# Patient Record
Sex: MALE | Race: WHITE | NOT HISPANIC OR LATINO | Employment: FULL TIME | URBAN - METROPOLITAN AREA
[De-identification: names, ages, dates, MRNs, and addresses within clinical notes are randomized per-mention and may not be internally consistent; named-entity substitution may affect disease eponyms.]

---

## 2018-07-04 ENCOUNTER — OFFICE VISIT (OUTPATIENT)
Dept: URGENT CARE | Facility: CLINIC | Age: 33
End: 2018-07-04
Payer: COMMERCIAL

## 2018-07-04 VITALS
SYSTOLIC BLOOD PRESSURE: 112 MMHG | WEIGHT: 165 LBS | BODY MASS INDEX: 23.62 KG/M2 | TEMPERATURE: 98 F | RESPIRATION RATE: 16 BRPM | DIASTOLIC BLOOD PRESSURE: 73 MMHG | HEIGHT: 70 IN | OXYGEN SATURATION: 98 % | HEART RATE: 87 BPM

## 2018-07-04 DIAGNOSIS — J06.9 URI WITH COUGH AND CONGESTION: Primary | ICD-10-CM

## 2018-07-04 PROCEDURE — 3008F BODY MASS INDEX DOCD: CPT | Mod: CPTII,S$GLB,, | Performed by: NURSE PRACTITIONER

## 2018-07-04 PROCEDURE — 99203 OFFICE O/P NEW LOW 30 MIN: CPT | Mod: 25,S$GLB,, | Performed by: NURSE PRACTITIONER

## 2018-07-04 PROCEDURE — 96372 THER/PROPH/DIAG INJ SC/IM: CPT | Mod: S$GLB,,, | Performed by: NURSE PRACTITIONER

## 2018-07-04 RX ORDER — AMOXICILLIN AND CLAVULANATE POTASSIUM 875; 125 MG/1; MG/1
1 TABLET, FILM COATED ORAL EVERY 12 HOURS
Qty: 20 TABLET | Refills: 0 | Status: SHIPPED | OUTPATIENT
Start: 2018-07-04 | End: 2018-07-14

## 2018-07-04 RX ORDER — BETAMETHASONE SODIUM PHOSPHATE AND BETAMETHASONE ACETATE 3; 3 MG/ML; MG/ML
9 INJECTION, SUSPENSION INTRA-ARTICULAR; INTRALESIONAL; INTRAMUSCULAR; SOFT TISSUE
Status: COMPLETED | OUTPATIENT
Start: 2018-07-04 | End: 2018-07-04

## 2018-07-04 RX ADMIN — BETAMETHASONE SODIUM PHOSPHATE AND BETAMETHASONE ACETATE 9 MG: 3; 3 INJECTION, SUSPENSION INTRA-ARTICULAR; INTRALESIONAL; INTRAMUSCULAR; SOFT TISSUE at 09:07

## 2018-07-04 NOTE — PATIENT INSTRUCTIONS
USE FLONASE NASAL SPRAY AND MUCINEX OTC     WAIT 2-3 DAYS BEFORE FILLING ANTIBIOTIC    You can try breathe right strips at night to help you breathe.  A cool mist humidifier in bedroom may help with cough and relieve stuffy nose.     Sore throat:  Lozenge, hard candy or honey.      Sinus rinses DO NOT USE TAP WATER, if you must, water must be a rolling boil for 1 minute, let it cool, then use.  May use distilled water, or over the counter nasal saline rinses.  Vics vapor rub in shower to help open nasal passages.  May use nasal gel to keep passages moisturized.  May use Nasal saline sprays during the day for added relief of congestion.   For those who go to the gym, please do not use the sauna or steam room now to clear sinuses.    During pollen season, change shirt if you are outside for a while when you go in.  Also wash your face.  Do not touch your face with your hands.  Wash your hands often in general while ill, avoid face contact with hands.     Over the counter you can use Tylenol (acetominophen) or Ibuprofen for your minor aches and pains as long as you have no contraindications.    Good nutrition. Lots of rest. Plenty of fluids      Viral Upper Respiratory Illness (Adult)  You have a viral upper respiratory illness (URI), which is another term for the common cold. This illness is contagious during the first few days. It is spread through the air by coughing and sneezing. It may also be spread by direct contact (touching the sick person and then touching your own eyes, nose, or mouth). Frequent handwashing will decrease risk of spread. Most viral illnesses go away within 7 to 10 days with rest and simple home remedies. Sometimes the illness may last for several weeks. Antibiotics will not kill a virus, and they are generally not prescribed for this condition.    Home care  · If symptoms are severe, rest at home for the first 2 to 3 days. When you resume activity, don't let yourself get too  tired.  · Avoid being exposed to cigarette smoke (yours or others).  · You may use acetaminophen or ibuprofen to control pain and fever, unless another medicine was prescribed. (Note: If you have chronic liver or kidney disease, have ever had a stomach ulcer or gastrointestinal bleeding, or are taking blood-thinning medicines, talk with your healthcare provider before using these medicines.) Aspirin should never be given to anyone under 18 years of age who is ill with a viral infection or fever. It may cause severe liver or brain damage.  · Your appetite may be poor, so a light diet is fine. Avoid dehydration by drinking 6 to 8 glasses of fluids per day (water, soft drinks, juices, tea, or soup). Extra fluids will help loosen secretions in the nose and lungs.  · Over-the-counter cold medicines will not shorten the length of time youre sick, but they may be helpful for the following symptoms: cough, sore throat, and nasal and sinus congestion. (Note: Do not use decongestants if you have high blood pressure.)  Follow-up care  Follow up with your healthcare provider, or as advised.  When to seek medical advice  Call your healthcare provider right away if any of these occur:  · Cough with lots of colored sputum (mucus)  · Severe headache; face, neck, or ear pain  · Difficulty swallowing due to throat pain  · Fever of 100.4°F (38°C)  Call 911, or get immediate medical care  Call emergency services right away if any of these occur:  · Chest pain, shortness of breath, wheezing, or difficulty breathing  · Coughing up blood  · Inability to swallow due to throat pain  Date Last Reviewed: 9/13/2015 © 2000-2017 [employer]. [address] [place] [address], [place] [address]. All rights reserved. This information is not intended as a substitute for professional medical care. Always follow your healthcare professional's instructions.

## 2018-07-04 NOTE — PROGRESS NOTES
"Subjective:       Patient ID: Garrick White is a 33 y.o. male.    Vitals:  height is 5' 10" (1.778 m) and weight is 74.8 kg (165 lb). His temperature is 97.9 °F (36.6 °C). His blood pressure is 112/73 and his pulse is 87. His respiration is 16 and oxygen saturation is 98%.     Chief Complaint: URI (pt has been sick for 2 days)    Pt is here for cough, congestion, and headache for the last 2 days that has not improved with tylenol and decongestant. Pt said that he felt like he had fever this morning.       URI    This is a new problem. The current episode started in the past 7 days. The problem has been unchanged. There has been no fever. Associated symptoms include congestion and coughing. Pertinent negatives include no abdominal pain, chest pain, ear pain, headaches, nausea, sore throat or wheezing. He has tried acetaminophen and decongestant for the symptoms. The treatment provided mild relief.     Review of Systems   Constitution: Negative for chills, fever and malaise/fatigue.   HENT: Positive for congestion. Negative for ear pain, hoarse voice and sore throat.    Eyes: Negative for discharge and redness.   Cardiovascular: Negative for chest pain, dyspnea on exertion and leg swelling.   Respiratory: Positive for cough and sputum production. Negative for shortness of breath and wheezing.    Musculoskeletal: Negative for myalgias.   Gastrointestinal: Negative for abdominal pain and nausea.   Neurological: Negative for headaches.       Objective:      Physical Exam   Constitutional: He is oriented to person, place, and time. Vital signs are normal. He appears well-developed and well-nourished. He is cooperative.  Non-toxic appearance. He does not appear ill. No distress.   HENT:   Head: Normocephalic and atraumatic.   Right Ear: Hearing, tympanic membrane, external ear and ear canal normal.   Left Ear: Hearing, tympanic membrane, external ear and ear canal normal.   Nose: Mucosal edema and rhinorrhea present. No " nasal deformity. No epistaxis. Right sinus exhibits no maxillary sinus tenderness and no frontal sinus tenderness. Left sinus exhibits no maxillary sinus tenderness and no frontal sinus tenderness.   Mouth/Throat: Uvula is midline, oropharynx is clear and moist and mucous membranes are normal. No trismus in the jaw. Normal dentition. No uvula swelling. No oropharyngeal exudate, posterior oropharyngeal edema or posterior oropharyngeal erythema.   Eyes: Conjunctivae and lids are normal. No scleral icterus.   Sclera clear bilat   Neck: Trachea normal, full passive range of motion without pain and phonation normal. Neck supple.   Cardiovascular: Normal rate, regular rhythm, normal heart sounds, intact distal pulses and normal pulses.    Pulmonary/Chest: Effort normal and breath sounds normal. No respiratory distress. He has no decreased breath sounds. He has no wheezes.   Abdominal: Soft. Normal appearance and bowel sounds are normal. He exhibits no distension. There is no tenderness.   Musculoskeletal: Normal range of motion. He exhibits no edema or deformity.   Lymphadenopathy:     He has no cervical adenopathy.   Neurological: He is alert and oriented to person, place, and time. He exhibits normal muscle tone. Coordination normal.   Skin: Skin is warm, dry and intact. He is not diaphoretic. No pallor.   Psychiatric: He has a normal mood and affect. His speech is normal and behavior is normal. Judgment and thought content normal. Cognition and memory are normal.   Nursing note and vitals reviewed.      Assessment:       1. URI with cough and congestion        Plan:         URI with cough and congestion  -     betamethasone acetate-betamethasone sodium phosphate injection 9 mg; Inject 1.5 mLs (9 mg total) into the muscle one time.  -     amoxicillin-clavulanate 875-125mg (AUGMENTIN) 875-125 mg per tablet; Take 1 tablet by mouth every 12 (twelve) hours. for 10 days  Dispense: 20 tablet; Refill: 0    explained to pt that  he did not need abx but he asked for one after viral vs bacterial education. Pt told to wait and fill abx  Patient Instructions       USE FLONASE NASAL SPRAY AND MUCINEX OTC     WAIT 2-3 DAYS BEFORE FILLING ANTIBIOTIC    You can try breathe right strips at night to help you breathe.  A cool mist humidifier in bedroom may help with cough and relieve stuffy nose.     Sore throat:  Lozenge, hard candy or honey.      Sinus rinses DO NOT USE TAP WATER, if you must, water must be a rolling boil for 1 minute, let it cool, then use.  May use distilled water, or over the counter nasal saline rinses.  Vics vapor rub in shower to help open nasal passages.  May use nasal gel to keep passages moisturized.  May use Nasal saline sprays during the day for added relief of congestion.   For those who go to the gym, please do not use the sauna or steam room now to clear sinuses.    During pollen season, change shirt if you are outside for a while when you go in.  Also wash your face.  Do not touch your face with your hands.  Wash your hands often in general while ill, avoid face contact with hands.     Over the counter you can use Tylenol (acetominophen) or Ibuprofen for your minor aches and pains as long as you have no contraindications.    Good nutrition. Lots of rest. Plenty of fluids      Viral Upper Respiratory Illness (Adult)  You have a viral upper respiratory illness (URI), which is another term for the common cold. This illness is contagious during the first few days. It is spread through the air by coughing and sneezing. It may also be spread by direct contact (touching the sick person and then touching your own eyes, nose, or mouth). Frequent handwashing will decrease risk of spread. Most viral illnesses go away within 7 to 10 days with rest and simple home remedies. Sometimes the illness may last for several weeks. Antibiotics will not kill a virus, and they are generally not prescribed for this condition.    Home  care  · If symptoms are severe, rest at home for the first 2 to 3 days. When you resume activity, don't let yourself get too tired.  · Avoid being exposed to cigarette smoke (yours or others).  · You may use acetaminophen or ibuprofen to control pain and fever, unless another medicine was prescribed. (Note: If you have chronic liver or kidney disease, have ever had a stomach ulcer or gastrointestinal bleeding, or are taking blood-thinning medicines, talk with your healthcare provider before using these medicines.) Aspirin should never be given to anyone under 18 years of age who is ill with a viral infection or fever. It may cause severe liver or brain damage.  · Your appetite may be poor, so a light diet is fine. Avoid dehydration by drinking 6 to 8 glasses of fluids per day (water, soft drinks, juices, tea, or soup). Extra fluids will help loosen secretions in the nose and lungs.  · Over-the-counter cold medicines will not shorten the length of time youre sick, but they may be helpful for the following symptoms: cough, sore throat, and nasal and sinus congestion. (Note: Do not use decongestants if you have high blood pressure.)  Follow-up care  Follow up with your healthcare provider, or as advised.  When to seek medical advice  Call your healthcare provider right away if any of these occur:  · Cough with lots of colored sputum (mucus)  · Severe headache; face, neck, or ear pain  · Difficulty swallowing due to throat pain  · Fever of 100.4°F (38°C)  Call 911, or get immediate medical care  Call emergency services right away if any of these occur:  · Chest pain, shortness of breath, wheezing, or difficulty breathing  · Coughing up blood  · Inability to swallow due to throat pain  Date Last Reviewed: 9/13/2015  © 7271-5311 [employer]. [address] [place] [address], [place] [address]. All rights reserved. This information is not intended as a substitute for professional medical care. Always follow your  healthcare professional's instructions.

## 2020-08-11 ENCOUNTER — OFFICE VISIT (OUTPATIENT)
Dept: FAMILY MEDICINE | Facility: CLINIC | Age: 35
End: 2020-08-11
Payer: MEDICAID

## 2020-08-11 VITALS
HEIGHT: 69 IN | HEART RATE: 72 BPM | BODY MASS INDEX: 22.66 KG/M2 | WEIGHT: 153 LBS | TEMPERATURE: 99 F | DIASTOLIC BLOOD PRESSURE: 70 MMHG | SYSTOLIC BLOOD PRESSURE: 118 MMHG

## 2020-08-11 DIAGNOSIS — F11.11 HISTORY OF HEROIN ABUSE: ICD-10-CM

## 2020-08-11 DIAGNOSIS — Z00.00 PHYSICAL EXAM: Primary | ICD-10-CM

## 2020-08-11 DIAGNOSIS — F41.9 ANXIETY: ICD-10-CM

## 2020-08-11 DIAGNOSIS — R79.89 LOW TESTOSTERONE IN MALE: ICD-10-CM

## 2020-08-11 DIAGNOSIS — Z79.899 HIGH RISK MEDICATION USE: ICD-10-CM

## 2020-08-11 PROCEDURE — 99204 OFFICE O/P NEW MOD 45 MIN: CPT | Mod: S$GLB,,, | Performed by: NURSE PRACTITIONER

## 2020-08-11 PROCEDURE — 99204 PR OFFICE/OUTPT VISIT, NEW, LEVL IV, 45-59 MIN: ICD-10-PCS | Mod: S$GLB,,, | Performed by: NURSE PRACTITIONER

## 2020-08-11 RX ORDER — FLUOXETINE HYDROCHLORIDE 20 MG/1
20 CAPSULE ORAL DAILY
Qty: 30 CAPSULE | Refills: 11 | Status: SHIPPED | OUTPATIENT
Start: 2020-08-11 | End: 2020-09-08

## 2020-08-11 RX ORDER — TRAZODONE HYDROCHLORIDE 50 MG/1
50 TABLET ORAL NIGHTLY
Qty: 30 TABLET | Refills: 11 | Status: SHIPPED | OUTPATIENT
Start: 2020-08-11 | End: 2020-11-17 | Stop reason: SDUPTHER

## 2020-08-11 NOTE — PROGRESS NOTES
SUBJECTIVE:    Patient ID: Garrick White is a 35 y.o. male.    Chief Complaint: Establish Care (takes no meds// SW)    35 year old male presents for check up. History of heroin abuse. Has been in rehab >1 year. Recently moved back to [facility]. Doing better. Living with parents. Working. Currently going through a divorce. Feels stressed and overwhelmed. Treated for anxiety and depression in the past. Not sleeping well. History of testosterone deficiency. Not taking any replacement. No homicidal/no suicidal ideations. Good support system. History of melanoma      No visits with results within 6 Month(s) from this visit.   Latest known visit with results is:   Historical on 05/14/2007   Component Date Value Ref Range Status    Bacitracin 05/14/2007 Negative for Group A Strep   Final       Past Medical History:   Diagnosis Date    Anxiety     Depression     Melanoma      Past Surgical History:   Procedure Laterality Date    EXCISION OF MELANOMA      back of neck     Family History   Problem Relation Age of Onset    Hyperlipidemia Mother     ADD / ADHD Brother        Marital Status:   Alcohol History:  reports no history of alcohol use.  Tobacco History:  reports that he has never smoked. He has never used smokeless tobacco.  Drug History:  has no history on file for drug.    Review of patient's allergies indicates:  No Known Allergies    Current Outpatient Medications:     FLUoxetine 20 MG capsule, Take 1 capsule (20 mg total) by mouth once daily., Disp: 30 capsule, Rfl: 11    traZODone (DESYREL) 50 MG tablet, Take 1 tablet (50 mg total) by mouth every evening., Disp: 30 tablet, Rfl: 11    Review of Systems   Constitutional: Negative for fatigue, fever and unexpected weight change.   HENT: Negative for ear pain, sinus pressure and sore throat.    Eyes: Negative for pain.   Respiratory: Negative for cough and shortness of breath.    Cardiovascular: Negative for chest pain and leg swelling.  "  Gastrointestinal: Negative for abdominal pain, constipation, nausea and vomiting.   Genitourinary: Negative for dysuria, frequency and urgency.   Musculoskeletal: Negative for arthralgias.   Skin: Negative for rash.   Neurological: Negative for dizziness, weakness and headaches.   Psychiatric/Behavioral: Positive for agitation. Negative for sleep disturbance. The patient is nervous/anxious.           Objective:      Vitals:    08/11/20 1202   BP: 118/70   Pulse: 72   Temp: 99 °F (37.2 °C)   Weight: 69.4 kg (153 lb)   Height: 5' 8.5" (1.74 m)     Body mass index is 22.93 kg/m².  Physical Exam  Constitutional:       Appearance: He is well-developed.   HENT:      Right Ear: External ear normal.      Left Ear: External ear normal.   Neck:      Musculoskeletal: Neck supple.      Trachea: No tracheal deviation.   Cardiovascular:      Rate and Rhythm: Normal rate and regular rhythm.      Heart sounds: No murmur. No friction rub. No gallop.    Pulmonary:      Breath sounds: Normal breath sounds. No stridor. No wheezing or rales.   Abdominal:      Palpations: Abdomen is soft. There is no mass.      Tenderness: There is no abdominal tenderness.   Musculoskeletal:         General: No tenderness or deformity.   Lymphadenopathy:      Cervical: No cervical adenopathy.   Skin:     General: Skin is warm and dry.   Neurological:      Mental Status: He is alert and oriented to person, place, and time.      Coordination: Coordination normal.   Psychiatric:         Thought Content: Thought content normal.           Assessment:       1. Physical exam    2. Anxiety    3. High risk medication use    4. Low testosterone in male    5. History of heroin abuse         Plan:       Physical exam  -     CBC auto differential; Future; Expected date: 08/11/2020  -     Comprehensive metabolic panel; Future; Expected date: 08/11/2020  -     TSH w/reflex to FT4; Future; Expected date: 08/11/2020  -     Urinalysis, Reflex to Urine Culture Urine, " Clean Catch; Future; Expected date: 08/11/2020  -     Lipid Panel; Future; Expected date: 08/11/2020    Anxiety  Comments:  name of therapist given to patient. will call if trouble scheduling  Orders:  -     CBC auto differential; Future; Expected date: 08/11/2020  -     Comprehensive metabolic panel; Future; Expected date: 08/11/2020  -     TSH w/reflex to FT4; Future; Expected date: 08/11/2020  -     Urinalysis, Reflex to Urine Culture Urine, Clean Catch; Future; Expected date: 08/11/2020  -     Lipid Panel; Future; Expected date: 08/11/2020  -     FLUoxetine 20 MG capsule; Take 1 capsule (20 mg total) by mouth once daily.  Dispense: 30 capsule; Refill: 11  -     traZODone (DESYREL) 50 MG tablet; Take 1 tablet (50 mg total) by mouth every evening.  Dispense: 30 tablet; Refill: 11    High risk medication use    Low testosterone in male  -     Testosterone, Free; Future; Expected date: 08/11/2020  -     Testosterone, Total, Males; Future; Expected date: 08/11/2020    History of heroin abuse  -     CBC auto differential; Future; Expected date: 08/11/2020  -     Comprehensive metabolic panel; Future; Expected date: 08/11/2020  -     TSH w/reflex to FT4; Future; Expected date: 08/11/2020  -     Urinalysis, Reflex to Urine Culture Urine, Clean Catch; Future; Expected date: 08/11/2020  -     Lipid Panel; Future; Expected date: 08/11/2020  -     Testosterone, Free; Future; Expected date: 08/11/2020      Follow up in about 4 weeks (around 9/8/2020) for medication management.

## 2020-08-27 ENCOUNTER — PATIENT MESSAGE (OUTPATIENT)
Dept: FAMILY MEDICINE | Facility: CLINIC | Age: 35
End: 2020-08-27

## 2020-09-08 ENCOUNTER — TELEPHONE (OUTPATIENT)
Dept: FAMILY MEDICINE | Facility: CLINIC | Age: 35
End: 2020-09-08

## 2020-09-08 RX ORDER — FLUOXETINE HYDROCHLORIDE 40 MG/1
40 CAPSULE ORAL DAILY
Qty: 30 CAPSULE | Refills: 0 | Status: SHIPPED | OUTPATIENT
Start: 2020-09-08 | End: 2020-11-17 | Stop reason: SDUPTHER

## 2020-09-15 ENCOUNTER — PATIENT MESSAGE (OUTPATIENT)
Dept: FAMILY MEDICINE | Facility: CLINIC | Age: 35
End: 2020-09-15

## 2020-09-16 ENCOUNTER — OFFICE VISIT (OUTPATIENT)
Dept: FAMILY MEDICINE | Facility: CLINIC | Age: 35
End: 2020-09-16
Payer: MEDICAID

## 2020-09-16 ENCOUNTER — TELEPHONE (OUTPATIENT)
Dept: FAMILY MEDICINE | Facility: CLINIC | Age: 35
End: 2020-09-16

## 2020-09-16 VITALS
SYSTOLIC BLOOD PRESSURE: 127 MMHG | HEIGHT: 69 IN | BODY MASS INDEX: 22.07 KG/M2 | TEMPERATURE: 98 F | DIASTOLIC BLOOD PRESSURE: 78 MMHG | HEART RATE: 80 BPM | WEIGHT: 149 LBS

## 2020-09-16 DIAGNOSIS — F41.9 ANXIETY: ICD-10-CM

## 2020-09-16 DIAGNOSIS — Z79.899 HIGH RISK MEDICATION USE: ICD-10-CM

## 2020-09-16 DIAGNOSIS — F11.11 HISTORY OF HEROIN ABUSE: ICD-10-CM

## 2020-09-16 DIAGNOSIS — Z00.00 PHYSICAL EXAM: Primary | ICD-10-CM

## 2020-09-16 PROCEDURE — 99395 PREV VISIT EST AGE 18-39: CPT | Mod: S$GLB,,, | Performed by: NURSE PRACTITIONER

## 2020-09-16 PROCEDURE — 99395 PR PREVENTIVE VISIT,EST,18-39: ICD-10-PCS | Mod: S$GLB,,, | Performed by: NURSE PRACTITIONER

## 2020-09-16 NOTE — PROGRESS NOTES
SUBJECTIVE:    Patient ID: Garrick White is a 35 y.o. male.    Chief Complaint: Follow-up    35 year old male presents for follow up. Patient has a history of drug addiction. He returned from year long rehab program about 9 months ago. Recently had a relapse. Reports that is struggling with anxiety. Going through divorce. Plans to return to rehab. Needs labs prior to admission. Mother is present during exam.       Office Visit on 09/16/2020   Component Date Value Ref Range Status    QuantiFERON-TB Gold Plus 09/16/2020 NEGATIVE  NEGATIVE Final    NIL 09/16/2020 0.17  IU/mL Final    Mitogen - Nil 09/16/2020 9.04  IU/mL Final    TB1 - Nil 09/16/2020 0.11  IU/mL Final    TB2 - Nil 09/16/2020 0.12  IU/mL Final    HIV Ag/Ab 4th Gen 09/16/2020 NON-REACTIVE  NON-REACTIVE Final    Hep A IgM 09/16/2020 NON-REACTIVE  NON-REACTIVE Final    Comment 09/16/2020 For additional information, please refer to http://education.[employer]/faq/KPN175 (This link is being provided for informational/ educational purposes only.)   Final    Hepatitis B Surface Ag 09/16/2020 NON-REACTIVE  NON-REACTIVE Final    Hep B C IgM 09/16/2020 NON-REACTIVE  NON-REACTIVE Final    Hepatitis C Ab 09/16/2020 NON-REACTIVE  NON-REACTIVE Final    Signal/Cutoff 09/16/2020 0.01  <1.00 Final   Office Visit on 08/11/2020   Component Date Value Ref Range Status    WBC 09/16/2020 7.0  3.8 - 10.8 Thousand/uL Final    RBC 09/16/2020 5.33  4.20 - 5.80 Million/uL Final    Hemoglobin 09/16/2020 16.4  13.2 - 17.1 g/dL Final    Hematocrit 09/16/2020 47.1  38.5 - 50.0 % Final    Mean Corpuscular Volume 09/16/2020 88.4  80.0 - 100.0 fL Final    Mean Corpuscular Hemoglobin 09/16/2020 30.8  27.0 - 33.0 pg Final    Mean Corpuscular Hemoglobin Conc 09/16/2020 34.8  32.0 - 36.0 g/dL Final    RDW 09/16/2020 13.4  11.0 - 15.0 % Final    Platelets 09/16/2020 306  140 - 400 Thousand/uL Final    MPV 09/16/2020 10.3  7.5 - 12.5 fL Final    Neutrophils  Absolute 09/16/2020 5,404  1,500 - 7,800 cells/uL Final    Lymph # 09/16/2020 1,183  850 - 3,900 cells/uL Final    Mono # 09/16/2020 378  200 - 950 cells/uL Final    Eos # 09/16/2020 7* 15 - 500 cells/uL Final    Baso # 09/16/2020 28  0 - 200 cells/uL Final    Neutrophils Relative 09/16/2020 77.2  % Final    Lymph% 09/16/2020 16.9  % Final    Mono% 09/16/2020 5.4  % Final    Eosinophil% 09/16/2020 0.1  % Final    Basophil% 09/16/2020 0.4  % Final    Glucose 09/16/2020 100  65 - 139 mg/dL Final    BUN, Bld 09/16/2020 14  7 - 25 mg/dL Final    Creatinine 09/16/2020 1.03  0.60 - 1.35 mg/dL Final    eGFR if non African American 09/16/2020 94  > OR = 60 mL/min/1.73m2 Final    eGFR if African American 09/16/2020 109  > OR = 60 mL/min/1.73m2 Final    BUN/Creatinine Ratio 09/16/2020 NOT APPLICABLE  6 - 22 (calc) Final    Sodium 09/16/2020 137  135 - 146 mmol/L Final    Potassium 09/16/2020 4.6  3.5 - 5.3 mmol/L Final    Chloride 09/16/2020 98  98 - 110 mmol/L Final    CO2 09/16/2020 29  20 - 32 mmol/L Final    Calcium 09/16/2020 10.4* 8.6 - 10.3 mg/dL Final    Total Protein 09/16/2020 7.7  6.1 - 8.1 g/dL Final    Albumin 09/16/2020 4.8  3.6 - 5.1 g/dL Final    Globulin, Total 09/16/2020 2.9  1.9 - 3.7 g/dL (calc) Final    Albumin/Globulin Ratio 09/16/2020 1.7  1.0 - 2.5 (calc) Final    Total Bilirubin 09/16/2020 1.4* 0.2 - 1.2 mg/dL Final    Alkaline Phosphatase 09/16/2020 53  36 - 130 U/L Final    AST 09/16/2020 24  10 - 40 U/L Final    ALT 09/16/2020 17  9 - 46 U/L Final    TSH w/reflex to FT4 09/16/2020 0.58  0.40 - 4.50 mIU/L Final    Color, UA 09/16/2020 YELLOW  YELLOW Final    Appearance, UA 09/16/2020 CLEAR  CLEAR Final    Specific [place], UA 09/16/2020 1.020  1.001 - 1.035 Final    pH, UA 09/16/2020 7.5  5.0 - 8.0 Final    Glucose, UA 09/16/2020 NEGATIVE  NEGATIVE Final    Bilirubin, UA 09/16/2020 NEGATIVE  NEGATIVE Final    Ketones, UA 09/16/2020 NEGATIVE  NEGATIVE Final     Occult Blood UA 09/16/2020 NEGATIVE  NEGATIVE Final    Protein, UA 09/16/2020 NEGATIVE  NEGATIVE Final    Nitrite, UA 09/16/2020 NEGATIVE  NEGATIVE Final    Leukocytes, UA 09/16/2020 NEGATIVE  NEGATIVE Final    WBC Casts, UA 09/16/2020 NONE SEEN  < OR = 5 /HPF Final    RBC Casts, UA 09/16/2020 NONE SEEN  < OR = 2 /HPF Final    Squam Epithel, UA 09/16/2020 NONE SEEN  < OR = 5 /HPF Final    Bacteria, UA 09/16/2020 NONE SEEN  NONE SEEN /HPF Final    Hyaline Casts, UA 09/16/2020 NONE SEEN  NONE SEEN /LPF Final    Reflexive Urine Culture 09/16/2020 NO CULTURE INDICATED   Final    Cholesterol 09/16/2020 160  <200 mg/dL Final    HDL 09/16/2020 43  > OR = 40 mg/dL Final    Triglycerides 09/16/2020 90  <150 mg/dL Final    LDL Cholesterol 09/16/2020 99  mg/dL (calc) Final    Hdl/Cholesterol Ratio 09/16/2020 3.7  <5.0 (calc) Final    Non HDL Chol. (LDL+VLDL) 09/16/2020 117  <130 mg/dL (calc) Final    TESTOSTERONE, TOTAL, MALE 09/16/2020 293  250 - 827 ng/dL Final       Past Medical History:   Diagnosis Date    Anxiety     Depression     Melanoma      Social History     Socioeconomic History    Marital status:      Spouse name: Not on file    Number of children: Not on file    Years of education: Not on file    Highest education level: Not on file   Occupational History    Not on file   Social Needs    Financial resource strain: Not on file    Food insecurity     Worry: Not on file     Inability: Not on file    Transportation needs     Medical: Not on file     Non-medical: Not on file   Tobacco Use    Smoking status: Never Smoker    Smokeless tobacco: Never Used   Substance and Sexual Activity    Alcohol use: No    Drug use: Not on file    Sexual activity: Not on file   Lifestyle    Physical activity     Days per week: Not on file     Minutes per session: Not on file    Stress: Not on file   Relationships    Social connections     Talks on phone: Not on file     Gets together: Not on  "file     Attends Hindu service: Not on file     Active member of club or organization: Not on file     Attends meetings of clubs or organizations: Not on file     Relationship status: Not on file   Other Topics Concern    Not on file   Social History Narrative    Not on file     Past Surgical History:   Procedure Laterality Date    EXCISION OF MELANOMA      back of neck     Family History   Problem Relation Age of Onset    Hyperlipidemia Mother     ADD / ADHD Brother        Review of patient's allergies indicates:  No Known Allergies    Current Outpatient Medications:     FLUoxetine 40 MG capsule, Take 1 capsule (40 mg total) by mouth once daily., Disp: 30 capsule, Rfl: 0    traZODone (DESYREL) 50 MG tablet, Take 1 tablet (50 mg total) by mouth every evening., Disp: 30 tablet, Rfl: 11    Review of Systems   Constitutional: Negative for fatigue, fever and unexpected weight change.   HENT: Negative for ear pain, sinus pressure and sore throat.    Eyes: Negative for pain.   Respiratory: Negative for cough and shortness of breath.    Cardiovascular: Negative for chest pain and leg swelling.   Gastrointestinal: Negative for abdominal pain, constipation, nausea and vomiting.   Genitourinary: Negative for dysuria, frequency and urgency.   Musculoskeletal: Negative for arthralgias.   Skin: Negative for rash.   Neurological: Negative for dizziness, weakness and headaches.   Psychiatric/Behavioral: Negative for sleep disturbance.          Objective:      Vitals:    09/16/20 1302   BP: 127/78   Pulse: 80   Temp: 98.3 °F (36.8 °C)   Weight: 67.6 kg (149 lb)   Height: 5' 8.5" (1.74 m)     Physical Exam  Constitutional:       Appearance: He is well-developed.   HENT:      Head: Normocephalic and atraumatic.      Right Ear: External ear normal.      Left Ear: External ear normal.   Eyes:      Pupils: Pupils are equal, round, and reactive to light.   Neck:      Musculoskeletal: Neck supple.      Trachea: No tracheal " deviation.   Cardiovascular:      Rate and Rhythm: Normal rate and regular rhythm.      Heart sounds: No murmur. No friction rub. No gallop.    Pulmonary:      Breath sounds: Normal breath sounds. No stridor. No wheezing or rales.   Abdominal:      Palpations: Abdomen is soft. There is no mass.      Tenderness: There is no abdominal tenderness.   Musculoskeletal:         General: No tenderness or deformity.   Lymphadenopathy:      Cervical: No cervical adenopathy.   Skin:     General: Skin is warm and dry.   Neurological:      Mental Status: He is alert and oriented to person, place, and time.      Coordination: Coordination normal.   Psychiatric:         Mood and Affect: Mood is anxious.         Thought Content: Thought content normal.           Assessment:       1. Physical exam    2. High risk medication use    3. History of heroin abuse    4. Anxiety         Plan:       Physical exam  Comments:  will have labs performed  Orders:  -     QuantiFERON-TB Gold Plus; Future; Expected date: 09/16/2020  -     HIV 1/2 Ag/Ab (4th Gen) w/Refl; Future; Expected date: 09/16/2020  -     Hepatitis Panel, Acute; Future; Expected date: 09/16/2020    High risk medication use  -     QuantiFERON-TB Gold Plus; Future; Expected date: 09/16/2020  -     HIV 1/2 Ag/Ab (4th Gen) w/Refl; Future; Expected date: 09/16/2020  -     Hepatitis Panel, Acute; Future; Expected date: 09/16/2020    History of heroin abuse    Anxiety      Follow up in about 3 months (around 12/16/2020) for medication management.        9/20/2020 Ilana Nichole

## 2020-09-17 ENCOUNTER — PATIENT MESSAGE (OUTPATIENT)
Dept: FAMILY MEDICINE | Facility: CLINIC | Age: 35
End: 2020-09-17

## 2020-09-18 LAB
COMMENT: NORMAL
GAMMA INTERFERON BACKGROUND BLD IA-ACNC: 0.17 IU/ML
HAV IGM SERPL QL IA: NORMAL
HBV CORE IGM SERPL QL IA: NORMAL
HBV SURFACE AG SERPL QL IA: NORMAL
HCV AB S/CO SERPL IA: 0.01
HCV AB SERPL QL IA: NORMAL
HIV 1+2 AB+HIV1 P24 AG SERPL QL IA: NORMAL
M TB IFN-G BLD-IMP: NEGATIVE
M TB IFN-G CD4+ BCKGRND COR BLD-ACNC: 0.11 IU/ML
MITOGEN IGNF BCKGRD COR BLD-ACNC: 9.04 IU/ML
TB2 - NIL: 0.12 IU/ML

## 2020-09-21 LAB
ALBUMIN SERPL-MCNC: 4.8 G/DL (ref 3.6–5.1)
ALBUMIN/GLOB SERPL: 1.7 (CALC) (ref 1–2.5)
ALP SERPL-CCNC: 53 U/L (ref 36–130)
ALT SERPL-CCNC: 17 U/L (ref 9–46)
APPEARANCE UR: CLEAR
AST SERPL-CCNC: 24 U/L (ref 10–40)
BACTERIA #/AREA URNS HPF: NORMAL /HPF
BACTERIA UR CULT: NORMAL
BASOPHILS # BLD AUTO: 28 CELLS/UL (ref 0–200)
BASOPHILS NFR BLD AUTO: 0.4 %
BILIRUB SERPL-MCNC: 1.4 MG/DL (ref 0.2–1.2)
BILIRUB UR QL STRIP: NEGATIVE
BUN SERPL-MCNC: 14 MG/DL (ref 7–25)
BUN/CREAT SERPL: ABNORMAL (CALC) (ref 6–22)
CALCIUM SERPL-MCNC: 10.4 MG/DL (ref 8.6–10.3)
CHLORIDE SERPL-SCNC: 98 MMOL/L (ref 98–110)
CHOLEST SERPL-MCNC: 160 MG/DL
CHOLEST/HDLC SERPL: 3.7 (CALC)
CO2 SERPL-SCNC: 29 MMOL/L (ref 20–32)
COLOR UR: YELLOW
CREAT SERPL-MCNC: 1.03 MG/DL (ref 0.6–1.35)
EOSINOPHIL # BLD AUTO: 7 CELLS/UL (ref 15–500)
EOSINOPHIL NFR BLD AUTO: 0.1 %
ERYTHROCYTE [DISTWIDTH] IN BLOOD BY AUTOMATED COUNT: 13.4 % (ref 11–15)
GFRSERPLBLD MDRD-ARVRAT: 94 ML/MIN/1.73M2
GLOBULIN SER CALC-MCNC: 2.9 G/DL (CALC) (ref 1.9–3.7)
GLUCOSE SERPL-MCNC: 100 MG/DL (ref 65–139)
GLUCOSE UR QL STRIP: NEGATIVE
HCT VFR BLD AUTO: 47.1 % (ref 38.5–50)
HDLC SERPL-MCNC: 43 MG/DL
HGB BLD-MCNC: 16.4 G/DL (ref 13.2–17.1)
HGB UR QL STRIP: NEGATIVE
HYALINE CASTS #/AREA URNS LPF: NORMAL /LPF
KETONES UR QL STRIP: NEGATIVE
LDLC SERPL CALC-MCNC: 99 MG/DL (CALC)
LEUKOCYTE ESTERASE UR QL STRIP: NEGATIVE
LYMPHOCYTES # BLD AUTO: 1183 CELLS/UL (ref 850–3900)
LYMPHOCYTES NFR BLD AUTO: 16.9 %
MCH RBC QN AUTO: 30.8 PG (ref 27–33)
MCHC RBC AUTO-ENTMCNC: 34.8 G/DL (ref 32–36)
MCV RBC AUTO: 88.4 FL (ref 80–100)
MONOCYTES # BLD AUTO: 378 CELLS/UL (ref 200–950)
MONOCYTES NFR BLD AUTO: 5.4 %
NEUTROPHILS # BLD AUTO: 5404 CELLS/UL (ref 1500–7800)
NEUTROPHILS NFR BLD AUTO: 77.2 %
NITRITE UR QL STRIP: NEGATIVE
NONHDLC SERPL-MCNC: 117 MG/DL (CALC)
PH UR STRIP: 7.5 [PH] (ref 5–8)
PLATELET # BLD AUTO: 306 THOUSAND/UL (ref 140–400)
PMV BLD REES-ECKER: 10.3 FL (ref 7.5–12.5)
POTASSIUM SERPL-SCNC: 4.6 MMOL/L (ref 3.5–5.3)
PROT SERPL-MCNC: 7.7 G/DL (ref 6.1–8.1)
PROT UR QL STRIP: NEGATIVE
RBC # BLD AUTO: 5.33 MILLION/UL (ref 4.2–5.8)
RBC #/AREA URNS HPF: NORMAL /HPF
SODIUM SERPL-SCNC: 137 MMOL/L (ref 135–146)
SP GR UR STRIP: 1.02 (ref 1–1.03)
SQUAMOUS #/AREA URNS HPF: NORMAL /HPF
TESTOST FREE SERPL-MCNC: 46.2 PG/ML (ref 46–224)
TESTOST SERPL-MCNC: 293 NG/DL (ref 250–827)
TRIGL SERPL-MCNC: 90 MG/DL
TSH SERPL-ACNC: 0.58 MIU/L (ref 0.4–4.5)
WBC # BLD AUTO: 7 THOUSAND/UL (ref 3.8–10.8)
WBC #/AREA URNS HPF: NORMAL /HPF

## 2020-11-08 ENCOUNTER — HOSPITAL ENCOUNTER (INPATIENT)
Facility: OTHER | Age: 35
LOS: 3 days | Discharge: HOME | DRG: 557 | End: 2020-11-11
Attending: EMERGENCY MEDICINE | Admitting: EMERGENCY MEDICINE
Payer: MEDICAID

## 2020-11-08 DIAGNOSIS — R79.89 ELEVATED LFTS: ICD-10-CM

## 2020-11-08 DIAGNOSIS — I63.9 STROKE: ICD-10-CM

## 2020-11-08 DIAGNOSIS — R29.898 RIGHT ARM WEAKNESS: ICD-10-CM

## 2020-11-08 DIAGNOSIS — R00.0 SINUS TACHYCARDIA: ICD-10-CM

## 2020-11-08 DIAGNOSIS — R53.1 WEAKNESS: ICD-10-CM

## 2020-11-08 DIAGNOSIS — E87.5 HYPERKALEMIA: ICD-10-CM

## 2020-11-08 DIAGNOSIS — N17.9 AKI (ACUTE KIDNEY INJURY): ICD-10-CM

## 2020-11-08 DIAGNOSIS — M62.82 NON-TRAUMATIC RHABDOMYOLYSIS: Primary | ICD-10-CM

## 2020-11-08 DIAGNOSIS — E87.20 LACTIC ACIDOSIS: ICD-10-CM

## 2020-11-08 PROBLEM — G93.41 ENCEPHALOPATHY, METABOLIC: Status: ACTIVE | Noted: 2020-11-08

## 2020-11-08 PROBLEM — F19.20 POLYSUBSTANCE (EXCLUDING OPIOIDS) DEPENDENCE, DAILY USE: Status: ACTIVE | Noted: 2020-11-08

## 2020-11-08 PROBLEM — J69.0 ASPIRATION PNEUMONIA: Status: ACTIVE | Noted: 2020-11-08

## 2020-11-08 LAB
ALBUMIN SERPL BCP-MCNC: 4.6 G/DL (ref 3.5–5.2)
ALP SERPL-CCNC: 76 U/L (ref 55–135)
ALT SERPL W/O P-5'-P-CCNC: 94 U/L (ref 10–44)
AMPHET+METHAMPHET UR QL: NEGATIVE
ANION GAP SERPL CALC-SCNC: 13 MMOL/L (ref 8–16)
ANION GAP SERPL CALC-SCNC: 16 MMOL/L (ref 8–16)
APAP SERPL-MCNC: <3 UG/ML (ref 10–20)
AST SERPL-CCNC: 188 U/L (ref 10–40)
BACTERIA #/AREA URNS HPF: ABNORMAL /HPF
BARBITURATES UR QL SCN>200 NG/ML: NEGATIVE
BASOPHILS # BLD AUTO: 0.03 K/UL (ref 0–0.2)
BASOPHILS NFR BLD: 0.2 % (ref 0–1.9)
BENZODIAZ UR QL SCN>200 NG/ML: NEGATIVE
BILIRUB SERPL-MCNC: 0.6 MG/DL (ref 0.1–1)
BILIRUB UR QL STRIP: NEGATIVE
BUN SERPL-MCNC: 27 MG/DL (ref 6–20)
BUN SERPL-MCNC: 33 MG/DL (ref 6–20)
BZE UR QL SCN: NORMAL
CALCIUM SERPL-MCNC: 8.4 MG/DL (ref 8.7–10.5)
CALCIUM SERPL-MCNC: 8.9 MG/DL (ref 8.7–10.5)
CANNABINOIDS UR QL SCN: NEGATIVE
CHLORIDE SERPL-SCNC: 101 MMOL/L (ref 95–110)
CHLORIDE SERPL-SCNC: 98 MMOL/L (ref 95–110)
CK SERPL-CCNC: ABNORMAL U/L (ref 20–200)
CLARITY UR: CLEAR
CO2 SERPL-SCNC: 18 MMOL/L (ref 23–29)
CO2 SERPL-SCNC: 20 MMOL/L (ref 23–29)
COLOR UR: YELLOW
CREAT SERPL-MCNC: 1.7 MG/DL (ref 0.5–1.4)
CREAT SERPL-MCNC: 2.2 MG/DL (ref 0.5–1.4)
CREAT UR-MCNC: 141.1 MG/DL (ref 23–375)
CTP QC/QA: YES
DIFFERENTIAL METHOD: ABNORMAL
EOSINOPHIL # BLD AUTO: 0 K/UL (ref 0–0.5)
EOSINOPHIL NFR BLD: 0 % (ref 0–8)
ERYTHROCYTE [DISTWIDTH] IN BLOOD BY AUTOMATED COUNT: 13.5 % (ref 11.5–14.5)
EST. GFR  (AFRICAN AMERICAN): 44 ML/MIN/1.73 M^2
EST. GFR  (AFRICAN AMERICAN): 60 ML/MIN/1.73 M^2
EST. GFR  (NON AFRICAN AMERICAN): 38 ML/MIN/1.73 M^2
EST. GFR  (NON AFRICAN AMERICAN): 51 ML/MIN/1.73 M^2
ETHANOL SERPL-MCNC: <10 MG/DL
GLUCOSE SERPL-MCNC: 112 MG/DL (ref 70–110)
GLUCOSE SERPL-MCNC: 121 MG/DL (ref 70–110)
GLUCOSE UR QL STRIP: NEGATIVE
HCT VFR BLD AUTO: 50.3 % (ref 40–54)
HGB BLD-MCNC: 16.6 G/DL (ref 14–18)
HGB UR QL STRIP: ABNORMAL
HYALINE CASTS #/AREA URNS LPF: 4 /LPF
IMM GRANULOCYTES # BLD AUTO: 0.07 K/UL (ref 0–0.04)
IMM GRANULOCYTES NFR BLD AUTO: 0.4 % (ref 0–0.5)
KETONES UR QL STRIP: ABNORMAL
LACTATE SERPL-SCNC: 2.8 MMOL/L (ref 0.5–2.2)
LACTATE SERPL-SCNC: 4 MMOL/L (ref 0.5–2.2)
LEUKOCYTE ESTERASE UR QL STRIP: NEGATIVE
LYMPHOCYTES # BLD AUTO: 0.6 K/UL (ref 1–4.8)
LYMPHOCYTES NFR BLD: 3.3 % (ref 18–48)
MAGNESIUM SERPL-MCNC: 1.8 MG/DL (ref 1.6–2.6)
MCH RBC QN AUTO: 30 PG (ref 27–31)
MCHC RBC AUTO-ENTMCNC: 33 G/DL (ref 32–36)
MCV RBC AUTO: 91 FL (ref 82–98)
METHADONE UR QL SCN>300 NG/ML: NEGATIVE
MICROSCOPIC COMMENT: ABNORMAL
MONOCYTES # BLD AUTO: 1.7 K/UL (ref 0.3–1)
MONOCYTES NFR BLD: 8.9 % (ref 4–15)
NEUTROPHILS # BLD AUTO: 17.1 K/UL (ref 1.8–7.7)
NEUTROPHILS NFR BLD: 87.2 % (ref 38–73)
NITRITE UR QL STRIP: NEGATIVE
NRBC BLD-RTO: 0 /100 WBC
OPIATES UR QL SCN: NORMAL
PCP UR QL SCN>25 NG/ML: NEGATIVE
PH UR STRIP: 6 [PH] (ref 5–8)
PLATELET # BLD AUTO: 247 K/UL (ref 150–350)
PLATELET BLD QL SMEAR: ABNORMAL
PMV BLD AUTO: 9.9 FL (ref 9.2–12.9)
POTASSIUM SERPL-SCNC: 5.9 MMOL/L (ref 3.5–5.1)
POTASSIUM SERPL-SCNC: 7 MMOL/L (ref 3.5–5.1)
PROT SERPL-MCNC: 8.3 G/DL (ref 6–8.4)
PROT UR QL STRIP: ABNORMAL
RBC # BLD AUTO: 5.53 M/UL (ref 4.6–6.2)
RBC #/AREA URNS HPF: 3 /HPF (ref 0–4)
SARS-COV-2 RDRP RESP QL NAA+PROBE: NEGATIVE
SODIUM SERPL-SCNC: 132 MMOL/L (ref 136–145)
SODIUM SERPL-SCNC: 134 MMOL/L (ref 136–145)
SP GR UR STRIP: 1.02 (ref 1–1.03)
SQUAMOUS #/AREA URNS HPF: 3 /HPF
TOXICOLOGY INFORMATION: NORMAL
URN SPEC COLLECT METH UR: ABNORMAL
UROBILINOGEN UR STRIP-ACNC: NEGATIVE EU/DL
WBC # BLD AUTO: 19.53 K/UL (ref 3.9–12.7)
WBC #/AREA URNS HPF: 2 /HPF (ref 0–5)

## 2020-11-08 PROCEDURE — 87070 CULTURE OTHR SPECIMN AEROBIC: CPT

## 2020-11-08 PROCEDURE — 99291 PR CRITICAL CARE, E/M 30-74 MINUTES: ICD-10-PCS | Mod: ,,, | Performed by: INTERNAL MEDICINE

## 2020-11-08 PROCEDURE — 80053 COMPREHEN METABOLIC PANEL: CPT

## 2020-11-08 PROCEDURE — 94761 N-INVAS EAR/PLS OXIMETRY MLT: CPT

## 2020-11-08 PROCEDURE — 63600175 PHARM REV CODE 636 W HCPCS: Performed by: INTERNAL MEDICINE

## 2020-11-08 PROCEDURE — 25000003 PHARM REV CODE 250: Performed by: INTERNAL MEDICINE

## 2020-11-08 PROCEDURE — 80307 DRUG TEST PRSMV CHEM ANLYZR: CPT

## 2020-11-08 PROCEDURE — 93005 ELECTROCARDIOGRAM TRACING: CPT

## 2020-11-08 PROCEDURE — 99203 OFFICE O/P NEW LOW 30 MIN: CPT | Mod: 95,,, | Performed by: PSYCHIATRY & NEUROLOGY

## 2020-11-08 PROCEDURE — 81000 URINALYSIS NONAUTO W/SCOPE: CPT | Mod: 59

## 2020-11-08 PROCEDURE — 83605 ASSAY OF LACTIC ACID: CPT

## 2020-11-08 PROCEDURE — U0002 COVID-19 LAB TEST NON-CDC: HCPCS | Performed by: EMERGENCY MEDICINE

## 2020-11-08 PROCEDURE — 99291 CRITICAL CARE FIRST HOUR: CPT | Mod: ,,, | Performed by: INTERNAL MEDICINE

## 2020-11-08 PROCEDURE — 99291 CRITICAL CARE FIRST HOUR: CPT | Mod: 25

## 2020-11-08 PROCEDURE — 36415 COLL VENOUS BLD VENIPUNCTURE: CPT

## 2020-11-08 PROCEDURE — 96361 HYDRATE IV INFUSION ADD-ON: CPT

## 2020-11-08 PROCEDURE — 25000003 PHARM REV CODE 250: Performed by: EMERGENCY MEDICINE

## 2020-11-08 PROCEDURE — 80329 ANALGESICS NON-OPIOID 1 OR 2: CPT

## 2020-11-08 PROCEDURE — 99900035 HC TECH TIME PER 15 MIN (STAT)

## 2020-11-08 PROCEDURE — 96360 HYDRATION IV INFUSION INIT: CPT

## 2020-11-08 PROCEDURE — 83605 ASSAY OF LACTIC ACID: CPT | Mod: 91

## 2020-11-08 PROCEDURE — 27000221 HC OXYGEN, UP TO 24 HOURS

## 2020-11-08 PROCEDURE — 83735 ASSAY OF MAGNESIUM: CPT

## 2020-11-08 PROCEDURE — 94640 AIRWAY INHALATION TREATMENT: CPT

## 2020-11-08 PROCEDURE — 25000242 PHARM REV CODE 250 ALT 637 W/ HCPCS: Performed by: INTERNAL MEDICINE

## 2020-11-08 PROCEDURE — 85025 COMPLETE CBC W/AUTO DIFF WBC: CPT

## 2020-11-08 PROCEDURE — 82550 ASSAY OF CK (CPK): CPT

## 2020-11-08 PROCEDURE — 20000000 HC ICU ROOM

## 2020-11-08 PROCEDURE — 93010 EKG 12-LEAD: ICD-10-PCS | Mod: ,,, | Performed by: INTERNAL MEDICINE

## 2020-11-08 PROCEDURE — 80320 DRUG SCREEN QUANTALCOHOLS: CPT

## 2020-11-08 PROCEDURE — 99203 PR OFFICE/OUTPT VISIT, NEW, LEVL III, 30-44 MIN: ICD-10-PCS | Mod: 95,,, | Performed by: PSYCHIATRY & NEUROLOGY

## 2020-11-08 PROCEDURE — 87205 SMEAR GRAM STAIN: CPT

## 2020-11-08 PROCEDURE — 80048 BASIC METABOLIC PNL TOTAL CA: CPT

## 2020-11-08 PROCEDURE — 93010 ELECTROCARDIOGRAM REPORT: CPT | Mod: ,,, | Performed by: INTERNAL MEDICINE

## 2020-11-08 PROCEDURE — 87040 BLOOD CULTURE FOR BACTERIA: CPT | Mod: 59

## 2020-11-08 RX ORDER — ASPIRIN 81 MG/1
81 TABLET ORAL DAILY
Status: DISCONTINUED | OUTPATIENT
Start: 2020-11-08 | End: 2020-11-11 | Stop reason: HOSPADM

## 2020-11-08 RX ORDER — ACETAMINOPHEN 325 MG/1
650 TABLET ORAL EVERY 6 HOURS PRN
Status: DISCONTINUED | OUTPATIENT
Start: 2020-11-08 | End: 2020-11-11 | Stop reason: HOSPADM

## 2020-11-08 RX ORDER — METHOCARBAMOL 500 MG/1
500 TABLET, FILM COATED ORAL 4 TIMES DAILY PRN
Status: DISCONTINUED | OUTPATIENT
Start: 2020-11-08 | End: 2020-11-11 | Stop reason: HOSPADM

## 2020-11-08 RX ORDER — ONDANSETRON 2 MG/ML
4 INJECTION INTRAMUSCULAR; INTRAVENOUS EVERY 8 HOURS PRN
Status: DISCONTINUED | OUTPATIENT
Start: 2020-11-08 | End: 2020-11-11 | Stop reason: HOSPADM

## 2020-11-08 RX ORDER — MUPIROCIN 20 MG/G
OINTMENT TOPICAL 2 TIMES DAILY
Status: DISCONTINUED | OUTPATIENT
Start: 2020-11-08 | End: 2020-11-11 | Stop reason: HOSPADM

## 2020-11-08 RX ORDER — IPRATROPIUM BROMIDE AND ALBUTEROL SULFATE 2.5; .5 MG/3ML; MG/3ML
3 SOLUTION RESPIRATORY (INHALATION) EVERY 6 HOURS
Status: DISCONTINUED | OUTPATIENT
Start: 2020-11-08 | End: 2020-11-09

## 2020-11-08 RX ORDER — SODIUM CHLORIDE 0.9 % (FLUSH) 0.9 %
10 SYRINGE (ML) INJECTION
Status: DISCONTINUED | OUTPATIENT
Start: 2020-11-08 | End: 2020-11-11 | Stop reason: HOSPADM

## 2020-11-08 RX ORDER — ONDANSETRON 2 MG/ML
4 INJECTION INTRAMUSCULAR; INTRAVENOUS EVERY 6 HOURS PRN
Status: DISCONTINUED | OUTPATIENT
Start: 2020-11-08 | End: 2020-11-11 | Stop reason: HOSPADM

## 2020-11-08 RX ORDER — LOPERAMIDE HYDROCHLORIDE 2 MG/1
2 CAPSULE ORAL 4 TIMES DAILY PRN
Status: DISCONTINUED | OUTPATIENT
Start: 2020-11-08 | End: 2020-11-11 | Stop reason: HOSPADM

## 2020-11-08 RX ORDER — SODIUM BICARBONATE IN D5W 150/1000ML
PLASTIC BAG, INJECTION (ML) INTRAVENOUS CONTINUOUS
Status: DISCONTINUED | OUTPATIENT
Start: 2020-11-08 | End: 2020-11-09

## 2020-11-08 RX ADMIN — SODIUM CHLORIDE 1000 ML: 0.9 INJECTION, SOLUTION INTRAVENOUS at 02:11

## 2020-11-08 RX ADMIN — Medication: at 03:11

## 2020-11-08 RX ADMIN — THIAMINE HYDROCHLORIDE: 100 INJECTION, SOLUTION INTRAMUSCULAR; INTRAVENOUS at 07:11

## 2020-11-08 RX ADMIN — IPRATROPIUM BROMIDE AND ALBUTEROL SULFATE 3 ML: .5; 3 SOLUTION RESPIRATORY (INHALATION) at 07:11

## 2020-11-08 RX ADMIN — MUPIROCIN: 20 OINTMENT TOPICAL at 10:11

## 2020-11-08 RX ADMIN — ASPIRIN 81 MG: 81 TABLET, COATED ORAL at 07:11

## 2020-11-08 RX ADMIN — PIPERACILLIN AND TAZOBACTAM 4.5 G: 4; .5 INJECTION, POWDER, LYOPHILIZED, FOR SOLUTION INTRAVENOUS; PARENTERAL at 10:11

## 2020-11-08 RX ADMIN — SODIUM CHLORIDE 1000 ML: 0.9 INJECTION, SOLUTION INTRAVENOUS at 01:11

## 2020-11-08 NOTE — ASSESSMENT & PLAN NOTE
Likely secondary to dehydration and rhabdomyolysis  Improved with IV fluids on repeat BMP  Will continue IV fluids and monitor labs

## 2020-11-08 NOTE — SUBJECTIVE & OBJECTIVE
Past Medical History:   Diagnosis Date    Anxiety     Depression     Melanoma        Past Surgical History:   Procedure Laterality Date    EXCISION OF MELANOMA      back of neck       Review of patient's allergies indicates:  No Known Allergies    No current facility-administered medications on file prior to encounter.      Current Outpatient Medications on File Prior to Encounter   Medication Sig    FLUoxetine 40 MG capsule Take 1 capsule (40 mg total) by mouth once daily.    traZODone (DESYREL) 50 MG tablet Take 1 tablet (50 mg total) by mouth every evening.     Family History     Problem Relation (Age of Onset)    ADD / ADHD Brother    Hyperlipidemia Mother        Tobacco Use    Smoking status: Never Smoker    Smokeless tobacco: Never Used   Substance and Sexual Activity    Alcohol use: No    Drug use: Yes     Comment: heroin    Sexual activity: Not on file     Review of Systems   Unable to perform ROS: Acuity of condition     Objective:     Vital Signs (Most Recent):  Temp: 98.7 °F (37.1 °C) (11/08/20 1334)  Pulse: 93 (11/08/20 1632)  Resp: 16 (11/08/20 1602)  BP: (!) 130/97 (11/08/20 1632)  SpO2: 95 % (11/08/20 1632) Vital Signs (24h Range):  Temp:  [98.7 °F (37.1 °C)] 98.7 °F (37.1 °C)  Pulse:  [86-94] 93  Resp:  [15-18] 16  SpO2:  [89 %-98 %] 95 %  BP: (105-130)/(64-97) 130/97     Weight: 68 kg (150 lb)  Body mass index is 22.81 kg/m².    Physical Exam  Vitals signs reviewed.   Constitutional:       General: He is not in acute distress.     Appearance: He is well-developed.      Comments: Restless, awake   HENT:      Head: Normocephalic.      Comments: Bump on forehead  Eyes:      Extraocular Movements: Extraocular movements intact.      Pupils: Pupils are equal, round, and reactive to light.   Neck:      Musculoskeletal: Normal range of motion and neck supple.   Cardiovascular:      Rate and Rhythm: Normal rate and regular rhythm.   Pulmonary:      Effort: Pulmonary effort is normal. No  respiratory distress.      Comments: Crackles at bases  Abdominal:      General: Bowel sounds are normal. There is no distension.      Palpations: Abdomen is soft.      Tenderness: There is no abdominal tenderness.   Musculoskeletal:         General: No swelling.      Comments: Right arm weakness, not able to lift it up, able to close fist and has flexion, extension of elbow.  Moves both legs   Skin:     General: Skin is warm.      Findings: No rash.   Neurological:      Comments: Oriented to person,place   Psychiatric:      Comments: restless           CRANIAL NERVES     CN III, IV, VI   Pupils are equal, round, and reactive to light.       Significant Labs:   BMP:   Recent Labs   Lab 11/08/20  1333 11/08/20  1610   * 112*   * 134*   K 7.0* 5.9*   CL 98 101   CO2 18* 20*   BUN 33* 27*   CREATININE 2.2* 1.7*   CALCIUM 8.9 8.4*   MG 1.8  --      CBC:   Recent Labs   Lab 11/08/20  1333   WBC 19.53*   HGB 16.6   HCT 50.3          Significant Imaging: I have reviewed all pertinent imaging results/findings within the past 24 hours.

## 2020-11-08 NOTE — ED TRIAGE NOTES
Pt presents tot he ED w/ c/o bilateral hearing loss and right arm weakness x today.  Endorses alcohol and heroin use.  Reports falling and hitting forehead today.  Pt is slumberous but will awake to voice.  Answering questions appropriately.  Endorses right arm weakness but still able to move extremity.

## 2020-11-08 NOTE — H&P
"[facility] Medicine  History & Physical    Patient Name: Garrick White  MRN: [id number]  Admission Date: 11/8/2020  Attending Physician: Jaclyn Christensen MD   Primary Care Provider: Ilana Nichole NP         Patient information was obtained from ER records.     Subjective:     Principal Problem:Non-traumatic rhabdomyolysis    Chief Complaint:   Chief Complaint   Patient presents with    Altered Mental Status     pt reports he " can't lift right arm, can't hear" since yesterday. pin point pupils noted, pt admits to heroine use. pt not answering questions appropraitley.         HPI: 35-year-old male with history of anxiety and depression, IV heroin use, came in complaining of right arm weakness.  Patient is not able to give much history.  He states I am very uncomfortable and do not feel good.  He is specially uncomfortable  not able to move his right arm.  Patient is not able to answer all the questions.  He did say that he think he overdosed.  He does not remember falling.  He was not able to tell me whether he drinks alcohol or not but admitted to prior physician.  His workup in the ED showed rhabdomyolysis with acute kidney injury and hyperkalemia of 7.0.  He also had elevated lactate peak of 4.0.  Patient was given normal saline and was started on bicarb drip.  His repeat potassium was 5.9.  His EKG did not show acute changes.  CT head and CT cervical spine were okay.  Chest x-ray showed interstitial prominence in the left lower lung.  The patient is being admitted to ICU for further management.    Past Medical History:   Diagnosis Date    Anxiety     Depression     Melanoma        Past Surgical History:   Procedure Laterality Date    EXCISION OF MELANOMA      back of neck       Review of patient's allergies indicates:  No Known Allergies    No current facility-administered medications on file prior to encounter.      Current Outpatient Medications on File Prior to Encounter "   Medication Sig    FLUoxetine 40 MG capsule Take 1 capsule (40 mg total) by mouth once daily.    traZODone (DESYREL) 50 MG tablet Take 1 tablet (50 mg total) by mouth every evening.     Family History     Problem Relation (Age of Onset)    ADD / ADHD Brother    Hyperlipidemia Mother        Tobacco Use    Smoking status: Never Smoker    Smokeless tobacco: Never Used   Substance and Sexual Activity    Alcohol use: No    Drug use: Yes     Comment: heroin    Sexual activity: Not on file     Review of Systems   Unable to perform ROS: Acuity of condition     Objective:     Vital Signs (Most Recent):  Temp: 98.7 °F (37.1 °C) (11/08/20 1334)  Pulse: 93 (11/08/20 1632)  Resp: 16 (11/08/20 1602)  BP: (!) 130/97 (11/08/20 1632)  SpO2: 95 % (11/08/20 1632) Vital Signs (24h Range):  Temp:  [98.7 °F (37.1 °C)] 98.7 °F (37.1 °C)  Pulse:  [86-94] 93  Resp:  [15-18] 16  SpO2:  [89 %-98 %] 95 %  BP: (105-130)/(64-97) 130/97     Weight: 68 kg (150 lb)  Body mass index is 22.81 kg/m².    Physical Exam  Vitals signs reviewed.   Constitutional:       General: He is not in acute distress.     Appearance: He is well-developed.      Comments: Restless, awake   HENT:      Head: Normocephalic.      Comments: Bump on forehead  Eyes:      Extraocular Movements: Extraocular movements intact.      Pupils: Pupils are equal, round, and reactive to light.   Neck:      Musculoskeletal: Normal range of motion and neck supple.   Cardiovascular:      Rate and Rhythm: Normal rate and regular rhythm.   Pulmonary:      Effort: Pulmonary effort is normal. No respiratory distress.      Comments: Crackles at bases  Abdominal:      General: Bowel sounds are normal. There is no distension.      Palpations: Abdomen is soft.      Tenderness: There is no abdominal tenderness.   Musculoskeletal:         General: No swelling.      Comments: Right arm weakness, not able to lift it up, able to close fist and has flexion, extension of elbow.  Moves both legs    Skin:     General: Skin is warm.      Findings: No rash.   Neurological:      Comments: Oriented to person,place   Psychiatric:      Comments: restless           CRANIAL NERVES     CN III, IV, VI   Pupils are equal, round, and reactive to light.       Significant Labs:   BMP:   Recent Labs   Lab 11/08/20  1333 11/08/20  1610   * 112*   * 134*   K 7.0* 5.9*   CL 98 101   CO2 18* 20*   BUN 33* 27*   CREATININE 2.2* 1.7*   CALCIUM 8.9 8.4*   MG 1.8  --      CBC:   Recent Labs   Lab 11/08/20  1333   WBC 19.53*   HGB 16.6   HCT 50.3          Significant Imaging: I have reviewed all pertinent imaging results/findings within the past 24 hours.    Assessment/Plan:     * Non-traumatic rhabdomyolysis  Questionable history of fall   Patient with CK level of 12,921  Continue IV fluids with bicarb and monitor CK levels      Encephalopathy, metabolic  Likely due to drug use  Monitor       Elevated LFTs  Likely due to rhabdo and ? Alcohol use  Monitor labs      Right arm weakness  ? Peripheral neuropathy with likely fall  Ct head negative  Will check mri brain to r/o central cause      Polysubstance (excluding opioids) dependence, daily use  Patient with iv drug use  uds positive for cocaine and opiates  Also likely alcohol use  Recent hiv, hep panel  Negative 09/20  Will give him banana bag  Prn ativan for withdrawal        Aspiration pneumonia  ? Aspiration pna with leukocytosis, lactic acidosis, o2 requirement, subtle change on cxr  Will start on empiric abx  resp cx if able to   duonebs      Lactic acidosis  Could be due to rhabdo/infection  Will repeat levels        Hyperkalemia  Likely due to rhabdomyolysis  Improved with IV fluids  Patient also has acidosis on the labs  Will continue him on bicarb in IV fluids and monitor potassium        DANYA (acute kidney injury)  Likely secondary to dehydration and rhabdomyolysis  Improved with IV fluids on repeat BMP  Will continue IV fluids and monitor  labs        VTE Risk Mitigation (From admission, onward)         Ordered     IP VTE LOW RISK PATIENT  Once      11/08/20 1656     Place sequential compression device  Until discontinued      11/08/20 1656                 Critical care time spent on patient 35 minutes.    Jaclyn Christensen MD  Department of Hospital Medicine   [facility]

## 2020-11-08 NOTE — ASSESSMENT & PLAN NOTE
Patient with iv drug use  uds positive for cocaine and opiates  Also likely alcohol use  Recent hiv, hep panel  Negative 09/20  Will give him banana bag  Prn ativan for withdrawal

## 2020-11-08 NOTE — ED NOTES
Pt not answering questions appropriately in triage. Dr. Phillips notified and at bedside to exam pt immediatly. Per Dr. Phillips, do not call stroke code

## 2020-11-08 NOTE — NURSING
Patient family called per patient request to notify them that patient was in the ICU, spoke to pt carol, password given.

## 2020-11-08 NOTE — NURSING
Pt admitted from the ED. Disoriented to place. VSS. Denies pain. States he continues to have hearing loss from both ears. Right arm weakness noted, pt has equal  but cannot raise right arm. Awaiting to go to MRI. Will continue to monitor.    yes

## 2020-11-08 NOTE — ASSESSMENT & PLAN NOTE
Likely due to rhabdomyolysis  Improved with IV fluids  Patient also has acidosis on the labs  Will continue him on bicarb in IV fluids and monitor potassium

## 2020-11-08 NOTE — HPI
35-year-old male with history of anxiety and depression, IV heroin use, came in complaining of right arm weakness.  Patient is not able to give much history.  He states I am very uncomfortable and do not feel good.  He is specially uncomfortable  not able to move his right arm.  Patient is not able to answer all the questions.  He did say that he think he overdosed.  He does not remember falling.  He was not able to tell me whether he drinks alcohol or not but admitted to prior physician.  His workup in the ED showed rhabdomyolysis with acute kidney injury and hyperkalemia of 7.0.  He also had elevated lactate peak of 4.0.  Patient was given normal saline and was started on bicarb drip.  His repeat potassium was 5.9.  His EKG did not show acute changes.  CT head and CT cervical spine were okay.  Chest x-ray showed interstitial prominence in the left lower lung.  The patient is being admitted to ICU for further management.

## 2020-11-08 NOTE — ED PROVIDER NOTES
"Encounter Date: 11/8/2020    SCRIBE #1 NOTE: I, Gary Sierra, am scribing for, and in the presence of, Dr. Phillips.       History     Chief Complaint   Patient presents with    Altered Mental Status     pt reports he " can't lift right arm, can't hear" since yesterday. pin point pupils noted, pt admits to heroine use. pt not answering questions appropraitley.      Time seen by provider: 1:15 PM    This is a 35 y.o. male who presents with complaint of right arm weakness following drug and alcohol consumption and a potential fall. His friend reported that he consumed alcohol and used drugs including heroin, last night and that the pt fell down after waking up this morning. Pt confirms that last night, he "got loaded" and "drank anything", admitting to drug and heroin use, and reports that he "passed out". He was unsure of whether he fell. He denies use of methamphetamines or cocaine. He reports weakness, numbness, and tingling of his right arm. He also reports neck pain on the right side and says it feels "numb." He reports significant hearing loss in both ears upon waking, but states that his vision is normal. He denies a headache or any recent fever or SOB. Pt smokes tobacco, drinks alcohol, and uses illicit drugs including heroin.    The history is provided by the patient and a friend.     Review of patient's allergies indicates:  No Known Allergies  Past Medical History:   Diagnosis Date    Anxiety     Depression     Melanoma      Past Surgical History:   Procedure Laterality Date    EXCISION OF MELANOMA      back of neck     Family History   Problem Relation Age of Onset    Hyperlipidemia Mother     ADD / ADHD Brother      Social History     Tobacco Use    Smoking status: Never Smoker    Smokeless tobacco: Never Used   Substance Use Topics    Alcohol use: No    Drug use: Yes     Comment: heroin     Review of Systems   Constitutional: Negative for chills and fever.   HENT: Positive for hearing loss. " "Negative for congestion and sore throat.    Eyes: Negative for visual disturbance.   Respiratory: Negative for cough and shortness of breath.    Cardiovascular: Negative for chest pain and palpitations.   Gastrointestinal: Negative for abdominal pain, diarrhea and vomiting.   Genitourinary: Negative for decreased urine volume, dysuria and frequency.   Musculoskeletal: Positive for neck pain. Negative for joint swelling and neck stiffness.   Skin: Negative for rash and wound.   Neurological: Positive for weakness (Right arm) and numbness (Right arm). Negative for headaches.        Tingling of right arm   Psychiatric/Behavioral: Negative for behavioral problems.       Physical Exam     Initial Vitals   BP Pulse Resp Temp SpO2   11/08/20 1413 11/08/20 1312 11/08/20 1310 11/08/20 1334 11/08/20 1310   124/81 90 15 98.7 °F (37.1 °C) (!) 89 %      MAP       --                Vitals:    11/08/20 1558 11/08/20 1602 11/08/20 1612 11/08/20 1615   BP:  118/87     Pulse:  91 94    Resp:  16     Temp:       TempSrc:       SpO2:  97% 96%    Weight: 68 kg (150 lb)      Height:    5' 8" (1.727 m)    11/08/20 1632   BP: (!) 130/97   Pulse: 93   Resp:    Temp:    TempSrc:    SpO2: 95%   Weight:    Height:      Physical Exam    Nursing note and vitals reviewed.  Constitutional: He appears well-developed and well-nourished.   HENT:   Head: Normocephalic.   Mouth/Throat: Mucous membranes are dry.   Normal TMs bilaterally.   Eyes: Conjunctivae and EOM are normal. Pupils are equal, round, and reactive to light.   Neck: Normal range of motion. Neck supple.   Right paraspinal muscular tenderness.  No midline tenderness.   Cardiovascular: Normal rate, regular rhythm and normal heart sounds. Exam reveals no gallop and no friction rub.    No murmur heard.  BUE capillary refill is 4 seconds.   Pulmonary/Chest: Breath sounds normal. No respiratory distress. He has no wheezes. He has no rhonchi. He has no rales.   Abdominal: Soft. There is no " abdominal tenderness. There is no rebound and no guarding.   Musculoskeletal: No tenderness.   Neurological: He is alert and oriented to person, place, and time. No cranial nerve deficit or sensory deficit.   Right upper extremity with weakness, unable to elevate.  There is weakness in the distribution of the posterior interosseous branch of median nerve.   strength is equal bilateral upper extremities.  He reports equal sensation bilateral upper extremities.   Skin: Skin is warm and dry.   Abrasion and swelling to forehead. Erythema to lateral right forearm. Sunburned appearance.   Psychiatric: He has a normal mood and affect. His behavior is normal. Thought content normal.         ED Course   Critical Care    Date/Time: 11/8/2020 4:48 PM  Performed by: Lori Phillips MD  Authorized by: Lori Phillips MD   Direct patient critical care time: 12 minutes  Additional history critical care time: 6 minutes  Ordering / reviewing critical care time: 6 minutes  Documentation critical care time: 6 minutes  Consulting other physicians critical care time: 6 minutes  Total critical care time (exclusive of procedural time) : 36 minutes  Critical care time was exclusive of separately billable procedures and treating other patients.  Critical care was necessary to treat or prevent imminent or life-threatening deterioration of the following conditions: metabolic crisis, renal failure and toxidrome.  Critical care was time spent personally by me on the following activities: development of treatment plan with patient or surrogate, discussions with consultants, interpretation of cardiac output measurements, evaluation of patient's response to treatment, examination of patient, obtaining history from patient or surrogate, ordering and performing treatments and interventions, ordering and review of laboratory studies, ordering and review of radiographic studies, pulse oximetry, re-evaluation of patient's condition and review of  old charts.        Labs Reviewed   CBC W/ AUTO DIFFERENTIAL - Abnormal; Notable for the following components:       Result Value    WBC 19.53 (*)     Gran # (ANC) 17.1 (*)     Immature Grans (Abs) 0.07 (*)     Lymph # 0.6 (*)     Mono # 1.7 (*)     Gran % 87.2 (*)     Lymph % 3.3 (*)     All other components within normal limits   COMPREHENSIVE METABOLIC PANEL - Abnormal; Notable for the following components:    Sodium 132 (*)     Potassium 7.0 (*)     CO2 18 (*)     Glucose 121 (*)     BUN 33 (*)     Creatinine 2.2 (*)      (*)     ALT 94 (*)     eGFR if  44 (*)     eGFR if non  38 (*)     All other components within normal limits    Narrative:     K critical result(s) called and verbal readback obtained from Daksha Salazar RN by [facility] 11/08/2020 14:26   URINALYSIS, REFLEX TO URINE CULTURE - Abnormal; Notable for the following components:    Protein, UA 1+ (*)     Ketones, UA Trace (*)     Occult Blood UA 3+ (*)     All other components within normal limits    Narrative:     Specimen Source->Urine   CK - Abnormal; Notable for the following components:    CPK 41433 (*)     All other components within normal limits   ACETAMINOPHEN LEVEL - Abnormal; Notable for the following components:    Acetaminophen (Tylenol), Serum <3.0 (*)     All other components within normal limits   LACTIC ACID, PLASMA - Abnormal; Notable for the following components:    Lactate (Lactic Acid) 4.0 (*)     All other components within normal limits    Narrative:     LA critical result(s) called and verbal readback obtained from Daksha Salazar RN by [facility] 11/08/2020 14:26   URINALYSIS MICROSCOPIC - Abnormal; Notable for the following components:    Hyaline Casts, UA 4 (*)     All other components within normal limits    Narrative:     Specimen Source->Urine   BASIC METABOLIC PANEL - Abnormal; Notable for the following components:    Sodium 134 (*)     Potassium 5.9 (*)     CO2 20 (*)     Glucose 112 (*)     BUN 27  (*)     Creatinine 1.7 (*)     Calcium 8.4 (*)     eGFR if non  51 (*)     All other components within normal limits   MAGNESIUM   DRUG SCREEN PANEL, URINE EMERGENCY    Narrative:     Specimen Source->Urine   ALCOHOL,MEDICAL (ETHANOL)   SARS-COV-2 RDRP GENE     EKG Readings: (Independently Interpreted)   NSR at rate of 90, no STEMI, no ectopy. Early repolarization changes present.       Imaging Results          CT CERVICAL SPINE WITHOUT CONTRAST (Final result)  Result time 11/08/20 14:31:04    Final result by Myles Hinton MD (11/08/20 14:31:04)                 Impression:      1. No acute intracranial findings.  2. No acute cervical spine fracture.      Electronically signed by: Myles Hinton  Date:    11/08/2020  Time:    14:31             Narrative:    EXAMINATION:  CT HEAD WITHOUT CONTRAST; CT CERVICAL SPINE WITHOUT CONTRAST    CLINICAL HISTORY:  Neuro deficit, acute, stroke suspected;; Neck pain, abnormal neuro exam;    TECHNIQUE:  Low dose axial images were obtained through the head and cervical spine.  Coronal and sagittal reformations were also performed. Contrast was not administered.    COMPARISON:  None.    FINDINGS:  Head:    Blood: No acute intracranial hemorrhage.    Parenchyma: No definite loss of gray-white differentiation to suggest acute or subacute transcortical infarct.    Ventricles/Extra-axial spaces: No abnormal extra-axial fluid collection. Basal cisterns are patent.    Vessels: Unremarkable by unenhanced technique    Orbits: Unremarkable.    Scalp: Small anterior scalp soft tissue contusion.    Skull: There are no depressed skull fractures or destructive bone lesions.    Sinuses and mastoids: Mastoid minor mucosal thickening and retention cysts.    Other findings: None    Cervical spine:    Fractures: No acute fractures    Alignment: There is no significant vertebral subluxation  Atlanto-axial and atlanto-occipital joints: Atlanto-axial and atlanto-occipital intervals  are not widened.  Facet joints: There is no traumatic facet joint widening.  Vertebral bodies: Normal  Discs: Unremarkable  Spinal canal and foraminal narrowing: Although CT does not optimally evaluate the soft tissue contents of the spinal canal and foramina, no critical stenosis is suggested.  Paraspinal soft tissues: Unremarkable.    Upper Lungs:Clear                               CT Head Without Contrast (Final result)  Result time 11/08/20 14:31:04    Final result by Myles Hinton MD (11/08/20 14:31:04)                 Impression:      1. No acute intracranial findings.  2. No acute cervical spine fracture.      Electronically signed by: Myles Hinton  Date:    11/08/2020  Time:    14:31             Narrative:    EXAMINATION:  CT HEAD WITHOUT CONTRAST; CT CERVICAL SPINE WITHOUT CONTRAST    CLINICAL HISTORY:  Neuro deficit, acute, stroke suspected;; Neck pain, abnormal neuro exam;    TECHNIQUE:  Low dose axial images were obtained through the head and cervical spine.  Coronal and sagittal reformations were also performed. Contrast was not administered.    COMPARISON:  None.    FINDINGS:  Head:    Blood: No acute intracranial hemorrhage.    Parenchyma: No definite loss of gray-white differentiation to suggest acute or subacute transcortical infarct.    Ventricles/Extra-axial spaces: No abnormal extra-axial fluid collection. Basal cisterns are patent.    Vessels: Unremarkable by unenhanced technique    Orbits: Unremarkable.    Scalp: Small anterior scalp soft tissue contusion.    Skull: There are no depressed skull fractures or destructive bone lesions.    Sinuses and mastoids: Mastoid minor mucosal thickening and retention cysts.    Other findings: None    Cervical spine:    Fractures: No acute fractures    Alignment: There is no significant vertebral subluxation  Atlanto-axial and atlanto-occipital joints: Atlanto-axial and atlanto-occipital intervals are not widened.  Facet joints: There is no traumatic  "facet joint widening.  Vertebral bodies: Normal  Discs: Unremarkable  Spinal canal and foraminal narrowing: Although CT does not optimally evaluate the soft tissue contents of the spinal canal and foramina, no critical stenosis is suggested.  Paraspinal soft tissues: Unremarkable.    Upper Lungs:Clear                               X-Ray Chest AP Portable (Final result)  Result time 11/08/20 14:18:53    Final result by Kaushal Jacob MD (11/08/20 14:18:53)                 Impression:      As above.      Electronically signed by: Kaushal Jacob MD  Date:    11/08/2020  Time:    14:18             Narrative:    EXAMINATION:  XR CHEST AP PORTABLE    CLINICAL HISTORY:  cough;    TECHNIQUE:  Single frontal view of the chest was performed.    COMPARISON:  None    FINDINGS:  No consolidation, pleural effusion or pneumothorax.  Interstitial prominence noted in the left lower lung zone.  Cardiac silhouette is unremarkable.                              X-Rays:   Independently Interpreted Readings:   Chest X-Ray: Interstitial prominence w/o lobar infiltrate, no pneumothorax, no effusion, will defer to radiology.     Medical Decision Making:   History:   I obtained history from: someone other than patient.  Old Medical Records: I decided to obtain old medical records.  Clinical Tests:   Lab Tests: Ordered and Reviewed  Radiological Study: Ordered and Reviewed  ED Management:  35-year-old male presents with complaint of right arm weakness, admits to heavy drug use last night.  Vital signs are benign, afebrile.  On exam mucous membranes are dry, there is focal weakness in the right upper extremity without sensory deficit or cranial nerve deficit.  I do not suspect a CVA but rather peripheral nerve injury such as "Saturday night palsy", since symptoms were present when he awakened from passing out.  CT of the head and cervical spine show no acute process.  Labs show significant metabolic disturbance with rhabdomyolysis.  Patient " has urinalysis, CPK, and renal findings on lab workup for diagnosis of rhabdomyolysis.  I suspect this is drug induced.  Although there is severe hyperkalemia, no associated EKG change.  I discussed with Nephrology, bicarb drip is started.  Will monitor closely.  There is significant elevation in lactic acid, I suspect this is from hypoperfusion rather than sepsis.  He does complain of a cough, chest x-ray and COVID swab are negative.  Radiologist says increased interstitial prominence left lower lobe, but I do not think this represents bacterial pneumonia.  He is admitted in serious condition for further care.            Scribe Attestation:   Scribe #1: I performed the above scribed service and the documentation accurately describes the services I performed. I attest to the accuracy of the note.    Attending Attestation:           Physician Attestation for Scribe:  Physician Attestation Statement for Scribe #1: I, Dr. Phillips, reviewed documentation, as scribed by Gayr Sierra in my presence, and it is both accurate and complete.                 ED Course as of Nov 08 1643   Sun Nov 08, 2020   [id number] Paged nephrology to discuss the case.    [AK]   2319 I discussed the case with Dr. Rushing - will start bicarb drip and admit, repeat BMP.  He will follow.    [AK]   6798 I paged the hospitalist to discuss the case.    [AK]      ED Course User Index  [AK] Lori Phillips MD            Clinical Impression:     ICD-10-CM ICD-9-CM   1. Non-traumatic rhabdomyolysis  M62.82 728.88   2. Weakness  R53.1 780.79   3. DANYA (acute kidney injury)  N17.9 584.9   4. Lactic acidosis  E87.2 276.2   5. Hyperkalemia  E87.5 276.7   6. Elevated LFTs  R79.89 790.6   7. Right arm weakness  R29.898 729.89                          ED Disposition Condition    Admit                             Lori Phillips MD  11/08/20 [id number]

## 2020-11-08 NOTE — ASSESSMENT & PLAN NOTE
? Aspiration pna with leukocytosis, lactic acidosis, o2 requirement, subtle change on cxr  Will start on empiric abx  resp cx if able to   duonebs

## 2020-11-08 NOTE — ASSESSMENT & PLAN NOTE
Questionable history of fall   Patient with CK level of 12,921  Continue IV fluids with bicarb and monitor CK levels

## 2020-11-08 NOTE — ASSESSMENT & PLAN NOTE
? Peripheral neuropathy with likely fall  Ct head negative  Will check mri brain to r/o central cause

## 2020-11-09 DIAGNOSIS — R29.898 RIGHT ARM WEAKNESS: Primary | ICD-10-CM

## 2020-11-09 PROBLEM — E87.5 HYPERKALEMIA: Status: RESOLVED | Noted: 2020-11-08 | Resolved: 2020-11-09

## 2020-11-09 PROBLEM — G93.41 ENCEPHALOPATHY, METABOLIC: Status: RESOLVED | Noted: 2020-11-08 | Resolved: 2020-11-09

## 2020-11-09 PROBLEM — Z72.0 TOBACCO USE: Status: ACTIVE | Noted: 2020-11-09

## 2020-11-09 LAB
ALBUMIN SERPL BCP-MCNC: 3.3 G/DL (ref 3.5–5.2)
ALP SERPL-CCNC: 55 U/L (ref 55–135)
ALT SERPL W/O P-5'-P-CCNC: 82 U/L (ref 10–44)
ANION GAP SERPL CALC-SCNC: 13 MMOL/L (ref 8–16)
ASCENDING AORTA: 3.17 CM
AST SERPL-CCNC: 196 U/L (ref 10–40)
AV INDEX (PROSTH): 0.58
AV MEAN GRADIENT: 4 MMHG
AV PEAK GRADIENT: 0 MMHG
AV VALVE AREA: 2.75 CM2
AV VELOCITY RATIO: 93
BASOPHILS # BLD AUTO: 0.02 K/UL (ref 0–0.2)
BASOPHILS NFR BLD: 0.1 % (ref 0–1.9)
BILIRUB SERPL-MCNC: 0.9 MG/DL (ref 0.1–1)
BSA FOR ECHO PROCEDURE: 1.83 M2
BUN SERPL-MCNC: 13 MG/DL (ref 6–20)
CALCIUM SERPL-MCNC: 8.2 MG/DL (ref 8.7–10.5)
CHLORIDE SERPL-SCNC: 99 MMOL/L (ref 95–110)
CHOLEST SERPL-MCNC: 88 MG/DL (ref 120–199)
CHOLEST/HDLC SERPL: 2.7 {RATIO} (ref 2–5)
CK SERPL-CCNC: 8269 U/L (ref 20–200)
CO2 SERPL-SCNC: 27 MMOL/L (ref 23–29)
CREAT SERPL-MCNC: 0.9 MG/DL (ref 0.5–1.4)
CV ECHO LV RWT: 0.3 CM
DIFFERENTIAL METHOD: ABNORMAL
DOP CALC AO PEAK VEL: 0.01 M/S
DOP CALC AO VTI: 28.12 CM
DOP CALC LVOT AREA: 4.8 CM2
DOP CALC LVOT DIAMETER: 2.47 CM
DOP CALC LVOT PEAK VEL: 0.93 M/S
DOP CALC LVOT STROKE VOLUME: 77.44 CM3
DOP CALCLVOT PEAK VEL VTI: 16.17 CM
E WAVE DECELERATION TIME: 104.03 MSEC
E/A RATIO: 1.14
E/E' RATIO: 4.75 M/S
ECHO LV POSTERIOR WALL: 0.74 CM (ref 0.6–1.1)
EOSINOPHIL # BLD AUTO: 0 K/UL (ref 0–0.5)
EOSINOPHIL NFR BLD: 0 % (ref 0–8)
ERYTHROCYTE [DISTWIDTH] IN BLOOD BY AUTOMATED COUNT: 13.2 % (ref 11.5–14.5)
EST. GFR  (AFRICAN AMERICAN): >60 ML/MIN/1.73 M^2
EST. GFR  (NON AFRICAN AMERICAN): >60 ML/MIN/1.73 M^2
FOLATE SERPL-MCNC: >40 NG/ML (ref 4–24)
FRACTIONAL SHORTENING: 22 % (ref 28–44)
GLUCOSE SERPL-MCNC: 138 MG/DL (ref 70–110)
HCT VFR BLD AUTO: 40.1 % (ref 40–54)
HDLC SERPL-MCNC: 33 MG/DL (ref 40–75)
HDLC SERPL: 37.5 % (ref 20–50)
HGB BLD-MCNC: 13.7 G/DL (ref 14–18)
HIV1+2 IGG SERPL QL IA.RAPID: NORMAL
IMM GRANULOCYTES # BLD AUTO: 0.06 K/UL (ref 0–0.04)
IMM GRANULOCYTES NFR BLD AUTO: 0.4 % (ref 0–0.5)
INTERVENTRICULAR SEPTUM: 0.76 CM (ref 0.6–1.1)
IVRT: 68.51 MSEC
LA MAJOR: 3.41 CM
LA MINOR: 3.41 CM
LA WIDTH: 4.16 CM
LDLC SERPL CALC-MCNC: 41.2 MG/DL (ref 63–159)
LEFT ATRIUM SIZE: 3.07 CM
LEFT ATRIUM VOLUME INDEX MOD: 15.7 ML/M2
LEFT ATRIUM VOLUME INDEX: 20 ML/M2
LEFT ATRIUM VOLUME MOD: 29 CM3
LEFT ATRIUM VOLUME: 37.02 CM3
LEFT INTERNAL DIMENSION IN SYSTOLE: 3.89 CM (ref 2.1–4)
LEFT VENTRICLE DIASTOLIC VOLUME INDEX: 64.19 ML/M2
LEFT VENTRICLE DIASTOLIC VOLUME: 118.57 ML
LEFT VENTRICLE MASS INDEX: 68 G/M2
LEFT VENTRICLE SYSTOLIC VOLUME INDEX: 35.5 ML/M2
LEFT VENTRICLE SYSTOLIC VOLUME: 65.65 ML
LEFT VENTRICULAR INTERNAL DIMENSION IN DIASTOLE: 5.01 CM (ref 3.5–6)
LEFT VENTRICULAR MASS: 125.52 G
LV LATERAL E/E' RATIO: 4.07 M/S
LV SEPTAL E/E' RATIO: 5.7 M/S
LYMPHOCYTES # BLD AUTO: 0.8 K/UL (ref 1–4.8)
LYMPHOCYTES NFR BLD: 4.9 % (ref 18–48)
MAGNESIUM SERPL-MCNC: 1.7 MG/DL (ref 1.6–2.6)
MCH RBC QN AUTO: 29.9 PG (ref 27–31)
MCHC RBC AUTO-ENTMCNC: 34.2 G/DL (ref 32–36)
MCV RBC AUTO: 88 FL (ref 82–98)
MONOCYTES # BLD AUTO: 1.6 K/UL (ref 0.3–1)
MONOCYTES NFR BLD: 9.4 % (ref 4–15)
MV PEAK A VEL: 0.5 M/S
MV PEAK E VEL: 0.57 M/S
MV STENOSIS PRESSURE HALF TIME: 30.17 MS
MV VALVE AREA P 1/2 METHOD: 7.29 CM2
NEUTROPHILS # BLD AUTO: 14.6 K/UL (ref 1.8–7.7)
NEUTROPHILS NFR BLD: 85.2 % (ref 38–73)
NONHDLC SERPL-MCNC: 55 MG/DL
NRBC BLD-RTO: 0 /100 WBC
PHOSPHATE SERPL-MCNC: 1.6 MG/DL (ref 2.7–4.5)
PISA MRMAX VEL: 0.03 M/S
PISA TR MAX VEL: 3.48 M/S
PLATELET # BLD AUTO: 189 K/UL (ref 150–350)
PLATELET BLD QL SMEAR: ABNORMAL
PMV BLD AUTO: 10.2 FL (ref 9.2–12.9)
POTASSIUM SERPL-SCNC: 3.8 MMOL/L (ref 3.5–5.1)
PROT SERPL-MCNC: 5.7 G/DL (ref 6–8.4)
PULM VEIN S/D RATIO: 1.2
PV PEAK D VEL: 0.4 M/S
PV PEAK S VEL: 0.48 M/S
PV PEAK VELOCITY: 0.72 CM/S
RA MAJOR: 4.78 CM
RA PRESSURE: 3 MMHG
RA WIDTH: 3.68 CM
RBC # BLD AUTO: 4.58 M/UL (ref 4.6–6.2)
SINUS: 3.77 CM
SODIUM SERPL-SCNC: 139 MMOL/L (ref 136–145)
STJ: 2.97 CM
TDI LATERAL: 0.14 M/S
TDI SEPTAL: 0.1 M/S
TDI: 0.12 M/S
TR MAX PG: 48 MMHG
TRICUSPID ANNULAR PLANE SYSTOLIC EXCURSION: 1.52 CM
TRIGL SERPL-MCNC: 69 MG/DL (ref 30–150)
TV REST PULMONARY ARTERY PRESSURE: 51 MMHG
VIT B12 SERPL-MCNC: 870 PG/ML (ref 210–950)
WBC # BLD AUTO: 17.08 K/UL (ref 3.9–12.7)

## 2020-11-09 PROCEDURE — 92610 EVALUATE SWALLOWING FUNCTION: CPT

## 2020-11-09 PROCEDURE — 94761 N-INVAS EAR/PLS OXIMETRY MLT: CPT

## 2020-11-09 PROCEDURE — 82746 ASSAY OF FOLIC ACID SERUM: CPT

## 2020-11-09 PROCEDURE — 99233 SBSQ HOSP IP/OBS HIGH 50: CPT | Mod: ,,, | Performed by: INTERNAL MEDICINE

## 2020-11-09 PROCEDURE — 99900035 HC TECH TIME PER 15 MIN (STAT)

## 2020-11-09 PROCEDURE — 82607 VITAMIN B-12: CPT

## 2020-11-09 PROCEDURE — 94640 AIRWAY INHALATION TREATMENT: CPT

## 2020-11-09 PROCEDURE — 84100 ASSAY OF PHOSPHORUS: CPT

## 2020-11-09 PROCEDURE — 25000003 PHARM REV CODE 250: Performed by: EMERGENCY MEDICINE

## 2020-11-09 PROCEDURE — 80061 LIPID PANEL: CPT

## 2020-11-09 PROCEDURE — 63600175 PHARM REV CODE 636 W HCPCS: Performed by: INTERNAL MEDICINE

## 2020-11-09 PROCEDURE — 80074 ACUTE HEPATITIS PANEL: CPT

## 2020-11-09 PROCEDURE — 25000003 PHARM REV CODE 250: Performed by: INTERNAL MEDICINE

## 2020-11-09 PROCEDURE — S4991 NICOTINE PATCH NONLEGEND: HCPCS | Performed by: INTERNAL MEDICINE

## 2020-11-09 PROCEDURE — 25000003 PHARM REV CODE 250: Performed by: PHYSICIAN ASSISTANT

## 2020-11-09 PROCEDURE — 25000242 PHARM REV CODE 250 ALT 637 W/ HCPCS: Performed by: INTERNAL MEDICINE

## 2020-11-09 PROCEDURE — 11000001 HC ACUTE MED/SURG PRIVATE ROOM

## 2020-11-09 PROCEDURE — 83036 HEMOGLOBIN GLYCOSYLATED A1C: CPT

## 2020-11-09 PROCEDURE — 80053 COMPREHEN METABOLIC PANEL: CPT

## 2020-11-09 PROCEDURE — 86703 HIV-1/HIV-2 1 RESULT ANTBDY: CPT

## 2020-11-09 PROCEDURE — 99233 PR SUBSEQUENT HOSPITAL CARE,LEVL III: ICD-10-PCS | Mod: ,,, | Performed by: INTERNAL MEDICINE

## 2020-11-09 PROCEDURE — 83735 ASSAY OF MAGNESIUM: CPT

## 2020-11-09 PROCEDURE — 36415 COLL VENOUS BLD VENIPUNCTURE: CPT

## 2020-11-09 PROCEDURE — 82550 ASSAY OF CK (CPK): CPT

## 2020-11-09 PROCEDURE — 94799 UNLISTED PULMONARY SVC/PX: CPT

## 2020-11-09 PROCEDURE — 85025 COMPLETE CBC W/AUTO DIFF WBC: CPT

## 2020-11-09 RX ORDER — SODIUM CHLORIDE 9 MG/ML
INJECTION, SOLUTION INTRAVENOUS CONTINUOUS
Status: DISCONTINUED | OUTPATIENT
Start: 2020-11-09 | End: 2020-11-11 | Stop reason: HOSPADM

## 2020-11-09 RX ORDER — HEPARIN SODIUM 5000 [USP'U]/ML
5000 INJECTION, SOLUTION INTRAVENOUS; SUBCUTANEOUS EVERY 12 HOURS
Status: DISCONTINUED | OUTPATIENT
Start: 2020-11-09 | End: 2020-11-11 | Stop reason: HOSPADM

## 2020-11-09 RX ORDER — TALC
6 POWDER (GRAM) TOPICAL NIGHTLY PRN
Status: DISCONTINUED | OUTPATIENT
Start: 2020-11-09 | End: 2020-11-11 | Stop reason: HOSPADM

## 2020-11-09 RX ORDER — IBUPROFEN 200 MG
1 TABLET ORAL DAILY
Status: DISCONTINUED | OUTPATIENT
Start: 2020-11-09 | End: 2020-11-11 | Stop reason: HOSPADM

## 2020-11-09 RX ORDER — IPRATROPIUM BROMIDE AND ALBUTEROL SULFATE 2.5; .5 MG/3ML; MG/3ML
3 SOLUTION RESPIRATORY (INHALATION) EVERY 6 HOURS PRN
Status: DISCONTINUED | OUTPATIENT
Start: 2020-11-09 | End: 2020-11-11 | Stop reason: HOSPADM

## 2020-11-09 RX ADMIN — PIPERACILLIN AND TAZOBACTAM 4.5 G: 4; .5 INJECTION, POWDER, LYOPHILIZED, FOR SOLUTION INTRAVENOUS; PARENTERAL at 01:11

## 2020-11-09 RX ADMIN — Medication: at 10:11

## 2020-11-09 RX ADMIN — SODIUM CHLORIDE: 0.9 INJECTION, SOLUTION INTRAVENOUS at 03:11

## 2020-11-09 RX ADMIN — PIPERACILLIN AND TAZOBACTAM 4.5 G: 4; .5 INJECTION, POWDER, LYOPHILIZED, FOR SOLUTION INTRAVENOUS; PARENTERAL at 06:11

## 2020-11-09 RX ADMIN — TRAZODONE HYDROCHLORIDE 25 MG: 50 TABLET ORAL at 10:11

## 2020-11-09 RX ADMIN — NICOTINE 1 PATCH: 14 PATCH TRANSDERMAL at 03:11

## 2020-11-09 RX ADMIN — HEPARIN SODIUM 5000 UNITS: 5000 INJECTION INTRAVENOUS; SUBCUTANEOUS at 10:11

## 2020-11-09 RX ADMIN — MUPIROCIN: 20 OINTMENT TOPICAL at 08:11

## 2020-11-09 RX ADMIN — PIPERACILLIN AND TAZOBACTAM 4.5 G: 4; .5 INJECTION, POWDER, LYOPHILIZED, FOR SOLUTION INTRAVENOUS; PARENTERAL at 10:11

## 2020-11-09 RX ADMIN — ASPIRIN 81 MG: 81 TABLET, COATED ORAL at 08:11

## 2020-11-09 RX ADMIN — Medication: at 02:11

## 2020-11-09 RX ADMIN — ACETAMINOPHEN 650 MG: 325 TABLET, FILM COATED ORAL at 11:11

## 2020-11-09 RX ADMIN — MUPIROCIN: 20 OINTMENT TOPICAL at 10:11

## 2020-11-09 RX ADMIN — IPRATROPIUM BROMIDE AND ALBUTEROL SULFATE 3 ML: .5; 3 SOLUTION RESPIRATORY (INHALATION) at 01:11

## 2020-11-09 RX ADMIN — METHOCARBAMOL 500 MG: 500 TABLET ORAL at 03:11

## 2020-11-09 RX ADMIN — POTASSIUM PHOSPHATE, MONOBASIC AND POTASSIUM PHOSPHATE, DIBASIC 30 MMOL: 224; 236 INJECTION, SOLUTION, CONCENTRATE INTRAVENOUS at 09:11

## 2020-11-09 NOTE — ASSESSMENT & PLAN NOTE
? Aspiration pna with leukocytosis, lactic acidosis, o2 requirement, subtle change on cxr  Started on zosyn day 2  resp cx   duonebs prn  Improved resp status, on room air

## 2020-11-09 NOTE — ASSESSMENT & PLAN NOTE
Questionable history of fall   Patient with CK level of 12,921  Continue IV fluids with bicarb and monitor CK levels  Improving  Will change to ns ivf  Monitor labs

## 2020-11-09 NOTE — SUBJECTIVE & OBJECTIVE
"  Woke up with symptoms?: yes    Recent bleeding noted: no  Does the patient take any Blood Thinners? no  Medications: No relevant medications      Past Medical History: no relevant history    Past Surgical History: none    Family History: no relevant history    Social History: smoker (active) and illicit drugs heroine    Allergies: No Known Allergies     Review of Systems   Neurological: Positive for weakness and numbness.   All other systems reviewed and are negative.    Objective:   Vitals: Blood pressure 126/89, pulse 96, temperature 98.8 °F (37.1 °C), temperature source Oral, resp. rate 16, height 5' 10" (1.778 m), weight 68.1 kg (150 lb 2.1 oz), SpO2 96 %.     CT READ: Yes  No hemmorhage. No mass effect. No early infarct signs.     Physical Exam  Constitutional:       Appearance: Normal appearance.   HENT:      Head: Normocephalic.   Eyes:      Extraocular Movements: Extraocular movements intact.   Pulmonary:      Effort: Pulmonary effort is normal.   Neurological:      Mental Status: He is alert and oriented to person, place, and time.      Coordination: Coordination abnormal.           "

## 2020-11-09 NOTE — CONSULTS
NEUROLOGY CONSULT NOTE  [facility]    Patient Name:  Garrick White  Date of Consult: 2020  Admit Date:    MR #:  [id number]  Acct #:  [id number]  :  1985    Physicians:  Ilana Nichole NP (Family); Jaclyn Christensen MD  Consulting Physician:  Devika Steinberg MD       Chief Complaint/Reason for Consult:       Assessment and Plan:  Garrick White is a 35 y.o. w/ a h/o heroin abuse, anxiety, depression who presented to the ER ( brought by an unkown friend) for evaluation of right upper extremity weakness and encephalopathy. He was admitted to the ICU initially given lactic acidosis, hyperkalemia, DANYA On examination, the patient has evidence of right upper extremity weakness and numbness. Weakness is proximal > distal. He has sustained clonus at his ankles and 3+ knee reflexes.     Mri brain reveals tiny infracts in the right basal ganglia and left cerebellum which would not explain the right sided weakness. Recommend MRI C spine to further evaluate the same.   MRA head/ neck - wnl. Urine tox screen - presumptive positive for cocaine and opiates. Additionally DANYA and elevated liver enzymes.      Assessment:  1. Bilateral cereberal infarcts- concern for embolism  2. Right upper extremity weakness  3. IV drug abuse   4. Rhabdomyolysis   5. DANYA  6. Abnormal LFts     Recommendations:  1. Maintain permissive HTN for 24-48 hours, do not treat blood pressure unless >220/120, can use IV Labetalol for the same prn  2. 2D Echo with bubble study  3. Telemetry monitoring   4. Would recommend referral to Cardiology as outpatient for holter monitor or linq monitor placement to evaluate for paroxysmal atrial fibrillation  5. PT/OT/ Speech therapy consultation   6. Follow up with Stroke Neurology in 4 weeks   7. MRI C spine without contrast  8. Follow up blood cultures, given possible cardiac embolism  9. If no e/o vegetation on echocardiogram can start patient on ASA 81 mg daily  10. Patient counseled regarding  "cessation of drug abuse and cocaine can cause stroke  11. EMG/NCS of the right upper extremity as an outpatient        History of Present Illness:  Garrick White is a 35 y.o.  male on whom I have been asked to consult for evaluation and treatment of change in mental status. The patient is unable to state how he got to the hospital.    Per chart review-  " This is a 35 y.o. male who presents with complaint of right arm weakness following drug and alcohol consumption and a potential fall. His friend reported that he consumed alcohol and used drugs including heroin, last night and that the pt fell down after waking up this morning. Pt confirms that last night, he "got loaded" and "drank anything", admitting to drug and heroin use, and reports that he "passed out". He was unsure of whether he fell. He denies use of methamphetamines or cocaine. He reports weakness, numbness, and tingling of his right arm. He also reports neck pain on the right side and says it feels "numb." He reports significant hearing loss in both ears upon waking, but states that his vision is normal. He denies a headache or any recent fever or SOB. Pt smokes tobacco, drinks alcohol, and uses illicit drugs including heroin.""    Per patient's mother she saw him last on Friday. She states someone he knows drove him to the ER as his car is parked in the parking lot. She is not sure how he got to the hospital. He did not do drugs for a year but recently he has been depressed since he is getting . She states he has been taking Tylenol for headaches.      Duration: unclear   Location: right upper extremity   Intensity: moderate to severe   Aggravating factors: drug use   Relieving factors: none   Frequency: one time   Timing: unclear   Associated symptoms: none         Laboratory and Additional Data Reviewed:      Tests to date: All imaging reviewed personally by me in addition to cardiology reports.  MRA Neck without contrast   Final Result    "   No significant arterial abnormalities.         Electronically signed by: Jay Duncan MD   Date:    11/09/2020   Time:    07:49      MRA Brain without contrast   Final Result      No significant arterial abnormalities.         Electronically signed by: Jay Duncan MD   Date:    11/09/2020   Time:    07:49      MRI Brain Without Contrast   Final Result   Abnormal      Two small foci of acute infarction involving the posterior limb of the right internal capsule and the left cerebellar hemisphere.  Consider possible embolic disease.      This report was flagged in Epic as abnormal.         Electronically signed by: Keaton Magallanes MD   Date:    11/08/2020   Time:    19:08      CT CERVICAL SPINE WITHOUT CONTRAST   Final Result      1. No acute intracranial findings.   2. No acute cervical spine fracture.         Electronically signed by: Myles Hinton   Date:    11/08/2020   Time:    14:31      CT Head Without Contrast   Final Result      1. No acute intracranial findings.   2. No acute cervical spine fracture.         Electronically signed by: Myles Hinton   Date:    11/08/2020   Time:    14:31      X-Ray Chest AP Portable   Final Result      As above.         Electronically signed by: Kaushal Jacob MD   Date:    11/08/2020   Time:    14:18      MRI Cervical Spine Without Contrast    (Results Pending)       Labs: All labs reviewed by me.  Lab Results   Component Value Date    WBC 17.08 (H) 11/09/2020    HGB 13.7 (L) 11/09/2020    HCT 40.1 11/09/2020     11/09/2020    CHOL 88 (L) 11/09/2020    TRIG 69 11/09/2020    HDL 33 (L) 11/09/2020    LDLCALC 41.2 (L) 11/09/2020    ALT 82 (H) 11/09/2020     (H) 11/09/2020     11/09/2020    K 3.8 11/09/2020    CL 99 11/09/2020    CREATININE 0.9 11/09/2020    BUN 13 11/09/2020         History:   Past Medical History:   Diagnosis Date    Anxiety     Depression     Melanoma      Past Surgical History:   Procedure Laterality Date    EXCISION OF  MELANOMA      back of neck     Family History   Problem Relation Age of Onset    Hyperlipidemia Mother     ADD / ADHD Brother      Social History     Socioeconomic History    Marital status:      Spouse name: Not on file    Number of children: Not on file    Years of education: Not on file    Highest education level: Not on file   Occupational History    Not on file   Social Needs    Financial resource strain: Not on file    Food insecurity     Worry: Not on file     Inability: Not on file    Transportation needs     Medical: Not on file     Non-medical: Not on file   Tobacco Use    Smoking status: Never Smoker    Smokeless tobacco: Never Used   Substance and Sexual Activity    Alcohol use: No    Drug use: Yes     Comment: heroin    Sexual activity: Not on file   Lifestyle    Physical activity     Days per week: Not on file     Minutes per session: Not on file    Stress: Not on file   Relationships    Social connections     Talks on phone: Not on file     Gets together: Not on file     Attends [employer] service: Not on file     Active member of club or organization: Not on file     Attends meetings of clubs or organizations: Not on file     Relationship status: Not on file   Other Topics Concern    Not on file   Social History Narrative    Not on file       Allergy Information:        I have reviewed the patient's allergies.       Patient has no known allergies.    Home Medications:   No current facility-administered medications on file prior to encounter.      Current Outpatient Medications on File Prior to Encounter   Medication Sig Dispense Refill    FLUoxetine 40 MG capsule Take 1 capsule (40 mg total) by mouth once daily. 30 capsule 0    traZODone (DESYREL) 50 MG tablet Take 1 tablet (50 mg total) by mouth every evening. 30 tablet 11       Hospital Medications Reviewed in EPIC   aspirin  81 mg Oral Daily    heparin (porcine)  5,000 Units Subcutaneous Q12H    mupirocin   Nasal  BID    nicotine  1 patch Transdermal Daily    piperacillin-tazobactam (ZOSYN) IVPB  4.5 g Intravenous Q8H       Review of Systems:    Review of Systems   Constitutional: Negative for chills and fever.   HENT: Positive for hearing loss. Negative for tinnitus.    Eyes: Negative for blurred vision and double vision.   Respiratory: Negative for cough and hemoptysis.    Cardiovascular: Negative for chest pain and palpitations.   Gastrointestinal: Negative for heartburn and nausea.   Genitourinary: Negative for dysuria and urgency.   Musculoskeletal: Positive for falls and myalgias.   Skin: Negative for itching and rash.   Neurological: Positive for sensory change, focal weakness and headaches. Negative for dizziness.   Endo/Heme/Allergies: Negative for environmental allergies. Does not bruise/bleed easily.   Psychiatric/Behavioral: Positive for depression and substance abuse.         Physical Examination:  Vital signs in last 24 hours:  Temp:  [97.9 °F (36.6 °C)-99 °F (37.2 °C)] 99 °F (37.2 °C)  Pulse:  [] 91  Resp:  [11-21] 16  SpO2:  [92 %-98 %] 96 %  BP: (107-139)/(56-92) 120/79      GENERAL:  General appearance: Well, non-toxic appearing.  No apparent distress.  Fundi exam: normal.  Neck: supple.  Carotid auscultation: normal.  Heart auscultation: normal.  Peripheral pulses: normal.  Extremities: normal.    MENTAL STATUS:  Alertness, attention span & concentration: normal.  Language: normal.  Orientation to self, place & time:  normal.  Memory, recent & remote: normal.  Fund of knowledge: normal.      SPEECH:  Clear and fluent.  Follows complex commands.    CRANIAL NERVES:  Cranial Nerves II-XII were examined.  II - Visual fields: normal.  III, IV, VI: PERRL, EOMI, No ptosis, No nystagmus.  V - Facial sensation: normal.  VII - Face symmetry & mobility: normal.  VIII - Hearing: normal.  IX, X - Palate: mobile & midline.  XI - Shoulder shrug: normal.  XII - Tongue protrusion: normal.    GROSS MOTOR:  Gait &  station: normal.  Tone: normal.  Abnormal movements: none.  Finger-nose & Heel-knee-shin: normal.  Rapid alternating movements & drift: normal.    MUSCLE STRENGTH:     Fascics Atrophy RIGHT    LEFT Atrophy Fascics     5 Neck Ext. 5       5 Neck Flex 5       1 Deltoids 5       5 Sh.Ext.Rot. 5       5 Sh.Int.Rot. 5       5 Biceps 5       5 Triceps 5       5 Forearm.Pr. 5       5 Wrist Ext. 5       5 Wrist Flex 5       5 Finger Ext. 5       5 Finger Flex 5       3 EPL 5       4 Inteross. 5       4 FDI 5                5 Iliopsoas 5       5 Hip Abduct 5       5 Hip Adduct 5       5 Quads 5       5 Hams 5       5 Dorsiflex 5       5 Plantar Flex 5       5 Ankle Sean 5       5 Ankle Invert 5       5 Toe Ext. 5       5 Toe Flex 5                         REFLEXES:    RIGHT Reflex   LEFT   0 Biceps 1+   0 Brachiorad. 1+   2+ Triceps 2+        3+ Patellar 3+   Sustained clonus Ankle Sustained clonus         Down PLANTAR Down     SENSORY:  Light touch: diminished over   Sharp touch: Normal throughout.  Vibration:   20 seconds at the great toes  Normal throughout  Temperature: Normal throughout.  Joint Position: Intact to low amplitude changes at the great toes     40 minutes spent face-to-face, >50% spent advising about: counseling and/or coordination of care           Devika Steinberg M.D    Medicine-Neurology, Clinical Neurophysiology    This note was created with voice recognition software.  Grammatical, syntax and spelling errors may be inevitable.

## 2020-11-09 NOTE — PT/OT/SLP EVAL
Speech Language Pathology Evaluation  Bedside Swallow    Patient Name:  Garrick White   MRN:  [id number]  Admitting Diagnosis: Non-traumatic rhabdomyolysis    Recommendations:                 General Recommendations:  SLP to follow up to assess diet tolerance and monitor for cognitive communication deficits     Diet recommendations: Solids: Regular, Liquids: Thin     Aspiration Precautions: Standard aspiration precautions     General Precautions: Standard,         History:     HPI:  35-year-old male with history of anxiety and depression, IV heroin use, came in complaining of right arm weakness.  Patient is not able to give much history.  He states I am very uncomfortable and do not feel good.  He is specially uncomfortable  not able to move his right arm.  Patient is not able to answer all the questions.  He did say that he think he overdosed.  He does not remember falling.  He was not able to tell me whether he drinks alcohol or not but admitted to prior physician.  His workup in the ED showed rhabdomyolysis with acute kidney injury and hyperkalemia of 7.0.  He also had elevated lactate peak of 4.0.  Patient was given normal saline and was started on bicarb drip.  His repeat potassium was 5.9.  His EKG did not show acute changes.  CT head and CT cervical spine were okay.  Chest x-ray showed interstitial prominence in the left lower lung.  The patient is being admitted to ICU for further management.     Overview/Hospital Course:  Mri showed Two small foci of acute infarction involving the posterior limb of the right internal capsule and the left cerebellar hemisphere.  Consider possible embolic disease. MRA head and neck were normal.Vega and ck levels improving.     Interval History: does not remember what happened, able to move right arm little better.    Past Medical History:   Diagnosis Date    Anxiety     Depression     Melanoma        Past Surgical History:   Procedure Laterality Date    EXCISION OF MELANOMA       back of neck         Chest X-Rays 11/8/20: No consolidation, pleural effusion or pneumothorax.  Interstitial prominence noted in the left lower lung zone.  Cardiac silhouette is unremarkable.      Subjective     Pt awake, RN at bedside. Pt agreeable to SLP evaluation.     Pain/Comfort:  · Pain Rating 1: 0/10    Objective:     Cognitive Communication Status: Pt awake, alert, cooperative. Oriented to person, place, month, year with 100% accuracy. Required review of exact date. Pt able to sustain attention to SLP with no redirection required. Able to answer simple y/n questions and describe current symptoms in fluent sentences. Pt denies any speech, language, or memory deficits associated with recent event. Pt does state he has little memory of what happened immediately prior to fall. However, he is able to recall events during hospitalization. Pt able to communicate basic wants and needs independently. Will continue to monitor for changes in language and cognitive communication.     Oral Musculature Evaluation  · Oral Musculature: right weakness  · Dentition: present and adequate  · Secretion Management: adequate  · Mucosal Quality: good  · Mandibular Strength and Mobility: WNL  · Voice Prior to PO Intake: clear, normal volume  · Oral Musculature Comments: Face is symmetrical at rest. Mild asymmetry noted during smile with dcr movement of R side. Able to lateralize, elevate, retract tongue and protrusion is at midline. Speech is 100% intelligible at the conversation level.    Bedside Swallow Eval:   Consistencies Assessed:  · Thin liquids single sips and sequential sips via cup and straw  · Puree 1/2tsp bites pudding   · Solids single bites afua cracker      Oral Phase:   · Lip seal, a-p transport, ability to form cohesive bolus is WNL  · Adequate mastication of solids  · No oral residuals noted after the swallow    Pharyngeal Phase:   · Trigger of pharyngeal swallow appears to be WFL   · No overt s/s of airway  threat or aspiration during intake of thin liquids, purees, and solids: no coughing, throat clearing, change in vocal quality        Treatment: Discussed with pt SLP to follow up x1-2 to monitor diet tolerance and for any changes in cognitive communication or language status.     Assessment:     Garrick White is a 35 y.o. male with recent small acute infarcts after fall due to suspected overdoes. Pt appears to have normal oral and pharyngeal swallow function with no s/s of aspiration. Pt with mild facial weakness. No observed or reported cognitive communication or language deficits, however, SLP to follow up x1-2 to monitor.     Goals:   Multidisciplinary Problems     SLP Goals        Problem: SLP Goal    Goal Priority Disciplines Outcome   SLP Goal     SLP Ongoing, Progressing   Description: 1. Pt will be able to consume thin liquids and regular solids with no overt s/s of airway threat or aspiration.                    Plan:     · Patient to be seen:  3 x/week   · Plan of Care expires:  11/16/20  · Plan of Care reviewed with:  patient   · SLP Follow-Up:  Yes       Discharge recommendations:  other (see comments)(TBD)       Time Tracking:     SLP Treatment Date:   11/09/20  Speech Start Time:  1552  Speech Stop Time:  1602     Speech Total Time (min):  10 min    Billable Minutes: Eval Swallow and Oral Function 10 mins    Dora Stahl CCC-SLP  11/09/2020

## 2020-11-09 NOTE — ASSESSMENT & PLAN NOTE
Stroke consideration of Infective endocarditis - risk factors IV drug abuse, multifocal multi-territory stroke, leucocytosis.     Antithrombotics for secondary stroke prevention: Antiplatelets: Aspirin: 81 mg daily    Statins for secondary stroke prevention and hyperlipidemia, if present:   Statins: Atorvastatin- 80 mg daily    Aggressive risk factor modification: Smoking     Rehab efforts: The patient has been evaluated by a stroke team provider and the therapy needs have been fully considered based off the presenting complaints and exam findings. The following therapy evaluations are needed: PT evaluate and treat, OT evaluate and treat, SLP evaluate and treat    Diagnostics ordered/pending: CTA Head to assess vasculature , CTA Neck/Arch to assess vasculature, HgbA1C to assess blood glucose levels, Lipid Profile to assess cholesterol levels, TTE to assess cardiac function/status , Other: Consider DANIEL, blood cultures to r/o infective endocarditis    VTE prophylaxis: Heparin 5000 units SQ every 8 hours    BP parameters: Infarct: No intervention, SBP <220

## 2020-11-09 NOTE — ASSESSMENT & PLAN NOTE
Likely secondary to dehydration and rhabdomyolysis  Improved with IV fluids on repeat BMP  improving

## 2020-11-09 NOTE — HOSPITAL COURSE
Mri showed Two small foci of acute infarction involving the posterior limb of the right internal capsule and the left cerebellar hemisphere.  Consider possible embolic disease. MRA head and neck were normal.Vega and ck levels improving.Echo showed ef 45%, mild global hypokinesis.  He was in sinus rhythm.  He was started on aspirin 81 mg daily.  Neurology was consulted.  PT OT recommended outpatient therapy.  He was discharged on low-dose statin due to recent rhabdo with follow-up of  his CK level as outpatient.  MRI C-spine was normal.  Patient will follow-up with neurology for outpatient nerve conduction studies for his right arm weakness.  Plan of care was discussed with the patient and his mother.  Patient will be staying in [place] with his parents.

## 2020-11-09 NOTE — PROGRESS NOTES
[facility] Hospital Medicine  Progress Note    Patient Name: Garrick White  MRN: [id number]  Patient Class: IP- Inpatient   Admission Date: 11/8/2020  Length of Stay: 1 days  Attending Physician: Jaclyn Christensen MD  Primary Care Provider: Ilana Nichole NP        Subjective:     Principal Problem:Non-traumatic rhabdomyolysis        HPI:  35-year-old male with history of anxiety and depression, IV heroin use, came in complaining of right arm weakness.  Patient is not able to give much history.  He states I am very uncomfortable and do not feel good.  He is specially uncomfortable  not able to move his right arm.  Patient is not able to answer all the questions.  He did say that he think he overdosed.  He does not remember falling.  He was not able to tell me whether he drinks alcohol or not but admitted to prior physician.  His workup in the ED showed rhabdomyolysis with acute kidney injury and hyperkalemia of 7.0.  He also had elevated lactate peak of 4.0.  Patient was given normal saline and was started on bicarb drip.  His repeat potassium was 5.9.  His EKG did not show acute changes.  CT head and CT cervical spine were okay.  Chest x-ray showed interstitial prominence in the left lower lung.  The patient is being admitted to ICU for further management.    Overview/Hospital Course:  Mri showed Two small foci of acute infarction involving the posterior limb of the right internal capsule and the left cerebellar hemisphere.  Consider possible embolic disease. MRA head and neck were normal.Vega and ck levels improving.    Interval History: does not remember what happened, able to move right arm little better.    Review of Systems   Constitutional: Negative for chills and fever.   HENT: Negative for trouble swallowing.    Respiratory: Negative for cough and shortness of breath.    Cardiovascular: Negative for chest pain and leg swelling.   Gastrointestinal: Negative for abdominal pain, blood in stool,  nausea and vomiting.   Genitourinary: Negative for dysuria and hematuria.   Skin: Negative for rash.   Neurological: Positive for weakness. Negative for headaches.   Psychiatric/Behavioral: Negative for confusion.     Objective:     Vital Signs (Most Recent):  Temp: 99 °F (37.2 °C) (11/09/20 1540)  Pulse: 91 (11/09/20 1540)  Resp: 16 (11/09/20 1540)  BP: 120/79 (11/09/20 1540)  SpO2: 96 % (11/09/20 1540) Vital Signs (24h Range):  Temp:  [97.9 °F (36.6 °C)-99 °F (37.2 °C)] 99 °F (37.2 °C)  Pulse:  [] 91  Resp:  [11-21] 16  SpO2:  [92 %-98 %] 96 %  BP: (107-139)/(56-97) 120/79     Weight: 68 kg (150 lb)  Body mass index is 21.52 kg/m².    Intake/Output Summary (Last 24 hours) at 11/9/2020 1542  Last data filed at 11/9/2020 1430  Gross per 24 hour   Intake 4032.5 ml   Output 5225 ml   Net -1192.5 ml      Physical Exam  Vitals signs reviewed.   Constitutional:       General: He is not in acute distress.     Appearance: He is well-developed.   HENT:      Head: Normocephalic and atraumatic.   Eyes:      Extraocular Movements: Extraocular movements intact.      Pupils: Pupils are equal, round, and reactive to light.   Neck:      Musculoskeletal: Normal range of motion and neck supple.   Cardiovascular:      Rate and Rhythm: Normal rate and regular rhythm.   Pulmonary:      Effort: Pulmonary effort is normal. No respiratory distress.      Breath sounds: Normal breath sounds.   Abdominal:      General: Bowel sounds are normal. There is no distension.      Palpations: Abdomen is soft.      Tenderness: There is no abdominal tenderness.   Musculoskeletal:         General: No swelling.      Comments: Right arm weakness, able to raise it up better today, able to squeeze hand   Skin:     General: Skin is warm.      Findings: No rash.   Neurological:      Mental Status: He is alert and oriented to person, place, and time.   Psychiatric:         Behavior: Behavior normal.      Comments: flat         Significant Labs:   BMP:    Recent Labs   Lab 11/09/20  0350   *      K 3.8   CL 99   CO2 27   BUN 13   CREATININE 0.9   CALCIUM 8.2*   MG 1.7     CBC:   Recent Labs   Lab 11/08/20  1333 11/09/20  0350   WBC 19.53* 17.08*   HGB 16.6 13.7*   HCT 50.3 40.1    189       Significant Imaging: I have reviewed all pertinent imaging results/findings within the past 24 hours.      Assessment/Plan:      * Non-traumatic rhabdomyolysis  Questionable history of fall   Patient with CK level of 12,921  Continue IV fluids with bicarb and monitor CK levels  Improving  Will change to ns ivf  Monitor labs      Tobacco use  Nicotine patch      Stroke  Mri showed 2 area of infarct  Follow echo results, blood cx negative  Neuro consult  PT/OT/ST eval  On asa  On started on statin due to rhabdo  ldl 41.2  ? Due to drug use    Elevated LFTs  Likely due to rhabdo   Monitor labs      Right arm weakness    Ct head negative  Mri brain showed stroke  Pt/ot efforts      Polysubstance (excluding opioids) dependence, daily use  Patient with iv drug use  uds positive for cocaine and opiates  Denies alcohol use  Recent hiv, hep panel  Negative 09/20, will repeat   S/p banana bag  Prn ativan for withdrawal  Patient was in rehab for drugs about a year, relapsed , not using daily        Aspiration pneumonia  ? Aspiration pna with leukocytosis, lactic acidosis, o2 requirement, subtle change on cxr  Started on zosyn day 2  resp cx   duonebs prn  Improved resp status, on room air        Lactic acidosis  Could be due to rhabdo/infection   repeat levels improving        DANYA (acute kidney injury)  Likely secondary to dehydration and rhabdomyolysis  Improved with IV fluids on repeat BMP  improving        VTE Risk Mitigation (From admission, onward)         Ordered     IP VTE LOW RISK PATIENT  Once      11/08/20 1656     Place sequential compression device  Until discontinued      11/08/20 1656                Discharge Planning   SAV:      Code Status: Full Code    Is the patient medically ready for discharge?:     Reason for patient still in hospital (select all that apply): Patient trending condition and Treatment                     Jaclyn Christensen MD  Department of Hospital Medicine   [facility]

## 2020-11-09 NOTE — HPI
Found down by friends, LKN unknown. Brought to the hospital and admitted to ICU. Noted to have right sided weakness, sensory loss and difficulty hearing. No observed fall, has a bump on his head. Patient has no recollection of what happened to him. Denies history of seizure disorder.     Past Medical History:   Diagnosis Date    Anxiety     Depression     Melanoma

## 2020-11-09 NOTE — ASSESSMENT & PLAN NOTE
Mri showed 2 area of infarct  Follow echo results, blood cx negative  Neuro consult  PT/OT/ST eval  On asa  On started on statin due to rhabdo  ldl 41.2  ? Due to drug use

## 2020-11-09 NOTE — PLAN OF CARE
Patient on oxygen with documented flow. Will attempt to wean per O2 order protocol.  Patient given aerosol treatment with no adverse reactions noted. Patient instructed on proper use.  Will continue to monitor.

## 2020-11-09 NOTE — PLAN OF CARE
Pt remains free from falls during shift. Awake, alert, and oriented x4. Vital signs stable. On room air. MRI and MRA completed. Hearing loss and RUE weakness improving. No signs of acute distress noted at this time. Bed in lowest position, wheels locked, and bed alarm on. Call light in reach.

## 2020-11-09 NOTE — SUBJECTIVE & OBJECTIVE
Interval History: does not remember what happened, able to move right arm little better.    Review of Systems   Constitutional: Negative for chills and fever.   HENT: Negative for trouble swallowing.    Respiratory: Negative for cough and shortness of breath.    Cardiovascular: Negative for chest pain and leg swelling.   Gastrointestinal: Negative for abdominal pain, blood in stool, nausea and vomiting.   Genitourinary: Negative for dysuria and hematuria.   Skin: Negative for rash.   Neurological: Positive for weakness. Negative for headaches.   Psychiatric/Behavioral: Negative for confusion.     Objective:     Vital Signs (Most Recent):  Temp: 99 °F (37.2 °C) (11/09/20 1540)  Pulse: 91 (11/09/20 1540)  Resp: 16 (11/09/20 1540)  BP: 120/79 (11/09/20 1540)  SpO2: 96 % (11/09/20 1540) Vital Signs (24h Range):  Temp:  [97.9 °F (36.6 °C)-99 °F (37.2 °C)] 99 °F (37.2 °C)  Pulse:  [] 91  Resp:  [11-21] 16  SpO2:  [92 %-98 %] 96 %  BP: (107-139)/(56-97) 120/79     Weight: 68 kg (150 lb)  Body mass index is 21.52 kg/m².    Intake/Output Summary (Last 24 hours) at 11/9/2020 1542  Last data filed at 11/9/2020 1430  Gross per 24 hour   Intake 4032.5 ml   Output 5225 ml   Net -1192.5 ml      Physical Exam  Vitals signs reviewed.   Constitutional:       General: He is not in acute distress.     Appearance: He is well-developed.   HENT:      Head: Normocephalic and atraumatic.   Eyes:      Extraocular Movements: Extraocular movements intact.      Pupils: Pupils are equal, round, and reactive to light.   Neck:      Musculoskeletal: Normal range of motion and neck supple.   Cardiovascular:      Rate and Rhythm: Normal rate and regular rhythm.   Pulmonary:      Effort: Pulmonary effort is normal. No respiratory distress.      Breath sounds: Normal breath sounds.   Abdominal:      General: Bowel sounds are normal. There is no distension.      Palpations: Abdomen is soft.      Tenderness: There is no abdominal tenderness.    Musculoskeletal:         General: No swelling.      Comments: Right arm weakness, able to raise it up better today, able to squeeze hand   Skin:     General: Skin is warm.      Findings: No rash.   Neurological:      Mental Status: He is alert and oriented to person, place, and time.   Psychiatric:         Behavior: Behavior normal.      Comments: flat         Significant Labs:   BMP:   Recent Labs   Lab 11/09/20  0350   *      K 3.8   CL 99   CO2 27   BUN 13   CREATININE 0.9   CALCIUM 8.2*   MG 1.7     CBC:   Recent Labs   Lab 11/08/20  1333 11/09/20  0350   WBC 19.53* 17.08*   HGB 16.6 13.7*   HCT 50.3 40.1    189       Significant Imaging: I have reviewed all pertinent imaging results/findings within the past 24 hours.

## 2020-11-09 NOTE — ASSESSMENT & PLAN NOTE
Patient with iv drug use  uds positive for cocaine and opiates  Denies alcohol use  Recent hiv, hep panel  Negative 09/20, will repeat   S/p banana bag  Prn ativan for withdrawal  Patient was in rehab for drugs about a year, relapsed , not using daily

## 2020-11-09 NOTE — NURSING TRANSFER
Nursing Transfer Note      11/9/2020     Transfer To: 305    Transfer via wheelchair    Transfer with cardiac monitoring    Transported by Transport and RN    Medicines sent: Yes, IV's and IV pump    Chart send with patient: Yes    Notified:Pt mother    Patient reassessed at: 11/9/2020 @ 1445  Upon arrival to floor: patient oriented to room, call bell in reach and bed in lowest position

## 2020-11-09 NOTE — CONSULTS
[facility]  Vascular Neurology  Comprehensive Stroke Center  Tele-Consultation Note      Consults    Consulting Provider: RAMEZ PAREKH  Current Providers  No providers found    Patient Location:  [facility] ICU Emergency Department  Spoke hospital nurse at bedside with patient assisting consultant.     Patient information was obtained from patient.         Assessment/Plan:     STROKE DOCUMENTATION     Acute Stroke Times:   Acute Stroke Times   Last Known Normal Date: (uknown)  Stroke Team Arrival Date: 11/08/20  Stroke Team Arrival Time: 2005  CT Interpretation Time: 2005    NIH Scale:  Interval: baseline  1a. Level of Consciousness: 0-->Alert, keenly responsive  1b. LOC Questions: 0-->Answers both questions correctly  1c. LOC Commands: 0-->Performs both tasks correctly  2. Best Gaze: 0-->Normal  3. Visual: 0-->No visual loss  4. Facial Palsy: 0-->Normal symmetrical movements  5a. Motor Arm, Left: 0-->No drift, limb holds 90 (or 45) degrees for full 10 secs  5b. Motor Arm, Right: 0-->No drift, limb holds 90 (or 45) degrees for full 10 secs  6a. Motor Leg, Left: 0-->No drift, leg holds 30 degree position for full 5 secs  6b. Motor Leg, Right: 0-->No drift, leg holds 30 degree position for full 5 secs  7. Limb Ataxia: 1-->Present in one limb  8. Sensory: 0-->Normal, no sensory loss  9. Best Language: 0-->No aphasia, normal  10. Dysarthria: 1-->Mild-to-moderate dysarthria, patient slurs at least some words and, at worst, can be understood with some difficulty  11. Extinction and Inattention (formerly Neglect): 0-->No abnormality  Total (NIH Stroke Scale): 2     Modified Sabrina Score: 0  [place] Coma Scale:15   ABCD2 Score:    [id number] Score:   HAS -BLED Score:   ICH Score:   Hunt & Renner Classification:       Diagnoses:   Stroke  Stroke consideration of Infective endocarditis - risk factors IV drug abuse, multifocal multi-territory stroke, leucocytosis.     Antithrombotics for  "secondary stroke prevention: Antiplatelets: Aspirin: 81 mg daily    Statins for secondary stroke prevention and hyperlipidemia, if present:   Statins: Atorvastatin- 80 mg daily    Aggressive risk factor modification: Smoking     Rehab efforts: The patient has been evaluated by a stroke team provider and the therapy needs have been fully considered based off the presenting complaints and exam findings. The following therapy evaluations are needed: PT evaluate and treat, OT evaluate and treat, SLP evaluate and treat    Diagnostics ordered/pending: CTA Head to assess vasculature , CTA Neck/Arch to assess vasculature, HgbA1C to assess blood glucose levels, Lipid Profile to assess cholesterol levels, TTE to assess cardiac function/status , Other: Consider DANIEL, blood cultures to r/o infective endocarditis    VTE prophylaxis: Heparin 5000 units SQ every 8 hours    BP parameters: Infarct: No intervention, SBP <220            Blood pressure 126/89, pulse 96, temperature 98.8 °F (37.1 °C), temperature source Oral, resp. rate 16, height 5' 10" (1.778 m), weight 68.1 kg (150 lb 2.1 oz), SpO2 96 %.  Alteplase Eligible?: No  Alteplase Recommendation: Alteplase not recommended due to Outside of treatment window   Possible Interventional Revascularization Candidate? No; No large vessel occlusion    Disposition Recommendation: remains as inpatient at [facility]     Subjective:     History of Present Illness:  Found down by friends, LKN unknown. Brought to the hospital and admitted to ICU. Noted to have right sided weakness, sensory loss and difficulty hearing. No observed fall, has a bump on his head. Patient has no recollection of what happened to him. Denies history of seizure disorder.     Past Medical History:   Diagnosis Date    Anxiety     Depression     Melanoma          Woke up with symptoms?: yes    Recent bleeding noted: no  Does the patient take any Blood Thinners? no  Medications: No relevant medications      Past Medical " "History: no relevant history    Past Surgical History: none    Family History: no relevant history    Social History: smoker (active) and illicit drugs heroine    Allergies: No Known Allergies     Review of Systems   Neurological: Positive for weakness and numbness.   All other systems reviewed and are negative.    Objective:   Vitals: Blood pressure 126/89, pulse 96, temperature 98.8 °F (37.1 °C), temperature source Oral, resp. rate 16, height 5' 10" (1.778 m), weight 68.1 kg (150 lb 2.1 oz), SpO2 96 %.     CT READ: Yes  No hemmorhage. No mass effect. No early infarct signs.     Physical Exam  Constitutional:       Appearance: Normal appearance.   HENT:      Head: Normocephalic.   Eyes:      Extraocular Movements: Extraocular movements intact.   Pulmonary:      Effort: Pulmonary effort is normal.   Neurological:      Mental Status: He is alert and oriented to person, place, and time.      Coordination: Coordination abnormal.               Recommended the emergency room physician to have a brief discussion with the patient and/or family if available regarding the risks and benefits of treatment, and to briefly document the occurrence of that discussion in his clinical encounter note.     The attending portion of this evaluation, treatment, and documentation was performed per Chacorta Musa MD via audiovisual.    Billing code:  ([place] [facility] INPT CONSULT 35MIN)    In your opinion, this was a: Tier 2 Van Negative    Consult End Time: 8:18 PM     Chacorta Musa MD  [facility] St[facility]  Vascular Neurology   [facility]  "

## 2020-11-09 NOTE — PLAN OF CARE
Patient stable. VSS. Fluids infusing. ICU transfer. Rounding completed. Denied needs. Call bell in reach. Side rails up X2. Bed locked, lowered. Safety maintained.

## 2020-11-09 NOTE — PLAN OF CARE
Problem: SLP Goal  Goal: SLP Goal  Description: 1. Pt will be able to consume thin liquids and regular solids with no overt s/s of airway threat or aspiration.   Outcome: Ongoing, Progressing     Bedside swallow evaluation completed this date

## 2020-11-09 NOTE — ASSESSMENT & PLAN NOTE
Likely due to rhabdomyolysis  Improved with IV fluids  Patient also has acidosis on the labs  S/p bicarb drip  resolved

## 2020-11-09 NOTE — PLAN OF CARE
During routine rounds, assessed pt for spiritual distress, and assured pt aware  of spiritual care available.  While providing reflective listening and compassionate presence, pt concerns were explored.  Prayer/wishes for peace and rapid healing were shared.  No distress or particular spiritual care needs were reported nor presented at this time.  Pt reported and presented with relational and maeve support.  Pt reported gratitude for the spiritual wellness check.   Follow-up was offered upon request, and will also be offered at staff's discretion, should pt's conditions change, and/or if hospital admission continues significantly.  Pt may benefit from spiritual care when alone.

## 2020-11-10 PROBLEM — E83.39 HYPOPHOSPHATEMIA: Status: ACTIVE | Noted: 2020-11-10

## 2020-11-10 PROBLEM — F32.A DEPRESSION: Status: ACTIVE | Noted: 2020-11-10

## 2020-11-10 LAB
ALBUMIN SERPL BCP-MCNC: 3.2 G/DL (ref 3.5–5.2)
ALP SERPL-CCNC: 68 U/L (ref 55–135)
ALT SERPL W/O P-5'-P-CCNC: 81 U/L (ref 10–44)
ANION GAP SERPL CALC-SCNC: 7 MMOL/L (ref 8–16)
AST SERPL-CCNC: 160 U/L (ref 10–40)
BASOPHILS # BLD AUTO: 0.01 K/UL (ref 0–0.2)
BASOPHILS NFR BLD: 0.1 % (ref 0–1.9)
BILIRUB SERPL-MCNC: 1.2 MG/DL (ref 0.1–1)
BUN SERPL-MCNC: 8 MG/DL (ref 6–20)
CALCIUM SERPL-MCNC: 8.6 MG/DL (ref 8.7–10.5)
CHLORIDE SERPL-SCNC: 109 MMOL/L (ref 95–110)
CK SERPL-CCNC: 3319 U/L (ref 20–200)
CO2 SERPL-SCNC: 26 MMOL/L (ref 23–29)
CREAT SERPL-MCNC: 0.8 MG/DL (ref 0.5–1.4)
DIFFERENTIAL METHOD: ABNORMAL
EOSINOPHIL # BLD AUTO: 0 K/UL (ref 0–0.5)
EOSINOPHIL NFR BLD: 0 % (ref 0–8)
ERYTHROCYTE [DISTWIDTH] IN BLOOD BY AUTOMATED COUNT: 13.1 % (ref 11.5–14.5)
EST. GFR  (AFRICAN AMERICAN): >60 ML/MIN/1.73 M^2
EST. GFR  (NON AFRICAN AMERICAN): >60 ML/MIN/1.73 M^2
ESTIMATED AVG GLUCOSE: 97 MG/DL (ref 68–131)
GLUCOSE SERPL-MCNC: 85 MG/DL (ref 70–110)
HAV IGM SERPL QL IA: NEGATIVE
HBA1C MFR BLD HPLC: 5 % (ref 4–5.6)
HBV CORE IGM SERPL QL IA: NEGATIVE
HBV SURFACE AG SERPL QL IA: NEGATIVE
HCT VFR BLD AUTO: 39.4 % (ref 40–54)
HCV AB SERPL QL IA: NEGATIVE
HGB BLD-MCNC: 13.4 G/DL (ref 14–18)
IMM GRANULOCYTES # BLD AUTO: 0.09 K/UL (ref 0–0.04)
IMM GRANULOCYTES NFR BLD AUTO: 0.7 % (ref 0–0.5)
LYMPHOCYTES # BLD AUTO: 1.1 K/UL (ref 1–4.8)
LYMPHOCYTES NFR BLD: 8 % (ref 18–48)
MAGNESIUM SERPL-MCNC: 2.2 MG/DL (ref 1.6–2.6)
MCH RBC QN AUTO: 30.1 PG (ref 27–31)
MCHC RBC AUTO-ENTMCNC: 34 G/DL (ref 32–36)
MCV RBC AUTO: 89 FL (ref 82–98)
MONOCYTES # BLD AUTO: 1.3 K/UL (ref 0.3–1)
MONOCYTES NFR BLD: 9.7 % (ref 4–15)
NEUTROPHILS # BLD AUTO: 11.1 K/UL (ref 1.8–7.7)
NEUTROPHILS NFR BLD: 81.5 % (ref 38–73)
NRBC BLD-RTO: 0 /100 WBC
PHOSPHATE SERPL-MCNC: 1.1 MG/DL (ref 2.7–4.5)
PLATELET # BLD AUTO: 186 K/UL (ref 150–350)
PLATELET BLD QL SMEAR: ABNORMAL
PMV BLD AUTO: 10.4 FL (ref 9.2–12.9)
POTASSIUM SERPL-SCNC: 4 MMOL/L (ref 3.5–5.1)
PROT SERPL-MCNC: 6.1 G/DL (ref 6–8.4)
RBC # BLD AUTO: 4.45 M/UL (ref 4.6–6.2)
SODIUM SERPL-SCNC: 142 MMOL/L (ref 136–145)
WBC # BLD AUTO: 13.55 K/UL (ref 3.9–12.7)

## 2020-11-10 PROCEDURE — S4991 NICOTINE PATCH NONLEGEND: HCPCS | Performed by: INTERNAL MEDICINE

## 2020-11-10 PROCEDURE — 63600175 PHARM REV CODE 636 W HCPCS: Performed by: INTERNAL MEDICINE

## 2020-11-10 PROCEDURE — 11000001 HC ACUTE MED/SURG PRIVATE ROOM

## 2020-11-10 PROCEDURE — 80053 COMPREHEN METABOLIC PANEL: CPT

## 2020-11-10 PROCEDURE — 99900035 HC TECH TIME PER 15 MIN (STAT)

## 2020-11-10 PROCEDURE — 97165 OT EVAL LOW COMPLEX 30 MIN: CPT

## 2020-11-10 PROCEDURE — 84100 ASSAY OF PHOSPHORUS: CPT

## 2020-11-10 PROCEDURE — 85025 COMPLETE CBC W/AUTO DIFF WBC: CPT

## 2020-11-10 PROCEDURE — 82550 ASSAY OF CK (CPK): CPT

## 2020-11-10 PROCEDURE — 25000003 PHARM REV CODE 250: Performed by: INTERNAL MEDICINE

## 2020-11-10 PROCEDURE — 94799 UNLISTED PULMONARY SVC/PX: CPT

## 2020-11-10 PROCEDURE — 97162 PT EVAL MOD COMPLEX 30 MIN: CPT

## 2020-11-10 PROCEDURE — 94761 N-INVAS EAR/PLS OXIMETRY MLT: CPT

## 2020-11-10 PROCEDURE — 99233 PR SUBSEQUENT HOSPITAL CARE,LEVL III: ICD-10-PCS | Mod: ,,, | Performed by: INTERNAL MEDICINE

## 2020-11-10 PROCEDURE — 36415 COLL VENOUS BLD VENIPUNCTURE: CPT

## 2020-11-10 PROCEDURE — 83735 ASSAY OF MAGNESIUM: CPT

## 2020-11-10 PROCEDURE — 99233 SBSQ HOSP IP/OBS HIGH 50: CPT | Mod: ,,, | Performed by: INTERNAL MEDICINE

## 2020-11-10 RX ORDER — TRAZODONE HYDROCHLORIDE 50 MG/1
50 TABLET ORAL NIGHTLY
Status: DISCONTINUED | OUTPATIENT
Start: 2020-11-10 | End: 2020-11-11 | Stop reason: HOSPADM

## 2020-11-10 RX ORDER — SODIUM,POTASSIUM PHOSPHATES 280-250MG
1 POWDER IN PACKET (EA) ORAL
Status: DISCONTINUED | OUTPATIENT
Start: 2020-11-10 | End: 2020-11-11 | Stop reason: HOSPADM

## 2020-11-10 RX ORDER — FLUOXETINE HYDROCHLORIDE 20 MG/1
40 CAPSULE ORAL DAILY
Status: DISCONTINUED | OUTPATIENT
Start: 2020-11-10 | End: 2020-11-11 | Stop reason: HOSPADM

## 2020-11-10 RX ADMIN — PIPERACILLIN AND TAZOBACTAM 4.5 G: 4; .5 INJECTION, POWDER, LYOPHILIZED, FOR SOLUTION INTRAVENOUS; PARENTERAL at 06:11

## 2020-11-10 RX ADMIN — ASPIRIN 81 MG: 81 TABLET, COATED ORAL at 10:11

## 2020-11-10 RX ADMIN — POTASSIUM & SODIUM PHOSPHATES POWDER PACK 280-160-250 MG 1 PACKET: 280-160-250 PACK at 05:11

## 2020-11-10 RX ADMIN — POTASSIUM & SODIUM PHOSPHATES POWDER PACK 280-160-250 MG 1 PACKET: 280-160-250 PACK at 09:11

## 2020-11-10 RX ADMIN — HEPARIN SODIUM 5000 UNITS: 5000 INJECTION INTRAVENOUS; SUBCUTANEOUS at 10:11

## 2020-11-10 RX ADMIN — MUPIROCIN: 20 OINTMENT TOPICAL at 09:11

## 2020-11-10 RX ADMIN — MUPIROCIN: 20 OINTMENT TOPICAL at 10:11

## 2020-11-10 RX ADMIN — FLUOXETINE 40 MG: 20 CAPSULE ORAL at 03:11

## 2020-11-10 RX ADMIN — TRAZODONE HYDROCHLORIDE 50 MG: 50 TABLET ORAL at 09:11

## 2020-11-10 RX ADMIN — ACETAMINOPHEN 650 MG: 325 TABLET, FILM COATED ORAL at 06:11

## 2020-11-10 RX ADMIN — PIPERACILLIN AND TAZOBACTAM 4.5 G: 4; .5 INJECTION, POWDER, LYOPHILIZED, FOR SOLUTION INTRAVENOUS; PARENTERAL at 09:11

## 2020-11-10 RX ADMIN — SODIUM CHLORIDE: 0.9 INJECTION, SOLUTION INTRAVENOUS at 11:11

## 2020-11-10 RX ADMIN — POTASSIUM PHOSPHATE, MONOBASIC AND POTASSIUM PHOSPHATE, DIBASIC 30 MMOL: 224; 236 INJECTION, SOLUTION, CONCENTRATE INTRAVENOUS at 10:11

## 2020-11-10 RX ADMIN — HEPARIN SODIUM 5000 UNITS: 5000 INJECTION INTRAVENOUS; SUBCUTANEOUS at 09:11

## 2020-11-10 RX ADMIN — NICOTINE 1 PATCH: 14 PATCH TRANSDERMAL at 10:11

## 2020-11-10 RX ADMIN — PIPERACILLIN AND TAZOBACTAM 4.5 G: 4; .5 INJECTION, POWDER, LYOPHILIZED, FOR SOLUTION INTRAVENOUS; PARENTERAL at 03:11

## 2020-11-10 RX ADMIN — SODIUM CHLORIDE: 0.9 INJECTION, SOLUTION INTRAVENOUS at 09:11

## 2020-11-10 RX ADMIN — ACETAMINOPHEN 650 MG: 325 TABLET, FILM COATED ORAL at 11:11

## 2020-11-10 NOTE — PLAN OF CARE
Problem: Occupational Therapy Goal  Goal: Occupational Therapy Goal  Description: Goals to be met by: 11/15/2020     Patient will increase functional independence with ADLs by performing:    UE Dressing with Set-up Assistance, incorporating (R) UE.  LE Dressing with Stand-by Assistance, incorporating (R) UE.  Grooming while standing at sink with Stand-by Assistance, incorporating (R) UE.  Increased functional strength to WFL for ADL task performance.    Outcome: Ongoing, Progressing  Initial OT evaluation completed, with treatment to follow.  Patient with h/o (R) UE impaired ROM after cervical sx and residual muscle wasting in chest/pect area evident (patient states this is not new).  However, numbness in area of (R) pect is new as well as renewed decreased AROM of (R) shoulder.  Rotator cuff muscles appear lax, concerning for possible future subluxation.  Educated to support (R) UE at elbow with all mobility.  Will notify MD.  May benefit from ortho consult.  Continue OT services to restore patient functioning.

## 2020-11-10 NOTE — ASSESSMENT & PLAN NOTE
? Aspiration pna with leukocytosis, lactic acidosis, o2 requirement, subtle change on cxr  Started on zosyn day 3  resp cx normal   duonebs prn  Improved resp status, on room air  Repeat cxr  Improving wbc

## 2020-11-10 NOTE — ASSESSMENT & PLAN NOTE
Ct head negative  Mri brain showed stroke but does not look like reason for arm weakness  Pt/ot efforts  Mri c spine pending

## 2020-11-10 NOTE — ASSESSMENT & PLAN NOTE
Questionable history of fall   Patient with CK level of 12,921  Continue IV fluids with bicarb and monitor CK levels  Improving  Changed to  ns ivf  Monitor labs

## 2020-11-10 NOTE — ASSESSMENT & PLAN NOTE
Patient with iv drug use  uds positive for cocaine and opiates  Denies alcohol use  Recent hiv, hep panel  Negative 09/20, repeat negative  S/p banana bag  Prn ativan for withdrawal  Patient was in rehab for drugs about a year, relapsed , not using daily

## 2020-11-10 NOTE — PT/OT/SLP EVAL
"Occupational Therapy   Evaluation    Name: Garrick White  MRN: [id number]  Admitting Diagnosis:  Non-traumatic rhabdomyolysis     MRI of 11/08/2020: Two small foci of acute infarction involving the posterior limb of the right internal capsule and the left cerebellar hemisphere.  Consider possible embolic disease.    Recommendations:     Discharge Recommendations: outpatient PT, outpatient OT(OP PT or OT for (R) shoulder)  Discharge Equipment Recommendations:  none  Barriers to discharge:  None    Assessment:     Garrick White is a 35 y.o. male with a medical diagnosis of Non-traumatic rhabdomyolysis.  He presents with the following performance deficits affecting function: weakness, decreased upper extremity function, decreased ROM, impaired sensation, decreased safety awareness, impaired self care skills, abnormal tone.      Initial OT evaluation completed, with treatment to follow.  Patient with h/o (R) UE impaired ROM after cervical sx and residual muscle wasting in chest/pect area evident (patient states this is not new).  However, numbness in area of (R) pect is new as well as renewed decreased AROM of (R) shoulder.  Rotator cuff muscles appear lax, concerning for possible future subluxation.  Educated to support (R) UE at elbow with all mobility.  Will notify MD.  May benefit from ortho consult.  Continue OT services to restore patient functioning.    Rehab Prognosis: Good; patient would benefit from acute skilled OT services to address these deficits and reach maximum level of function.       Plan:     Patient to be seen 3 x/week to address the above listed problems via self-care/home management, therapeutic activities, therapeutic exercises  · Plan of Care Expires: 12/08/20  · Plan of Care Reviewed with: patient, mother    Subjective     Chief Complaint: (R) shoulder disuse  Patient/Family Comments/goals: "It's numb here" (patient indicating (R) pec area)    Occupational Profile:  Living Environment: Patient " resides in a one story home with no steps to enter.  There is a tub/shower combination.  Previous level of function: Independent  Roles and Routines: Son; drives, works FT installing AC units  Equipment Used at Home:  none  Assistance upon Discharge: Family    Pain/Comfort:  · Pain Rating 1: 5/10  · Location - Side 1: Right  · Location - Orientation 1: generalized  · Location 1: chest(taking breaths)  · Pain Addressed 1: Reposition, Distraction  · Pain Rating Post-Intervention 1: 5/10    Patients cultural, spiritual, Sikh conflicts given the current situation: no    Objective:     Communicated with: VAENSSA Perez prior to session.  Patient found HOB elevated with peripheral IV upon OT entry to room.    General Precautions: Standard, anti-coagulation medicine, aspiration, fall   Orthopedic Precautions:N/A   Braces: N/A     Occupational Performance:    Bed Mobility:    · Patient completed Scooting/Bridging with supervision  · Patient completed Supine to Sit with supervision  · Patient completed Sit to Supine with supervision    Functional Mobility/Transfers:  · Patient completed Sit <> Stand Transfer with supervision  with  no assistive device   · Patient completed Toilet Transfer Step Transfer technique with supervision with  no AD  · Functional Mobility: Supervision within room, no AD    Activities of Daily Living:  · Upper Body Dressing: minimum assistance   · Lower Body Dressing: minimum assistance non skid socks at EOB to initiate donning    Cognitive/Visual Perceptual:  Cognitive/Psychosocial Skills:     -       Oriented to: Person, Place and Situation   -       Follows Commands/attention:Follows two-step commands  -       Communication: clear/fluent  -       Memory: impaired night of incident  -       Safety awareness/insight to disability: intact   -       Mood/Affect/Coping skills/emotional control: Appropriate to situation, Despondent and Pleasant  Visual/Perceptual:      -No overt deficits noted      Physical Exam:  Postural examination/scapula alignment:    -       No overt issues; h/o skin CA removal cervical area  Skin integrity: Visible skin intact  Edema:  None noted  Sensation:    -       Impaired  light/touch (R) pec/upper chest  Dominant hand:    -       (R)  Upper Extremity Range of Motion:     -       Right Upper Extremity: WFL except shoulder unable to achieve mid range  -       Left Upper Extremity: WFL  Upper Extremity Strength:    -       Right Upper Extremity: WFL except shoulder  -       Left Upper Extremity: WFL   Strength:    -       Right Upper Extremity: WFL  -       Left Upper Extremity: WFL  Fine Motor Coordination:    -       Intact  Gross motor coordination:   WFL    AMPAC 6 Click ADL:  AMPAC Total Score: 18    Treatment & Education:  Educated on role of OT, POC, supporting (R) UE with pillow in bed and at (R) elbow with (L) UE with all mobility.  Patient verbalized and demonstrated understanding.  Education:    Patient left HOB elevated with all lines intact, call button in reach, RN notified and mother present    GOALS:   Multidisciplinary Problems     Occupational Therapy Goals        Problem: Occupational Therapy Goal    Goal Priority Disciplines Outcome Interventions   Occupational Therapy Goal     OT, PT/OT Ongoing, Progressing    Description: Goals to be met by: 11/15/2020     Patient will increase functional independence with ADLs by performing:    UE Dressing with Set-up Assistance, incorporating (R) UE.  LE Dressing with Stand-by Assistance, incorporating (R) UE.  Grooming while standing at sink with Stand-by Assistance, incorporating (R) UE.  Increased functional strength to WFL for ADL task performance.                     History:     Past Medical History:   Diagnosis Date    Anxiety     Depression     Melanoma        Past Surgical History:   Procedure Laterality Date    EXCISION OF MELANOMA      back of neck       Time Tracking:     OT Date of Treatment:  11/10/20  OT Start Time: 1051  OT Stop Time: 1111  OT Total Time (min): 20 min    Billable Minutes:Evaluation 20    VELASQUEZ Munson  11/10/2020

## 2020-11-10 NOTE — ASSESSMENT & PLAN NOTE
Mri showed 2 area of infarct  Follow echo results, blood cx negative  Neuro consult  PT/OT/ST eval  On asa  Did not  start on statin due to rhabdo  ldl 41.2  ? Due to drug use

## 2020-11-10 NOTE — PLAN OF CARE
Patient stable. VSS. Fluids infusing. Worked with PT/OT. MRI sending for soon. Rounding completed. Denied needs. Call bell in reach. Side rails up X2. Bed locked, lowered. Safety maintained.

## 2020-11-10 NOTE — PLAN OF CARE
VSS, complaints of headache relieved with tylenol. Trazodone x1 dose ordered per Madonna with moderate relief. IV fluids infusing per order. Pt ambulates independently. Urinal at bedside. Free of falls, call light and personal items within reach. Will continue to monitor.     Problem: Fall Injury Risk  Goal: Absence of Fall and Fall-Related Injury  Intervention: Identify and Manage Contributors to Fall Injury Risk  Flowsheets (Taken 11/10/2020 0747)  Self-Care Promotion:   independence encouraged   BADL personal objects within reach  Medication Review/Management: medications reviewed     Problem: Adult Inpatient Plan of Care  Goal: Plan of Care Review  Outcome: Ongoing, Progressing  Flowsheets (Taken 11/10/2020 0747)  Plan of Care Reviewed With: patient

## 2020-11-10 NOTE — PLAN OF CARE
Problem: Physical Therapy Goal  Goal: Physical Therapy Goal  Outcome: Met    PT orders received. Evaluation completed. Pt is Independent for ambulation and transfers. Pt demonstrates normal gait pattern and normal balance. Pt expressed confidence in all aspects of his mobility. No acute PT needs identified at this time. Will d/c PT. Recommend discharge to outpatient PT/OT for R UE weakness.

## 2020-11-10 NOTE — PT/OT/SLP EVAL
"Physical Therapy Evaluation and Discharge Note    Patient Name:  Garrick White   MRN:  [id number]    Recommendations:     Discharge Recommendations:  outpatient PT, outpatient OT   Discharge Equipment Recommendations: none   Barriers to discharge: None    Assessment:     Garrick White is a 35 y.o. male admitted with a medical diagnosis of Non-traumatic rhabdomyolysis. Pt presents with right arm weakness following drug and alcohol consumption and a potential fall. PMH includes anxiety, depression, and IV heroin use. At this time, patient is functioning at their prior level of function and does not require further acute PT services.     PT orders received. Evaluation completed. Pt is Independent for ambulation and transfers. Pt demonstrates normal gait pattern and normal balance. Pt expressed confidence in all aspects of his mobility. No acute PT needs identified at this time. Will d/c PT. Recommend discharge to outpatient PT/OT for R UE weakness.    Recent Surgery: * No surgery found *      Plan:     During this hospitalization, patient does not require further acute PT services.  Please re-consult if situation changes.      Subjective     Chief Complaint: Pt reports he is not in any pain, but is "just beat up" after his suspected fall.   Patient/Family Comments/goals: Pt gestures to his deltoid area and reports he does not have "feeling" in that area.   Pain/Comfort:  · Pain Rating 1: 0/10    Patients cultural, spiritual, Roman Catholic conflicts given the current situation: no    Living Environment:  · Pt reports he lives in a single story house w/ no steps to enter. Pt has a tub/shower.   · DME owned: None  · Upon discharge, patient will have occasional assistance. Pt did not specify who would be providing the assistance.   Prior level of function:  · Ambulation: Independent  · ADL's: Independent  · IADLs: Independent  · Falls: Pt denies history of falls. Pt had suspected fall on 11/7 following drug and alcohol use. " "      Objective:     Communicated with RN (Ana) prior to session.  Patient found supine with peripheral IV(Avasys) upon PT entry to room.    General Precautions: Standard, fall, anti-coagulation medicine   Orthopedic Precautions:N/A   Braces: N/A     Exams:  · Cognition:   · Patient is oriented to person, place, time, and situation.  · Pt follows approximately 100% of multiple-step commands.    · Mood: Somewhat flat and cooperative.  · Musculoskeletal:  · Posture:  Pt's R shoulder observed to be lower than his L shoulder in standing.   · Pt observed to hold R UE in guarded position.  · LE ROM/Strength:   · R ROM: No deficits  · L ROM: No deficits  · R Strength:   · Hip flexion: 5/5  · Knee extension: 4+/5  · Dorsiflexion: 5/5   · L Strength:   · Hip flexion: 5/5  · Knee extension: 4+/5  · Dorsiflexion: 5/5   · Neuromuscular:  · Sensation: Intact to light touch bilateral LEs.   · Tone/Reflexes: No impairments identified with functional mobility. No formal testing performed.  · Coordination:  · Heel to shin: WNL  · Toe tapping: WNL  · Balance:   · Static sitting: Independent  · Dynamic sitting: Independent  · Static standing: Independent  · Dynamic standing: Independent  · Visual-vestibular: No impairments identified with functional mobility. No formal testing performed.  · Integument: Visible skin intact      Functional Mobility: Non-slip socks donned prior to mobility. Surgical mask donned for ambulation in hallway per current infection control policy.  Bed Mobility:     Supine to Sit: independence  Sit to Supine: independence  Transfers:     Sit to Stand:  independence with no AD  Toilet Transfer: independence with  no AD  using  Step Transfer   + void  Gait: Pt ambulated 150ft w/ [place] and no AD.   Pt reports he feels "a little off" due to R UE weakness.   Pt demonstrated supination of bilateral feet.   Tinetti Total Score: 24  < 18 = High Risk of Falls, 19-23 = Moderate Risk of Falls, > 24 = Low Risk " of Falls    AM-PAC 6 CLICK MOBILITY  Total Score:24       Therapeutic Activities and Exercises:   PT educated patient re:   · PT plan of care/role of PT  · Fall and safety precautions  · Use of call light (don't get up without assistance)  Pt verbalized understanding     The patient is safe to transfer with RN assist.      AM-PAC 6 CLICK MOBILITY  Total Score:24     Patient left supine with all lines intact and call button in reach. RN notified.    GOALS:   Multidisciplinary Problems     Physical Therapy Goals     Not on file          Multidisciplinary Problems (Resolved)        Problem: Physical Therapy Goal    Goal Priority Disciplines Outcome Goal Variances Interventions   Physical Therapy Goal   (Resolved)     PT, PT/OT Met                     History:     Past Medical History:   Diagnosis Date    Anxiety     Depression     Melanoma        Past Surgical History:   Procedure Laterality Date    EXCISION OF MELANOMA      back of neck       Time Tracking:     PT Received On: 11/10/20  PT Start Time: 1032     PT Stop Time: 1048  PT Total Time (min): 16 min     Billable Minutes: Evaluation 16      PHYLLIS Lino  11/10/2020

## 2020-11-10 NOTE — PROGRESS NOTES
[facility] Hospital Medicine  Progress Note    Patient Name: Garrick White  MRN: [id number]  Patient Class: IP- Inpatient   Admission Date: 11/8/2020  Length of Stay: 2 days  Attending Physician: Jaclyn Christensen MD  Primary Care Provider: Ilana Nichole NP        Subjective:     Principal Problem:Non-traumatic rhabdomyolysis        HPI:  35-year-old male with history of anxiety and depression, IV heroin use, came in complaining of right arm weakness.  Patient is not able to give much history.  He states I am very uncomfortable and do not feel good.  He is specially uncomfortable  not able to move his right arm.  Patient is not able to answer all the questions.  He did say that he think he overdosed.  He does not remember falling.  He was not able to tell me whether he drinks alcohol or not but admitted to prior physician.  His workup in the ED showed rhabdomyolysis with acute kidney injury and hyperkalemia of 7.0.  He also had elevated lactate peak of 4.0.  Patient was given normal saline and was started on bicarb drip.  His repeat potassium was 5.9.  His EKG did not show acute changes.  CT head and CT cervical spine were okay.  Chest x-ray showed interstitial prominence in the left lower lung.  The patient is being admitted to ICU for further management.    Overview/Hospital Course:  Mri showed Two small foci of acute infarction involving the posterior limb of the right internal capsule and the left cerebellar hemisphere.  Consider possible embolic disease. MRA head and neck were normal.Vega and ck levels improving.Echo showed ef 45%, mild global hypokinesis.    Interval History: does not remember what happened, able to move right arm little better, c/o left sided chest pain on inspiration, no sob with ambulation    Review of Systems   Constitutional: Negative for chills and fever.   HENT: Negative for trouble swallowing.    Respiratory: Negative for cough and shortness of breath.    Cardiovascular:  Negative for chest pain and leg swelling.   Gastrointestinal: Negative for abdominal pain, blood in stool, nausea and vomiting.   Genitourinary: Negative for dysuria and hematuria.   Skin: Negative for rash.   Neurological: Positive for weakness. Negative for headaches.   Psychiatric/Behavioral: Negative for confusion.     Objective:     Vital Signs (Most Recent):  Temp: 99.7 °F (37.6 °C) (11/10/20 1119)  Pulse: 90 (11/10/20 1119)  Resp: 16 (11/10/20 1119)  BP: 125/83 (11/10/20 1119)  SpO2: 98 % (11/10/20 1119) Vital Signs (24h Range):  Temp:  [98 °F (36.7 °C)-99.7 °F (37.6 °C)] 99.7 °F (37.6 °C)  Pulse:  [] 90  Resp:  [16-20] 16  SpO2:  [95 %-98 %] 98 %  BP: (120-139)/(70-86) 125/83     Weight: 68 kg (150 lb)  Body mass index is 21.52 kg/m².    Intake/Output Summary (Last 24 hours) at 11/10/2020 1455  Last data filed at 11/10/2020 1152  Gross per 24 hour   Intake 2112 ml   Output 1825 ml   Net 287 ml      Physical Exam  Vitals signs reviewed.   Constitutional:       General: He is not in acute distress.     Appearance: He is well-developed.   HENT:      Head: Normocephalic and atraumatic.   Eyes:      Extraocular Movements: Extraocular movements intact.      Pupils: Pupils are equal, round, and reactive to light.   Neck:      Musculoskeletal: Normal range of motion and neck supple.   Cardiovascular:      Rate and Rhythm: Normal rate and regular rhythm.   Pulmonary:      Effort: Pulmonary effort is normal. No respiratory distress.      Breath sounds: Normal breath sounds.   Abdominal:      General: Bowel sounds are normal. There is no distension.      Palpations: Abdomen is soft.      Tenderness: There is no abdominal tenderness.   Musculoskeletal:         General: No swelling.      Comments: Right arm weakness, able to control it better at elbow, able to squeeze hand, still cannot raise above head   Skin:     General: Skin is warm.      Findings: No rash.   Neurological:      Mental Status: He is alert and  oriented to person, place, and time.   Psychiatric:         Behavior: Behavior normal.      Comments: flat         Significant Labs:   BMP:   Recent Labs   Lab 11/10/20  0521   GLU 85      K 4.0      CO2 26   BUN 8   CREATININE 0.8   CALCIUM 8.6*   MG 2.2     CBC:   Recent Labs   Lab 11/09/20  0350 11/10/20  0521   WBC 17.08* 13.55*   HGB 13.7* 13.4*   HCT 40.1 39.4*    186       Significant Imaging: I have reviewed all pertinent imaging results/findings within the past 24 hours.      Assessment/Plan:      * Non-traumatic rhabdomyolysis  Questionable history of fall   Patient with CK level of 12,921  Continue IV fluids with bicarb and monitor CK levels  Improving  Changed to  ns ivf  Monitor labs      Depression  Restart home medication of Prozac and trazodone      Hypophosphatemia  Replace IV and oral      Tobacco use  Nicotine patch      Stroke  Mri showed 2 area of infarct  Follow echo results, blood cx negative  Neuro consult  PT/OT/ST eval  On asa  Did not  start on statin due to rhabdo  ldl 41.2  ? Due to drug use    Elevated LFTs  Likely due to rhabdo   Monitor labs  Stable  Hep panel negative    Right arm weakness  Ct head negative  Mri brain showed stroke but does not look like reason for arm weakness  Pt/ot efforts  Mri c spine pending      Polysubstance (excluding opioids) dependence, daily use  Patient with iv drug use  uds positive for cocaine and opiates  Denies alcohol use  Recent hiv, hep panel  Negative 09/20, repeat negative  S/p banana bag  Prn ativan for withdrawal  Patient was in rehab for drugs about a year, relapsed , not using daily        Aspiration pneumonia  ? Aspiration pna with leukocytosis, lactic acidosis, o2 requirement, subtle change on cxr  Started on zosyn day 3  resp cx normal   duonebs prn  Improved resp status, on room air  Repeat cxr  Improving wbc      Lactic acidosis  Could be due to rhabdo/infection   repeat levels improving        DANYA (acute kidney  injury)  Likely secondary to dehydration and rhabdomyolysis  Improved with IV fluids on repeat BMP  improving        VTE Risk Mitigation (From admission, onward)         Ordered     heparin (porcine) injection 5,000 Units  Every 12 hours      11/09/20 1619     IP VTE LOW RISK PATIENT  Once      11/08/20 1656     Place sequential compression device  Until discontinued      11/08/20 1656                Discharge Planning   SAV:      Code Status: Full Code   Is the patient medically ready for discharge?:     Reason for patient still in hospital (select all that apply): Patient trending condition and Treatment  Discharge Plan A: Rehab                  Jaclyn Christensen MD  Department of Hospital Medicine   [facility]

## 2020-11-10 NOTE — SUBJECTIVE & OBJECTIVE
Interval History: does not remember what happened, able to move right arm little better, c/o left sided chest pain on inspiration, no sob with ambulation    Review of Systems   Constitutional: Negative for chills and fever.   HENT: Negative for trouble swallowing.    Respiratory: Negative for cough and shortness of breath.    Cardiovascular: Negative for chest pain and leg swelling.   Gastrointestinal: Negative for abdominal pain, blood in stool, nausea and vomiting.   Genitourinary: Negative for dysuria and hematuria.   Skin: Negative for rash.   Neurological: Positive for weakness. Negative for headaches.   Psychiatric/Behavioral: Negative for confusion.     Objective:     Vital Signs (Most Recent):  Temp: 99.7 °F (37.6 °C) (11/10/20 1119)  Pulse: 90 (11/10/20 1119)  Resp: 16 (11/10/20 1119)  BP: 125/83 (11/10/20 1119)  SpO2: 98 % (11/10/20 1119) Vital Signs (24h Range):  Temp:  [98 °F (36.7 °C)-99.7 °F (37.6 °C)] 99.7 °F (37.6 °C)  Pulse:  [] 90  Resp:  [16-20] 16  SpO2:  [95 %-98 %] 98 %  BP: (120-139)/(70-86) 125/83     Weight: 68 kg (150 lb)  Body mass index is 21.52 kg/m².    Intake/Output Summary (Last 24 hours) at 11/10/2020 1455  Last data filed at 11/10/2020 1152  Gross per 24 hour   Intake 2112 ml   Output 1825 ml   Net 287 ml      Physical Exam  Vitals signs reviewed.   Constitutional:       General: He is not in acute distress.     Appearance: He is well-developed.   HENT:      Head: Normocephalic and atraumatic.   Eyes:      Extraocular Movements: Extraocular movements intact.      Pupils: Pupils are equal, round, and reactive to light.   Neck:      Musculoskeletal: Normal range of motion and neck supple.   Cardiovascular:      Rate and Rhythm: Normal rate and regular rhythm.   Pulmonary:      Effort: Pulmonary effort is normal. No respiratory distress.      Breath sounds: Normal breath sounds.   Abdominal:      General: Bowel sounds are normal. There is no distension.      Palpations: Abdomen  is soft.      Tenderness: There is no abdominal tenderness.   Musculoskeletal:         General: No swelling.      Comments: Right arm weakness, able to control it better at elbow, able to squeeze hand, still cannot raise above head   Skin:     General: Skin is warm.      Findings: No rash.   Neurological:      Mental Status: He is alert and oriented to person, place, and time.   Psychiatric:         Behavior: Behavior normal.      Comments: flat         Significant Labs:   BMP:   Recent Labs   Lab 11/10/20  0521   GLU 85      K 4.0      CO2 26   BUN 8   CREATININE 0.8   CALCIUM 8.6*   MG 2.2     CBC:   Recent Labs   Lab 11/09/20  0350 11/10/20  0521   WBC 17.08* 13.55*   HGB 13.7* 13.4*   HCT 40.1 39.4*    186       Significant Imaging: I have reviewed all pertinent imaging results/findings within the past 24 hours.

## 2020-11-10 NOTE — PLAN OF CARE
Initial Discharge Planning Assessment:  Patient admitted on: 11/8/20     Chart reviewed, Care plan discussed with treatment team,  attending Dr. Christensen     PCP updated in Epic: Dr. Ilana Nichole  Pharmacy, updated in Epic: [facility] in [place]     DME at home: none      Current dispo: Awaiting PT/OT recommendations after stroke, pt likely to need IRF vs outpatient PT/OT (medicaid)      Transportation: family can drive home     Power of  or Living Will: none     Anticipated DC needs from CM perspective:  awaiting therapy recs to determine DC dispo       11/09/20 5978   Discharge Assessment   Assessment Type Discharge Planning Assessment   Confirmed/corrected address and phone number on facesheet? Yes   Assessment information obtained from? Patient   Communicated expected length of stay with patient/caregiver yes   Prior to hospitilization cognitive status: Alert/Oriented   Prior to hospitalization functional status: Independent   Current cognitive status: Alert/Oriented   Current Functional Status: Needs Assistance   Lives With parent(s)   Able to Return to Prior Arrangements yes   Is patient able to care for self after discharge? Unable to determine at this time (comments)   Who are your caregiver(s) and their phone number(s)? mom:  Yane Brito [phone]   Patient's perception of discharge disposition rehab facility   Readmission Within the Last 30 Days no previous admission in last 30 days   Patient currently being followed by outpatient case management? No   Patient currently receives any other outside agency services? No   Equipment Currently Used at Home none   Do you have any problems affording any of your prescribed medications? No   Is the patient taking medications as prescribed? yes   Does the patient have transportation home? Yes   Transportation Anticipated family or friend will provide   Does the patient receive services at the [facility]? No   Discharge Plan A Rehab   Discharge Plan B  Other  (outpatient PT/OT)   Patient/Family in Agreement with Plan yes

## 2020-11-11 VITALS
HEIGHT: 70 IN | WEIGHT: 150 LBS | HEART RATE: 82 BPM | TEMPERATURE: 98 F | BODY MASS INDEX: 21.47 KG/M2 | OXYGEN SATURATION: 97 % | SYSTOLIC BLOOD PRESSURE: 125 MMHG | DIASTOLIC BLOOD PRESSURE: 86 MMHG | RESPIRATION RATE: 15 BRPM

## 2020-11-11 LAB
ALBUMIN SERPL BCP-MCNC: 3.1 G/DL (ref 3.5–5.2)
ALP SERPL-CCNC: 60 U/L (ref 55–135)
ALT SERPL W/O P-5'-P-CCNC: 72 U/L (ref 10–44)
ANION GAP SERPL CALC-SCNC: 10 MMOL/L (ref 8–16)
AST SERPL-CCNC: 104 U/L (ref 10–40)
BACTERIA SPEC AEROBE CULT: NORMAL
BACTERIA SPEC AEROBE CULT: NORMAL
BASOPHILS # BLD AUTO: 0.02 K/UL (ref 0–0.2)
BASOPHILS NFR BLD: 0.2 % (ref 0–1.9)
BILIRUB SERPL-MCNC: 1.5 MG/DL (ref 0.1–1)
BUN SERPL-MCNC: 8 MG/DL (ref 6–20)
CALCIUM SERPL-MCNC: 8.8 MG/DL (ref 8.7–10.5)
CHLORIDE SERPL-SCNC: 111 MMOL/L (ref 95–110)
CK SERPL-CCNC: 2084 U/L (ref 20–200)
CO2 SERPL-SCNC: 22 MMOL/L (ref 23–29)
CREAT SERPL-MCNC: 0.8 MG/DL (ref 0.5–1.4)
DIFFERENTIAL METHOD: ABNORMAL
EOSINOPHIL # BLD AUTO: 0 K/UL (ref 0–0.5)
EOSINOPHIL NFR BLD: 0.1 % (ref 0–8)
ERYTHROCYTE [DISTWIDTH] IN BLOOD BY AUTOMATED COUNT: 13.2 % (ref 11.5–14.5)
EST. GFR  (AFRICAN AMERICAN): >60 ML/MIN/1.73 M^2
EST. GFR  (NON AFRICAN AMERICAN): >60 ML/MIN/1.73 M^2
GLUCOSE SERPL-MCNC: 92 MG/DL (ref 70–110)
GRAM STN SPEC: NORMAL
HCT VFR BLD AUTO: 40.3 % (ref 40–54)
HGB BLD-MCNC: 13.6 G/DL (ref 14–18)
IMM GRANULOCYTES # BLD AUTO: 0.04 K/UL (ref 0–0.04)
IMM GRANULOCYTES NFR BLD AUTO: 0.3 % (ref 0–0.5)
LYMPHOCYTES # BLD AUTO: 1.1 K/UL (ref 1–4.8)
LYMPHOCYTES NFR BLD: 9.1 % (ref 18–48)
MAGNESIUM SERPL-MCNC: 1.8 MG/DL (ref 1.6–2.6)
MCH RBC QN AUTO: 29.8 PG (ref 27–31)
MCHC RBC AUTO-ENTMCNC: 33.7 G/DL (ref 32–36)
MCV RBC AUTO: 88 FL (ref 82–98)
MONOCYTES # BLD AUTO: 1 K/UL (ref 0.3–1)
MONOCYTES NFR BLD: 8.8 % (ref 4–15)
NEUTROPHILS # BLD AUTO: 9.5 K/UL (ref 1.8–7.7)
NEUTROPHILS NFR BLD: 81.5 % (ref 38–73)
NRBC BLD-RTO: 0 /100 WBC
PHOSPHATE SERPL-MCNC: 1.9 MG/DL (ref 2.7–4.5)
PLATELET # BLD AUTO: 212 K/UL (ref 150–350)
PMV BLD AUTO: 9.4 FL (ref 9.2–12.9)
POTASSIUM SERPL-SCNC: 3.8 MMOL/L (ref 3.5–5.1)
PROT SERPL-MCNC: 6.2 G/DL (ref 6–8.4)
RBC # BLD AUTO: 4.57 M/UL (ref 4.6–6.2)
SODIUM SERPL-SCNC: 143 MMOL/L (ref 136–145)
WBC # BLD AUTO: 11.6 K/UL (ref 3.9–12.7)

## 2020-11-11 PROCEDURE — 82550 ASSAY OF CK (CPK): CPT

## 2020-11-11 PROCEDURE — 93010 EKG 12-LEAD: ICD-10-PCS | Mod: ,,, | Performed by: INTERNAL MEDICINE

## 2020-11-11 PROCEDURE — 25000003 PHARM REV CODE 250: Performed by: INTERNAL MEDICINE

## 2020-11-11 PROCEDURE — 93005 ELECTROCARDIOGRAM TRACING: CPT

## 2020-11-11 PROCEDURE — 94761 N-INVAS EAR/PLS OXIMETRY MLT: CPT

## 2020-11-11 PROCEDURE — 36415 COLL VENOUS BLD VENIPUNCTURE: CPT

## 2020-11-11 PROCEDURE — 80053 COMPREHEN METABOLIC PANEL: CPT

## 2020-11-11 PROCEDURE — 84100 ASSAY OF PHOSPHORUS: CPT

## 2020-11-11 PROCEDURE — 83735 ASSAY OF MAGNESIUM: CPT

## 2020-11-11 PROCEDURE — 85025 COMPLETE CBC W/AUTO DIFF WBC: CPT

## 2020-11-11 PROCEDURE — 99900035 HC TECH TIME PER 15 MIN (STAT)

## 2020-11-11 PROCEDURE — 63600175 PHARM REV CODE 636 W HCPCS: Performed by: INTERNAL MEDICINE

## 2020-11-11 PROCEDURE — 99239 PR HOSPITAL DISCHARGE DAY,>30 MIN: ICD-10-PCS | Mod: ,,, | Performed by: INTERNAL MEDICINE

## 2020-11-11 PROCEDURE — 99239 HOSP IP/OBS DSCHRG MGMT >30: CPT | Mod: ,,, | Performed by: INTERNAL MEDICINE

## 2020-11-11 PROCEDURE — S4991 NICOTINE PATCH NONLEGEND: HCPCS | Performed by: INTERNAL MEDICINE

## 2020-11-11 PROCEDURE — 93010 ELECTROCARDIOGRAM REPORT: CPT | Mod: ,,, | Performed by: INTERNAL MEDICINE

## 2020-11-11 RX ORDER — ATORVASTATIN CALCIUM 20 MG/1
20 TABLET, FILM COATED ORAL DAILY
Qty: 30 TABLET | Refills: 1 | Status: SHIPPED | OUTPATIENT
Start: 2020-11-11 | End: 2020-11-17 | Stop reason: SDUPTHER

## 2020-11-11 RX ORDER — ASPIRIN 81 MG/1
81 TABLET ORAL DAILY
Refills: 0
Start: 2020-11-12 | End: 2022-08-09

## 2020-11-11 RX ORDER — AMOXICILLIN AND CLAVULANATE POTASSIUM 875; 125 MG/1; MG/1
1 TABLET, FILM COATED ORAL EVERY 12 HOURS
Qty: 10 TABLET | Refills: 0 | Status: SHIPPED | OUTPATIENT
Start: 2020-11-11 | End: 2020-11-17

## 2020-11-11 RX ORDER — SODIUM,POTASSIUM PHOSPHATES 280-250MG
1 POWDER IN PACKET (EA) ORAL
Qty: 12 PACKET | Refills: 0 | COMMUNITY
Start: 2020-11-11 | End: 2020-11-14

## 2020-11-11 RX ADMIN — POTASSIUM & SODIUM PHOSPHATES POWDER PACK 280-160-250 MG 1 PACKET: 280-160-250 PACK at 09:11

## 2020-11-11 RX ADMIN — HEPARIN SODIUM 5000 UNITS: 5000 INJECTION INTRAVENOUS; SUBCUTANEOUS at 09:11

## 2020-11-11 RX ADMIN — POTASSIUM & SODIUM PHOSPHATES POWDER PACK 280-160-250 MG 1 PACKET: 280-160-250 PACK at 11:11

## 2020-11-11 RX ADMIN — ACETAMINOPHEN 650 MG: 325 TABLET, FILM COATED ORAL at 11:11

## 2020-11-11 RX ADMIN — NICOTINE 1 PATCH: 14 PATCH TRANSDERMAL at 09:11

## 2020-11-11 RX ADMIN — PIPERACILLIN AND TAZOBACTAM 4.5 G: 4; .5 INJECTION, POWDER, LYOPHILIZED, FOR SOLUTION INTRAVENOUS; PARENTERAL at 05:11

## 2020-11-11 RX ADMIN — ASPIRIN 81 MG: 81 TABLET, COATED ORAL at 09:11

## 2020-11-11 RX ADMIN — FLUOXETINE 40 MG: 20 CAPSULE ORAL at 09:11

## 2020-11-11 RX ADMIN — MUPIROCIN: 20 OINTMENT TOPICAL at 09:11

## 2020-11-11 RX ADMIN — SODIUM CHLORIDE: 0.9 INJECTION, SOLUTION INTRAVENOUS at 05:11

## 2020-11-11 NOTE — PLAN OF CARE
Patient on oxygen with documented flow. Will attempt to wean per O2 order protocol.  Pt on prn therapy and doesn't require respiratory tx at this time. Pt with no apparent distress noted. Will continue to monitor.

## 2020-11-11 NOTE — PLAN OF CARE
Patient returned from MRI at change of shift. VSS on RA. No c/o pain. Patient c/o inability to take deep breath. Adequate saturation on RA. Patient continue to have right upper extremity weakness. AAOx4. Ambulates with a steady gait. IV fluids and antibiotic infusing as ordered. Purposeful rounding completed. Patient instructed to call staff with any needs.

## 2020-11-11 NOTE — NURSING
Discharge instructions given, questions answered, pt and mother acknowledged understanding. IV's removed with catheters intact. Medications delivered to bedside from pharmacy. Belongings returned to bedside. Mask provided for discharge. Pt refused transportation, walked out with own power.

## 2020-11-12 ENCOUNTER — PATIENT OUTREACH (OUTPATIENT)
Dept: ADMINISTRATIVE | Facility: CLINIC | Age: 35
End: 2020-11-12

## 2020-11-12 PROBLEM — N17.9 AKI (ACUTE KIDNEY INJURY): Status: RESOLVED | Noted: 2020-11-08 | Resolved: 2020-11-12

## 2020-11-12 NOTE — ASSESSMENT & PLAN NOTE
? Aspiration pna with leukocytosis, lactic acidosis, o2 requirement, subtle change on cxr  Started on zosyn day 4  resp cx normal   duonebs prn  Improved resp status, on room air  Repeat cxr showed worsening pneumonia on the left side and  consolidation on the right side as compared to the previous x-ray on admit  Improving wbc  Symptomatically patient better  DC on Augmentin to complete course of antibiotics  Encouraged to do incentive spirometry

## 2020-11-12 NOTE — DISCHARGE SUMMARY
[facility] Hospital Medicine  Discharge Summary      Patient Name: Garrick White  MRN: [id number]  Admission Date: 11/8/2020  Hospital Length of Stay: 3 days  Discharge Date and Time: 11/11/2020  3:01 PM  Attending Physician: No att. providers found   Discharging Provider: Jaclyn Christensen MD  Primary Care Provider: Ilana Nichole NP      HPI:   35-year-old male with history of anxiety and depression, IV heroin use, came in complaining of right arm weakness.  Patient is not able to give much history.  He states I am very uncomfortable and do not feel good.  He is specially uncomfortable  not able to move his right arm.  Patient is not able to answer all the questions.  He did say that he think he overdosed.  He does not remember falling.  He was not able to tell me whether he drinks alcohol or not but admitted to prior physician.  His workup in the ED showed rhabdomyolysis with acute kidney injury and hyperkalemia of 7.0.  He also had elevated lactate peak of 4.0.  Patient was given normal saline and was started on bicarb drip.  His repeat potassium was 5.9.  His EKG did not show acute changes.  CT head and CT cervical spine were okay.  Chest x-ray showed interstitial prominence in the left lower lung.  The patient is being admitted to ICU for further management.    * No surgery found *      Hospital Course:   Mri showed Two small foci of acute infarction involving the posterior limb of the right internal capsule and the left cerebellar hemisphere.  Consider possible embolic disease. MRA head and neck were normal.Vega and ck levels improving.Echo showed ef 45%, mild global hypokinesis.  He was in sinus rhythm.  He was started on aspirin 81 mg daily.  Neurology was consulted.  PT OT recommended outpatient therapy.  He was discharged on low-dose statin due to recent rhabdo with follow-up of  his CK level as outpatient.  MRI C-spine was normal.  Patient will follow-up with neurology for outpatient nerve  conduction studies for his right arm weakness.  Plan of care was discussed with the patient and his mother.  Patient will be staying in [place] with his parents.       Consults:   Consults (From admission, onward)        Status Ordering Provider     Inpatient consult to Neurology  Once     Provider:  Devika Steinberg MD    Completed XAVIER DASILVA          * Non-traumatic rhabdomyolysis  Questionable history of fall   Patient with CK level of 12,921  Continue IV fluids with bicarb and monitor CK levels  Improving  Changed to  ns ivf  Monitor labs  Improving, CK level 2000  Recommended to drink plenty  water at home      Depression  Restart home medication of Prozac and trazodone      Hypophosphatemia  Replace IV and oral  DC on oral replacement on discharge      Tobacco use  Nicotine patch   advised to quit      Stroke  Mri showed 2 area of infarct  Echo did not show a thrombus or valve abnormality,  blood cx negative  Neuro recs appreciated  PT/OT/ST eval  On asa  Did not  start on statin due to rhabdo in the hospital but discharging on 20 mg Lipitor and follow CK levels outpatient  ldl 41.2  ? Due to drug use  Ambulatory referral placed for outpatient Holter monitor/linq device    Elevated LFTs  Likely due to rhabdo   Monitor labs  Stable  Hep panel negative    Right arm weakness  Ct head negative  Mri brain showed stroke but does not look like reason for arm weakness  Pt/ot efforts  Mri c spine normal  Patient has history of melanoma removal from his neck in the past  Patient will follow-up with neurology for outpatient nerve conduction studies  His arm is improving but still has significant weakness of his deltoid and not able to lift his arm up  Outpatient therapy      Polysubstance (excluding opioids) dependence, daily use  Patient with iv drug use  uds positive for cocaine and opiates  Denies alcohol use  Recent hiv, hep panel  Negative 09/20, repeat negative  S/p banana bag  Prn ativan for withdrawal  Patient  was in rehab for drugs about a year, relapsed , not using daily  Advised to quit drugs        Aspiration pneumonia  ? Aspiration pna with leukocytosis, lactic acidosis, o2 requirement, subtle change on cxr  Started on zosyn day 4  resp cx normal   duonebs prn  Improved resp status, on room air  Repeat cxr showed worsening pneumonia on the left side and  consolidation on the right side as compared to the previous x-ray on admit  Improving wbc  Symptomatically patient better  DC on Augmentin to complete course of antibiotics  Encouraged to do incentive spirometry      Final Active Diagnoses:    Diagnosis Date Noted POA    PRINCIPAL PROBLEM:  Non-traumatic rhabdomyolysis [M62.82] 11/08/2020 Yes    Hypophosphatemia [E83.39] 11/10/2020 Yes    Depression [F32.9] 11/10/2020 Yes    Tobacco use [Z72.0] 11/09/2020 Yes    Lactic acidosis [E87.2] 11/08/2020 Yes    Aspiration pneumonia [J69.0] 11/08/2020 Yes    Polysubstance (excluding opioids) dependence, daily use [F19.20] 11/08/2020 Yes    Right arm weakness [R29.898] 11/08/2020 Yes    Elevated LFTs [R79.89] 11/08/2020 Yes    Stroke [I63.9] 11/08/2020 Yes      Problems Resolved During this Admission:    Diagnosis Date Noted Date Resolved POA    DANYA (acute kidney injury) [N17.9] 11/08/2020 11/12/2020 Yes    Hyperkalemia [E87.5] 11/08/2020 11/09/2020 Yes    Encephalopathy, metabolic [G93.41] 11/08/2020 11/09/2020 Yes       Discharged Condition: stable    Disposition: Home or Self Care    Follow Up:  Follow-up Information     Ilana Nichole NP In 1 week.    Specialty: Family Medicine  Why: with cmp, ck levels  Contact information:  [address]  [address]  [facility] [address] [phone]             Devika Steinberg MD In 1 week.    Specialty: Neurology  Why: for emg studies  Contact information:  [address]  [address]  [facility] [address] [phone]             [facility].    Why: they will call you to schedule first appointment for  outpatient PT.  Please call them to schedule if you do not hear from them by 11/16/20  Contact information:  [address] [place] [address]  [phone]             [facility] Out Patient Physical Therapy.    Specialties: Occupational Therapy, Physical Therapy  Why: they will call you to schedule first appointment  Contact information:  [address]  [facility] [address]  [phone]             [facility] - Cardiology.    Specialties: Cardiology, Cardiovascular Disease, Cardiac Rehabilitation, Cardiothoracic Surgery  Why: they will call you to schedule first appointment  Contact information:  [address] [facility] [address]  [phone]                 Patient Instructions:      Ambulatory referral/consult to Cardiology   Standing Status: Future   Referral Priority: Routine Referral Type: Consultation   Referral Reason: Specialty Services Required   Requested Specialty: Cardiology   Number of Visits Requested: 1     Ambulatory referral/consult to Physical/Occupational Therapy   Standing Status: Future   Referral Priority: Routine Referral Type: Physical Medicine   Referral Reason: Specialty Services Required   Number of Visits Requested: 1     Diet Adult Regular     Activity as tolerated       Significant Diagnostic Studies:   Lab Results   Component Value Date    WBC 11.60 11/11/2020    HGB 13.6 (L) 11/11/2020    HCT 40.3 11/11/2020    MCV 88 11/11/2020     11/11/2020       BMP  Lab Results   Component Value Date     11/11/2020    K 3.8 11/11/2020     (H) 11/11/2020    CO2 22 (L) 11/11/2020    BUN 8 11/11/2020    CREATININE 0.8 11/11/2020    CALCIUM 8.8 11/11/2020    ANIONGAP 10 11/11/2020    ESTGFRAFRICA >60 11/11/2020    EGFRNONAA >60 11/11/2020     MRI Cervical Spine Without Contrast  Narrative: EXAMINATION:  MRI CERVICAL SPINE WITHOUT CONTRAST    CLINICAL HISTORY:  Ataxia, infection suspected;left arm weakness;    TECHNIQUE:  Multiplanar,  multisequence MR images of the cervical spine were performed without the administration of contrast.    COMPARISON:  CT 11/08/2020    FINDINGS:  Alignment: Normal.    Vertebrae: Normal marrow signal. No fracture.    Discs: Normal height and signal.    Cord: Normal.    Skull base and craniocervical junction: Normal.    There are no significant degenerative findings at any cervical level.  No significant nerve root compression.    Paraspinal muscles & soft tissues: Unremarkable.  Impression: Essentially normal cervical MRI.    Electronically signed by: Jay Duncan MD  Date:    11/11/2020  Time:    07:38        EXAMINATION:  MRI BRAIN WITHOUT CONTRAST 11/08/2020     CLINICAL HISTORY:  Stroke, follow up;right arm weakness;     TECHNIQUE:  Multiplanar multisequence MR imaging of the brain was performed without contrast.     COMPARISON:  CT head from the same date.     FINDINGS:  The brain is normal in contour and morphology.  Two small foci of acute infarction are seen within the posterior limb of the right internal capsule and left cerebellar hemisphere. Ventricles are normal in size and configuration without evidence for hydrocephalus.  No intracranial hemorrhage or extraaxial fluid collection.  No mass or mass effect.  Flow voids are normal in appearance.     Visualized paranasal sinuses and mastoid air cells are clear.  Orbits appear normal.     Impression:     Two small foci of acute infarction involving the posterior limb of the right internal capsule and the left cerebellar hemisphere.  Consider possible embolic disease.      MRA brain and MRA neck showed no significant arterial stenosis      Results for orders placed during the hospital encounter of 11/08/20   Echo Color Flow Doppler? Yes; Bubble Contrast? Yes    Narrative · The left ventricle is normal in size with mildly decreased systolic   function. The estimated ejection fraction is 45%.  · There is mild left ventricular global hypokinesis.  · Normal  left ventricular diastolic function.  · Normal right ventricular size with mildly reduced right ventricular   systolic function.  · There is moderate pulmonary hypertension.  · The estimated PA systolic pressure is 51 mmHg.  · Normal central venous pressure (3 mmHg).        Lab Results   Component Value Date    HGBA1C 5.0 11/09/2020     Lab Results   Component Value Date    CHOL 88 (L) 11/09/2020    CHOL 160 09/16/2020     Lab Results   Component Value Date    HDL 33 (L) 11/09/2020    HDL 43 09/16/2020     Lab Results   Component Value Date    LDLCALC 41.2 (L) 11/09/2020    LDLCALC 99 09/16/2020     Lab Results   Component Value Date    TRIG 69 11/09/2020    TRIG 90 09/16/2020     Lab Results   Component Value Date    CHOLHDL 37.5 11/09/2020    CHOLHDL 3.7 09/16/2020         Pending Diagnostic Studies:     None         Medications:  Reconciled Home Medications:      Medication List      START taking these medications    amoxicillin-clavulanate 875-125mg 875-125 mg per tablet  Commonly known as: AUGMENTIN  Take 1 tablet by mouth every 12 (twelve) hours. for 5 days     aspirin 81 MG EC tablet  Commonly known as: ECOTRIN  Take 1 tablet (81 mg total) by mouth once daily.     atorvastatin 20 MG tablet  Commonly known as: LIPITOR  Take 1 tablet (20 mg total) by mouth once daily.     potassium, sodium phosphates 280-160-250 mg Pwpk  Commonly known as: PHOS-NAK  Take 1 packet by mouth 4 (four) times daily with meals and nightly. for 3 days        CONTINUE taking these medications    FLUoxetine 40 MG capsule  Take 1 capsule (40 mg total) by mouth once daily.     traZODone 50 MG tablet  Commonly known as: DESYREL  Take 1 tablet (50 mg total) by mouth every evening.            Indwelling Lines/Drains at time of discharge:   Lines/Drains/Airways     None                 Time spent on the discharge of patient: 45  minutes  Patient was seen and examined on the date of discharge and determined to be suitable for discharge.          Jaclyn Christensen MD  Department of Hospital Medicine  [facility]

## 2020-11-12 NOTE — ASSESSMENT & PLAN NOTE
Mri showed 2 area of infarct  Echo did not show a thrombus or valve abnormality,  blood cx negative  Neuro recs appreciated  PT/OT/ST eval  On asa  Did not  start on statin due to rhabdo in the hospital but discharging on 20 mg Lipitor and follow CK levels outpatient  ldl 41.2  ? Due to drug use  Ambulatory referral placed for outpatient Holter monitor/linq device

## 2020-11-12 NOTE — ASSESSMENT & PLAN NOTE
Questionable history of fall   Patient with CK level of 12,921  Continue IV fluids with bicarb and monitor CK levels  Improving  Changed to  ns ivf  Monitor labs  Improving, CK level 2000  Recommended to drink plenty  water at home

## 2020-11-12 NOTE — ASSESSMENT & PLAN NOTE
Patient with iv drug use  uds positive for cocaine and opiates  Denies alcohol use  Recent hiv, hep panel  Negative 09/20, repeat negative  S/p banana bag  Prn ativan for withdrawal  Patient was in rehab for drugs about a year, relapsed , not using daily  Advised to quit drugs

## 2020-11-12 NOTE — ASSESSMENT & PLAN NOTE
Ct head negative  Mri brain showed stroke but does not look like reason for arm weakness  Pt/ot efforts  Mri c spine normal  Patient has history of melanoma removal from his neck in the past  Patient will follow-up with neurology for outpatient nerve conduction studies  His arm is improving but still has significant weakness of his deltoid and not able to lift his arm up  Outpatient therapy

## 2020-11-13 LAB
BACTERIA BLD CULT: NORMAL
BACTERIA BLD CULT: NORMAL

## 2020-11-17 ENCOUNTER — CLINICAL SUPPORT (OUTPATIENT)
Dept: CARDIOLOGY | Facility: HOSPITAL | Age: 35
End: 2020-11-17
Attending: NURSE PRACTITIONER
Payer: MEDICAID

## 2020-11-17 ENCOUNTER — OFFICE VISIT (OUTPATIENT)
Dept: FAMILY MEDICINE | Facility: CLINIC | Age: 35
End: 2020-11-17
Payer: MEDICAID

## 2020-11-17 VITALS
HEIGHT: 70 IN | SYSTOLIC BLOOD PRESSURE: 108 MMHG | HEART RATE: 68 BPM | BODY MASS INDEX: 20.19 KG/M2 | DIASTOLIC BLOOD PRESSURE: 78 MMHG | TEMPERATURE: 98 F | WEIGHT: 141 LBS

## 2020-11-17 DIAGNOSIS — R93.1 DECREASED CARDIAC EJECTION FRACTION: Primary | ICD-10-CM

## 2020-11-17 DIAGNOSIS — F11.11 HISTORY OF HEROIN ABUSE: ICD-10-CM

## 2020-11-17 DIAGNOSIS — R00.2 PALPITATIONS: ICD-10-CM

## 2020-11-17 DIAGNOSIS — F41.9 ANXIETY: ICD-10-CM

## 2020-11-17 DIAGNOSIS — F32.A DEPRESSION, UNSPECIFIED DEPRESSION TYPE: ICD-10-CM

## 2020-11-17 DIAGNOSIS — I63.9 CEREBROVASCULAR ACCIDENT (CVA), UNSPECIFIED MECHANISM: ICD-10-CM

## 2020-11-17 PROCEDURE — 93227 HOLTER MONITOR - 24 HOUR (CUPID ONLY): ICD-10-PCS | Mod: ,,, | Performed by: INTERNAL MEDICINE

## 2020-11-17 PROCEDURE — 99496 TRANSJ CARE MGMT HIGH F2F 7D: CPT | Mod: S$GLB,,, | Performed by: NURSE PRACTITIONER

## 2020-11-17 PROCEDURE — 99496 TRANSITIONAL CARE MANAGE SERVICE 7 DAY DISCHARGE: ICD-10-PCS | Mod: S$GLB,,, | Performed by: NURSE PRACTITIONER

## 2020-11-17 PROCEDURE — 93227 XTRNL ECG REC<48 HR R&I: CPT | Mod: ,,, | Performed by: INTERNAL MEDICINE

## 2020-11-17 PROCEDURE — 93226 XTRNL ECG REC<48 HR SCAN A/R: CPT

## 2020-11-17 RX ORDER — QUETIAPINE FUMARATE 50 MG/1
50 TABLET, FILM COATED ORAL NIGHTLY
Qty: 30 TABLET | Refills: 11 | Status: SHIPPED | OUTPATIENT
Start: 2020-11-17 | End: 2022-08-09

## 2020-11-17 RX ORDER — FLUOXETINE HYDROCHLORIDE 40 MG/1
40 CAPSULE ORAL DAILY
Qty: 30 CAPSULE | Refills: 3 | Status: SHIPPED | OUTPATIENT
Start: 2020-11-17 | End: 2022-08-09

## 2020-11-17 RX ORDER — TRAZODONE HYDROCHLORIDE 50 MG/1
50 TABLET ORAL NIGHTLY
Qty: 30 TABLET | Refills: 11 | Status: SHIPPED | OUTPATIENT
Start: 2020-11-17 | End: 2020-12-07

## 2020-11-17 RX ORDER — ATORVASTATIN CALCIUM 20 MG/1
20 TABLET, FILM COATED ORAL DAILY
Qty: 30 TABLET | Refills: 1 | Status: SHIPPED | OUTPATIENT
Start: 2020-11-17 | End: 2020-12-07

## 2020-11-17 NOTE — PROGRESS NOTES
SUBJECTIVE:    Patient ID: Garrick White is a 35 y.o. male.    Chief Complaint: Follow-up (brought bottles, needs refills, declined flu shot DJ)    35-year-old male presents for hospital follow up. He has a history of  anxiety and depression, IV heroin use. He presented to the er  complaining of right arm weakness. He was not able to give much history.  He reported being very uncomfortable. He was not able to move his right arm.  Patient is not able to answer all the questions.  He did say that he think he overdosed.   His workup in the ED showed rhabdomyolysis with acute kidney injury and hyperkalemia of 7.0.  He also had elevated lactate peak of 4.0.  Patient was given normal saline and was started on bicarb drip.  His repeat potassium was 5.9.  His EKG did not show acute changes.  CT head and CT cervical spine were okay.  Chest x-ray showed interstitial prominence in the left lower lung. :   Mri showed Two small foci of acute infarction involving the posterior limb of the right internal capsule and the left cerebellar hemisphere.  Consider possible embolic disease. MRA head and neck were normal.Vega and ck levels improving.Echo showed ef 45%, mild global hypokinesis.  He was in sinus rhythm.  He was started on aspirin 81 mg daily.  Neurology was consulted.  PT OT recommended outpatient therapy.  He was discharged on low-dose statin due to recent rhabdo with follow-up of  his CK level as outpatient.  MRI C-spine was normal.  Patient will follow-up with neurology for outpatient nerve conduction studies for his right arm weakness.   Mother is present during the exam today. He reports that right arm weakness persists. He has follow up scheduled with providers at [facility]. Patient is unable to keep those appointments. He is currently not driving. He has been sober since this hospitalization. Still does not recall the majority of the events leading up to hospitalization. Patient is scheduled with pt and ot this week.        Admit Date: 11/8/20   Discharge Date: 11/11/20  Discharge Facility: Hospital    Medication Reconciliation:  Medications changed/added/deleted. Medication list reconciled  New Prescriptions filled after discharge: yes  Discharge summary reviewed:  yes  Pending test results at discharge reviewed:   not applicable  Follow up appointments scheduled:  yes   with Cardiology   Follow up labs/tests ordered:   no  Home Health ordered on discharge:   not applicable  How patient is feeling since discharge from the hospital?  Remains sober. Still has significant right sided weakness    Clinical Support on 11/17/2020   Component Date Value Ref Range Status    Holter length hours 11/17/2020 24   In process    holter length minutes 11/17/2020 0   In process    holter length dec hours 11/17/2020 24.00   In process    Event Monitor Day 11/17/2020 0   In process   No results displayed because visit has over 200 results.      Office Visit on 09/16/2020   Component Date Value Ref Range Status    QuantiFERON-TB Gold Plus 09/16/2020 NEGATIVE  NEGATIVE Final    NIL 09/16/2020 0.17  IU/mL Final    Mitogen - Nil 09/16/2020 9.04  IU/mL Final    TB1 - Nil 09/16/2020 0.11  IU/mL Final    TB2 - Nil 09/16/2020 0.12  IU/mL Final    HIV Ag/Ab 4th Gen 09/16/2020 NON-REACTIVE  NON-REACTIVE Final    Hep A IgM 09/16/2020 NON-REACTIVE  NON-REACTIVE Final    Comment 09/16/2020 For additional information, please refer to http://education.[employer]/faq/EFV104 (This link is being provided for informational/ educational purposes only.)   Final    Hepatitis B Surface Ag 09/16/2020 NON-REACTIVE  NON-REACTIVE Final    Hep B C IgM 09/16/2020 NON-REACTIVE  NON-REACTIVE Final    Hepatitis C Ab 09/16/2020 NON-REACTIVE  NON-REACTIVE Final    Signal/Cutoff 09/16/2020 0.01  <1.00 Final   Office Visit on 08/11/2020   Component Date Value Ref Range Status    WBC 09/16/2020 7.0  3.8 - 10.8 Thousand/uL Final    RBC 09/16/2020 5.33  4.20  - 5.80 Million/uL Final    Hemoglobin 09/16/2020 16.4  13.2 - 17.1 g/dL Final    Hematocrit 09/16/2020 47.1  38.5 - 50.0 % Final    MCV 09/16/2020 88.4  80.0 - 100.0 fL Final    MCH 09/16/2020 30.8  27.0 - 33.0 pg Final    MCHC 09/16/2020 34.8  32.0 - 36.0 g/dL Final    RDW 09/16/2020 13.4  11.0 - 15.0 % Final    Platelets 09/16/2020 306  140 - 400 Thousand/uL Final    MPV 09/16/2020 10.3  7.5 - 12.5 fL Final    Neutrophils Absolute 09/16/2020 5,404  1,500 - 7,800 cells/uL Final    Lymph # 09/16/2020 1,183  850 - 3,900 cells/uL Final    Mono # 09/16/2020 378  200 - 950 cells/uL Final    Eos # 09/16/2020 7* 15 - 500 cells/uL Final    Baso # 09/16/2020 28  0 - 200 cells/uL Final    Neutrophils Relative 09/16/2020 77.2  % Final    Lymph % 09/16/2020 16.9  % Final    Mono % 09/16/2020 5.4  % Final    Eosinophil % 09/16/2020 0.1  % Final    Basophil % 09/16/2020 0.4  % Final    Glucose 09/16/2020 100  65 - 139 mg/dL Final    BUN 09/16/2020 14  7 - 25 mg/dL Final    Creatinine 09/16/2020 1.03  0.60 - 1.35 mg/dL Final    eGFR if non African American 09/16/2020 94  > OR = 60 mL/min/1.73m2 Final    eGFR if African American 09/16/2020 109  > OR = 60 mL/min/1.73m2 Final    BUN/Creatinine Ratio 09/16/2020 NOT APPLICABLE  6 - 22 (calc) Final    Sodium 09/16/2020 137  135 - 146 mmol/L Final    Potassium 09/16/2020 4.6  3.5 - 5.3 mmol/L Final    Chloride 09/16/2020 98  98 - 110 mmol/L Final    CO2 09/16/2020 29  20 - 32 mmol/L Final    Calcium 09/16/2020 10.4* 8.6 - 10.3 mg/dL Final    Total Protein 09/16/2020 7.7  6.1 - 8.1 g/dL Final    Albumin 09/16/2020 4.8  3.6 - 5.1 g/dL Final    Globulin, Total 09/16/2020 2.9  1.9 - 3.7 g/dL (calc) Final    Albumin/Globulin Ratio 09/16/2020 1.7  1.0 - 2.5 (calc) Final    Total Bilirubin 09/16/2020 1.4* 0.2 - 1.2 mg/dL Final    Alkaline Phosphatase 09/16/2020 53  36 - 130 U/L Final    AST 09/16/2020 24  10 - 40 U/L Final    ALT 09/16/2020 17  9 - 46 U/L  Final    TSH w/reflex to FT4 09/16/2020 0.58  0.40 - 4.50 mIU/L Final    Color, UA 09/16/2020 YELLOW  YELLOW Final    Appearance, UA 09/16/2020 CLEAR  CLEAR Final    Specific [place], UA 09/16/2020 1.020  1.001 - 1.035 Final    pH, UA 09/16/2020 7.5  5.0 - 8.0 Final    Glucose, UA 09/16/2020 NEGATIVE  NEGATIVE Final    Bilirubin, UA 09/16/2020 NEGATIVE  NEGATIVE Final    Ketones, UA 09/16/2020 NEGATIVE  NEGATIVE Final    Occult Blood UA 09/16/2020 NEGATIVE  NEGATIVE Final    Protein, UA 09/16/2020 NEGATIVE  NEGATIVE Final    Nitrite, UA 09/16/2020 NEGATIVE  NEGATIVE Final    Leukocytes, UA 09/16/2020 NEGATIVE  NEGATIVE Final    WBC Casts, UA 09/16/2020 NONE SEEN  < OR = 5 /HPF Final    RBC Casts, UA 09/16/2020 NONE SEEN  < OR = 2 /HPF Final    Squam Epithel, UA 09/16/2020 NONE SEEN  < OR = 5 /HPF Final    Bacteria, UA 09/16/2020 NONE SEEN  NONE SEEN /HPF Final    Hyaline Casts, UA 09/16/2020 NONE SEEN  NONE SEEN /LPF Final    Reflexive Urine Culture 09/16/2020 NO CULTURE INDICATED   Final    Cholesterol 09/16/2020 160  <200 mg/dL Final    HDL 09/16/2020 43  > OR = 40 mg/dL Final    Triglycerides 09/16/2020 90  <150 mg/dL Final    LDL Cholesterol 09/16/2020 99  mg/dL (calc) Final    HDL/Cholesterol Ratio 09/16/2020 3.7  <5.0 (calc) Final    Non HDL Chol. (LDL+VLDL) 09/16/2020 117  <130 mg/dL (calc) Final    Testosterone, Free 09/16/2020 46.2  46.0 - 224.0 pg/mL Final    TESTOSTERONE, TOTAL, MALE 09/16/2020 293  250 - 827 ng/dL Final       Past Medical History:   Diagnosis Date    Anxiety     Depression     Melanoma      Past Surgical History:   Procedure Laterality Date    EXCISION OF MELANOMA      back of neck     Family History   Problem Relation Age of Onset    Hyperlipidemia Mother     ADD / ADHD Brother        Marital Status:   Alcohol History:  reports no history of alcohol use.  Tobacco History:  reports that he has been smoking. He has been smoking about 0.00 packs per  "day. He has never used smokeless tobacco.  Drug History:  reports current drug use.    Review of patient's allergies indicates:  No Known Allergies    Current Outpatient Medications:     aspirin (ECOTRIN) 81 MG EC tablet, Take 1 tablet (81 mg total) by mouth once daily., Disp: , Rfl: 0    atorvastatin (LIPITOR) 20 MG tablet, Take 1 tablet (20 mg total) by mouth once daily., Disp: 30 tablet, Rfl: 1    FLUoxetine 40 MG capsule, Take 1 capsule (40 mg total) by mouth once daily., Disp: 30 capsule, Rfl: 3    traZODone (DESYREL) 50 MG tablet, Take 1 tablet (50 mg total) by mouth every evening., Disp: 30 tablet, Rfl: 11    QUEtiapine (SEROQUEL) 50 MG tablet, Take 1 tablet (50 mg total) by mouth every evening., Disp: 30 tablet, Rfl: 11    Review of Systems   Constitutional: Negative for fatigue, fever and unexpected weight change.   HENT: Negative for ear pain, sinus pressure and sore throat.    Eyes: Negative for pain.   Respiratory: Negative for cough and shortness of breath.    Cardiovascular: Negative for chest pain and leg swelling.   Gastrointestinal: Negative for abdominal pain, constipation, nausea and vomiting.   Genitourinary: Negative for dysuria, frequency and urgency.   Musculoskeletal: Negative for arthralgias.        Neck pain   Skin: Negative for rash.   Neurological: Positive for weakness. Negative for dizziness and headaches.   Psychiatric/Behavioral: Negative for sleep disturbance.        Objective:      Vitals:    11/17/20 1016   BP: 108/78   Pulse: 68   Temp: 98.2 °F (36.8 °C)   Weight: 64 kg (141 lb)   Height: 5' 10" (1.778 m)     Physical Exam  Constitutional:       Appearance: He is well-developed.   HENT:      Head: Normocephalic and atraumatic.      Right Ear: External ear normal.      Left Ear: External ear normal.   Eyes:      Pupils: Pupils are equal, round, and reactive to light.   Neck:      Musculoskeletal: Neck supple.      Trachea: No tracheal deviation.   Cardiovascular:      Rate and " Rhythm: Normal rate and regular rhythm.      Heart sounds: No murmur. No friction rub. No gallop.    Pulmonary:      Breath sounds: Normal breath sounds. No stridor. No wheezing or rales.   Abdominal:      Palpations: Abdomen is soft. There is no mass.      Tenderness: There is no abdominal tenderness.   Musculoskeletal:         General: No deformity.      Right shoulder: He exhibits decreased range of motion and tenderness.      Cervical back: He exhibits tenderness.        Back:    Lymphadenopathy:      Cervical: No cervical adenopathy.   Skin:     General: Skin is warm and dry.   Neurological:      Mental Status: He is alert and oriented to person, place, and time.      Coordination: Romberg sign negative. Coordination normal.   Psychiatric:         Mood and Affect: Mood is anxious and depressed.         Thought Content: Thought content normal.         Assessment:       1. Decreased cardiac ejection fraction    2. Anxiety    3. Depression, unspecified depression type    4. History of heroin abuse    5. Cerebrovascular accident (CVA), unspecified mechanism    6. Palpitations         Plan:       Decreased cardiac ejection fraction  -     Ambulatory referral/consult to Cardiology; Future; Expected date: 11/24/2020    Anxiety  Comments:  name of therapist given to patient. will call if trouble scheduling  Orders:  -     traZODone (DESYREL) 50 MG tablet; Take 1 tablet (50 mg total) by mouth every evening.  Dispense: 30 tablet; Refill: 11    Depression, unspecified depression type  -     FLUoxetine 40 MG capsule; Take 1 capsule (40 mg total) by mouth once daily.  Dispense: 30 capsule; Refill: 3  -     Ambulatory referral/consult to Psychiatry; Future; Expected date: 11/24/2020    History of heroin abuse  -     Ambulatory referral/consult to Psychiatry; Future; Expected date: 11/24/2020    Cerebrovascular accident (CVA), unspecified mechanism  -     atorvastatin (LIPITOR) 20 MG tablet; Take 1 tablet (20 mg total) by  mouth once daily.  Dispense: 30 tablet; Refill: 1  -     Ambulatory referral/consult to Neurology; Future; Expected date: 11/24/2020    Palpitations  -     Holter monitor - 24 hour; Future    Other orders  -     QUEtiapine (SEROQUEL) 50 MG tablet; Take 1 tablet (50 mg total) by mouth every evening.  Dispense: 30 tablet; Refill: 11      Follow up in about 4 weeks (around 12/15/2020) for medication management.

## 2020-11-20 ENCOUNTER — CLINICAL SUPPORT (OUTPATIENT)
Dept: REHABILITATION | Facility: HOSPITAL | Age: 35
End: 2020-11-20
Payer: MEDICAID

## 2020-11-20 ENCOUNTER — TELEPHONE (OUTPATIENT)
Dept: NEUROLOGY | Facility: CLINIC | Age: 35
End: 2020-11-20

## 2020-11-20 DIAGNOSIS — M62.82 NON-TRAUMATIC RHABDOMYOLYSIS: ICD-10-CM

## 2020-11-20 PROCEDURE — 97110 THERAPEUTIC EXERCISES: CPT | Mod: PN

## 2020-11-20 PROCEDURE — 97161 PT EVAL LOW COMPLEX 20 MIN: CPT | Mod: PN

## 2020-11-20 NOTE — PATIENT INSTRUCTIONS
Shoulder Clock        Imagine a clock on the wall. Shrug shoulders up to 12 o'clock, down to 6 o'clock, forward to 3 o'clock, and back to 9 o'clock.  Hold ____ seconds at each position. Repeat ____ times. Do ____ sessions per day.    Copyright © [facility]. All rights reserved.   Shoulder [place]        Begin with shoulders relaxed, back straight, head centered over spine. Slowly [place] shoulders up, back, down, and forward to starting position.  Repeat ____ times in a continuous motion.    Copyright © I. All rights reserved.   Shoulder: External Rotation        Hold left elbow in direct line with shoulder, hand pointing to ceiling.  Slowly drop arm until back of wrist touches floor. Keep shoulder flat. Keep elbow and shoulder in line.  Hold ____ seconds. Repeat ____ times. Do ____ sessions per day.  CAUTION: Stretch slowly and gently.  Assist only if needed.    Copyright © [facility]. All rights reserved.   Shoulder: Internal Rotation        Hold left elbow in direct line with shoulder, hand pointing to ceiling.  Slowly drop arm until inside of wrist touches floor. Keep shoulder flat. Keep elbow and shoulder in line.  Hold ____ seconds. Repeat ____ times. Do ____ sessions per day.  CAUTION: Stretch slowly and gently.  Assist only if needed.    Copyright © I. All rights reserved.   SHOULDER: Flexion - Supine        Raise one arm overhead with palm up. ___ reps per set, ___ sets per day, ___ days per week  Raise both arms at same speed. Alternate raising arms, left then right.    Copyright © I. All rights reserved.   Flexion Lift With Wand        Hold wand with palms up. Lift over head to a pain free range.  Hold ____ seconds. Repeat ____ times. Do ____ sessions per day.    Copyright © I. All rights reserved.   Abduction Lift With Wand        Hold wand palm up on right and palm down on left. Reach right hand to side and toward ceiling for ____ repetitions. Reverse hand position and repeat to left.  Hold ____ seconds each  upward position. Do ____ sessions per day.    Copyright © I. All rights reserved.

## 2020-11-20 NOTE — PLAN OF CARE
[facility]  Physical Therapy Initial Evaluation    Date: 11/20/2020   Name: Garrick White  Clinic Number: [id number]    Therapy Diagnosis:   Encounter Diagnosis   Name Primary?    Non-traumatic rhabdomyolysis      Physician: Jaclyn Christensen MD    Physician Orders: PT Eval and Treat   Medical Diagnosis from Referral: Nontraumatic rhabdomyolysis  Evaluation Date: 11/20/2020  Authorization Period Expiration: 12/20/20  Plan of Care Expiration: 1/5/21  Visit # / Visits authorized: 1/ 1    Time In: 1415  Time Out: 1500  Total Appointment Time (timed & untimed codes): 45 minutes    Precautions: Standard    Subjective   Date of onset: 11/11/20  History of current condition - Garrick reports: c/o R arm weakness and clavicle pain after being hospitalized for non traumatic rhabdomyolysis. Had hx of same condition after excision of melanoma in the neck 2-3 years ago.     Medical History:   Past Medical History:   Diagnosis Date    Anxiety     Depression     Melanoma        Surgical History:   Garrick White  has a past surgical history that includes Excision of melanoma.    Medications:   Garrick has a current medication list which includes the following prescription(s): aspirin, atorvastatin, fluoxetine, quetiapine, and trazodone.    Allergies:   Review of patient's allergies indicates:  No Known Allergies     Imaging, MRI studies: 11/10/20  neck Alignment: Normal.   Vertebrae: Normal marrow signal. No fracture.  Discs: Normal height and signal.   Cord: Normal.  Skull base and craniocervical junction: Normal.  There are no significant degenerative findings at any cervical level.  No significant nerve root compression.  Paraspinal muscles & soft tissues: Unremarkable.    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  Aggravating Factors: Flexing  Easing Factors: pain medication and rest    Patients goals: to get stronger and rid of pain     Objective   Posture: posterior pelvis tilt; kyphotic; lateral scapular winging, anterior head   Palpation: atrophy of the parascapular muscle R  Sensation: numbness to the clavicular area R side.  Range of Motion/Strength: All PROM to R shoulder WFL except IR 50deg with rigid end feel.  Shoulder Right  Pain/Dysfunction with Movement    PROM MMT    flexion   180     1+/5    extension    20     2/5  tight   abduction   180     1+/5    Adduction Horiz  40   2-/5    Internal rotation   50   2-/5  rigid end feel   ER at 90° abd   180   2-/5     Distal  Elbow/wrist/hand function RUE   4-/5 gross  Flexibility: no significant joint limitation  Gait: Without AD  Analysis: no significant abnormality to report except poor posture.  Bed Mobility:Independent  Transfers: Independent  Other: FOTO 78/100      Limitation/Restriction for FOTO neuromuscular Survey    Therapist reviewed FOTO scores for Garrick White on 11/20/2020.   Limitation Score: 22%         TREATMENT   Treatment Time In: 1445  Treatment Time Out: 1500  Total Treatment time (time-based codes) separate from Evaluation: 15 minutes    Garrick received therapeutic exercises to develop function for 15 minutes including:  Wand Exercises Bilat sh flex/abd  AAROM IR/ER  Scapular mobility: retraction/protraction/elevation-depression/shoulder circles    Home Exercises and Patient Education Provided    Education provided:   -Discussed POC; HEP Instructions    Written Home Exercises Provided: yes.  Exercises were reviewed and Garrick was able to demonstrate them prior to the end of the session.  Garrick demonstrated good  understanding of the education provided. "     See EMR under Patient Instructions for exercises provided 11/20/2020.    Assessment   Garrick is a 35 y.o. male referred to outpatient Physical Therapy with a medical diagnosis of Non traumatic rhabdomyolysis. Patient presents with right shoulder weakness and loss of function.    Patient prognosis is Fair.   Patientt will benefit from skilled outpatient Physical Therapy to address the deficits stated above and in the chart below, provide patient /family education, and to maximize patientt's level of independence.     Plan of care discussed with patient: Yes  Patient's spiritual, cultural and educational needs considered and patient is agreeable to the plan of care and goals as stated below:     Anticipated Barriers for therapy: none    Medical Necessity is demonstrated by the following  History  Co-morbidities and personal factors that may impact the plan of care Co-morbidities:   history of cancer and history of CVA    Personal Factors:   coping style     low   Examination  Body Structures and Functions, activity limitations and participation restrictions that may impact the plan of care Body Regions:   neck  upper extremities    Body Systems:    gross symmetry  ROM  strength    Participation Restrictions:   none    Activity limitations:   Learning and applying knowledge  no deficits    General Tasks and Commands  no deficits    Communication  no deficits    Mobility  lifting and carrying objects    Self care  caring for body parts (brushing teeth, shaving, grooming)  dressing    Domestic Life  doing house work (cleaning house, washing dishes, laundry)    Interactions/Relationships  no deficits    Life Areas  no deficits    Community and Social Life  no deficits         low   Clinical Presentation stable and uncomplicated low   Decision Making/ Complexity Score: low     Goals:  Short Term Goals: 2 weeks   1. Patient will report compliance to HEP.  2. Patient will be able to lift B shoulder forward flexion 2#s  dowel shoulder level.  3. Increased R sh IR 65 deg    Long Term Goals: 6 weeks   1. Improve motor strength R sh 3-/5 gross  2. Patient will demonstrate Active sh flex in supine against gravity to 90 deg  3. Patient will have 90% R sh IR  4. Patient will demonstrate ability to place R hand on head to comb hair.  3.     Plan   Plan of care Certification: 11/20/2020 to 1//5/21.    Outpatient Physical Therapy 2 times weekly for 6 weeks to include the following interventions: Electrical Stimulation ,, Manual Therapy, Neuromuscular Re-ed, Therapeutic Activites and Therapeutic Exercise., Pain modalities.    Dru Haynes, PT

## 2020-11-20 NOTE — TELEPHONE ENCOUNTER
----- Message from Rosalind Rashid sent at 11/20/2020  1:20 PM CST -----  Regarding: Patient Call Back  Who Called: Yane Brito (Mother)    What is the request in detail:Would like a call back in regards to getting her son seen for a hospital follow up.    Can the clinic reply by  [facility]? No    Best Call Back Number: [phone]

## 2020-11-27 ENCOUNTER — TELEPHONE (OUTPATIENT)
Dept: REHABILITATION | Facility: HOSPITAL | Age: 35
End: 2020-11-27

## 2020-12-04 ENCOUNTER — CLINICAL SUPPORT (OUTPATIENT)
Dept: REHABILITATION | Facility: HOSPITAL | Age: 35
End: 2020-12-04
Payer: MEDICAID

## 2020-12-04 DIAGNOSIS — R29.898 WEAKNESS OF RIGHT UPPER EXTREMITY: Primary | ICD-10-CM

## 2020-12-04 PROCEDURE — 97110 THERAPEUTIC EXERCISES: CPT | Mod: PN

## 2020-12-04 NOTE — PROGRESS NOTES
"  Physical Therapy Treatment Note     Name: Garrick White  Clinic Number: [id number]    Therapy Diagnosis: R arm weakness  Physician: Jaclyn Christensen MD    Visit Date: 12/4/2020    Physician Orders: eval and treat  Medical Diagnosis: non traumatic rhabdomyolysis  Evaluation Date: 11/20/20  Authorization Period Expiration: 1/8//21  Plan of Care Certification Period: 1/5/21  Visit #/Visits authorized: 2/ 12     Time In: 1333  Time Out: 1415  Total Billable Time: 42 minutes    Precautions: Standard    Subjective     Pt reports: occasional "nerve pain" to the front of right shoulder  He was compliant with home exercise program.  Response to previous treatment: on initial visit  Functional change: none    Pain: 0/10  Location: N/A      Objective     Garrick received therapeutic exercises to develop strength and endurance for 42 minutes including:  Scapular mobs: retraction 20; protraction 20; elevation/depression 20; shoulder circles 10/10  PROM R shoulder all planes of full motion.  Isometrics R sh:  Adduction 20; abduction 20; ext 20; flex 20; IR 20; ER 20  AAROM shoulder flex/abd  10/10  Wand Exercises Flex 20; abd 20    Home Exercises Provided and Patient Education Provided     Education provided:   - Active shoulder function against gravity    Written Home Exercises Provided: Patient instructed to cont prior HEP.  Exercises were reviewed and Garrick was able to demonstrate them prior to the end of the session.  Garrick demonstrated good  understanding of the education provided.     See EMR under Patient Instructions for exercises provided 11/20/20.    Assessment     Patient has active rotations of the right shoulder; no active mobility in flex/abd against gravity. Tolerated Rx well.  Garrick is progressing towards his goals.   Pt prognosis is Good.     Pt will continue to benefit from skilled outpatient physical therapy to address the deficits listed in the problem list box on initial evaluation, provide " pt/family education and to maximize pt's level of independence in the home and community environment.     Pt's spiritual, cultural and educational needs considered and pt agreeable to plan of care and goals.     Anticipated barriers to physical therapy: none    Goals: (ongoing)  1. Patient will report compliance to HEP.  2. Patient will be able to lift B shoulder forward flexion 2#s dowel shoulder level.  3. Increased R sh IR 65 deg    Plan   Supine lying Ex's to minimize gravity with overhead function.  Cont POC.      Dru Haynes, PT

## 2020-12-07 ENCOUNTER — OFFICE VISIT (OUTPATIENT)
Dept: CARDIOLOGY | Facility: CLINIC | Age: 35
End: 2020-12-07
Payer: MEDICAID

## 2020-12-07 VITALS
BODY MASS INDEX: 20.47 KG/M2 | RESPIRATION RATE: 18 BRPM | WEIGHT: 143 LBS | HEART RATE: 93 BPM | HEIGHT: 70 IN | SYSTOLIC BLOOD PRESSURE: 108 MMHG | OXYGEN SATURATION: 98 % | DIASTOLIC BLOOD PRESSURE: 62 MMHG

## 2020-12-07 DIAGNOSIS — I63.9 CEREBROVASCULAR ACCIDENT (CVA), UNSPECIFIED MECHANISM: Primary | ICD-10-CM

## 2020-12-07 DIAGNOSIS — F19.10 SUBSTANCE ABUSE: ICD-10-CM

## 2020-12-07 DIAGNOSIS — F32.A DEPRESSION, UNSPECIFIED DEPRESSION TYPE: ICD-10-CM

## 2020-12-07 DIAGNOSIS — I51.9 LV DYSFUNCTION: ICD-10-CM

## 2020-12-07 DIAGNOSIS — R29.898 WEAKNESS OF RIGHT UPPER EXTREMITY: ICD-10-CM

## 2020-12-07 DIAGNOSIS — J69.0 ASPIRATION PNEUMONIA OF RIGHT LOWER LOBE, UNSPECIFIED ASPIRATION PNEUMONIA TYPE: ICD-10-CM

## 2020-12-07 DIAGNOSIS — R79.89 ELEVATED LFTS: ICD-10-CM

## 2020-12-07 PROCEDURE — 93000 EKG 12-LEAD: ICD-10-PCS | Mod: S$GLB,,, | Performed by: INTERNAL MEDICINE

## 2020-12-07 PROCEDURE — 99204 OFFICE O/P NEW MOD 45 MIN: CPT | Mod: 25,S$GLB,, | Performed by: INTERNAL MEDICINE

## 2020-12-07 PROCEDURE — 93000 ELECTROCARDIOGRAM COMPLETE: CPT | Mod: S$GLB,,, | Performed by: INTERNAL MEDICINE

## 2020-12-07 PROCEDURE — 99204 PR OFFICE/OUTPT VISIT, NEW, LEVL IV, 45-59 MIN: ICD-10-PCS | Mod: 25,S$GLB,, | Performed by: INTERNAL MEDICINE

## 2020-12-07 RX ORDER — SODIUM CHLORIDE 9 MG/ML
INJECTION, SOLUTION INTRAVENOUS CONTINUOUS
Status: CANCELLED | OUTPATIENT
Start: 2020-12-07

## 2020-12-07 NOTE — ASSESSMENT & PLAN NOTE
Patient has significant LV dysfunction ejection fraction of 45% on the echocardiogram with mild  global hypokinesis, this warrants further investigation.    Will obtain a stress Myoview to evaluate for any source of ischemia contributing to symptoms.  In the meantime are maintain low-fat low-salt diet.

## 2020-12-07 NOTE — ASSESSMENT & PLAN NOTE
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  New to rule out cardiac source of emboli including vegetation or and or shunts.  This is probably secondary to Overdose.   Recommend him to continue ASA Daily  Check Lipid Profile and consider starting statin therapy.  He has fortunately a recovered fair amount of strength back at this time.    DANIEL   Consider Loop Recorder for Arrhythmia

## 2020-12-07 NOTE — PROGRESS NOTES
Subjective:    Patient ID:  Garrick White is a 35 y.o. male patient here for evaluation Cerebrovascular Accident (recent stroke)      History of Present Illness:       Mr. White is a 35 year old male patient with a PMH significant for recent CVA. He overdosed and had a CVA. He denies any chest pain or SOB prior to the episode. He denies any recent issues besides right sided weakness to the arm. He is able to walk just fine. He relapsed two times and this was the second time. He is not currently in any programs.  His drug of choice was heroine. This is what he OD on. He was given atorvastatin and this cause a rash. He did not have a DANIEL while he was in the hospital. His CVA seems embolic on CT Scan.      HIS MOST RECENT ECHOCARDIOGRAM IS AS FOLLOWS:    Conclusion    · The left ventricle is normal in size with mildly decreased systolic function. The estimated ejection fraction is 45%.  · There is mild left ventricular global hypokinesis.  · Normal left ventricular diastolic function.  · Normal right ventricular size with mildly reduced right ventricular systolic function.  · There is moderate pulmonary hypertension.  · The estimated PA systolic pressure is 51 mmHg.  · Normal central venous pressure (3 mmHg).            Review of patient's allergies indicates:  No Known Allergies    Past Medical History:   Diagnosis Date    Anxiety     Depression     Melanoma      Past Surgical History:   Procedure Laterality Date    EXCISION OF MELANOMA      back of neck     Social History     Tobacco Use    Smoking status: Current Every Day Smoker     Packs/day: 0.00    Smokeless tobacco: Never Used   Substance Use Topics    Alcohol use: No    Drug use: Yes     Comment: heroin        Review of Systems   As noted in HPI in addition     Constitutional: Negative for chills, fatigue and fever.   Eyes: No double vision, No blurred vision  Neuro: No headaches, No dizziness  Respiratory: Negative for cough, shortness of breath  and wheezing.    Cardiovascular: Negative for chest pain. Negative for palpitations and leg swelling.   Gastrointestinal: Negative for abdominal pain, No melena, diarrhea, nausea and vomiting.   Genitourinary: Negative for dysuria and frequency, Negative for hematuria  Skin: Negative for bruising, Negative for edema or discoloration noted.   Endocrine: Negative for polyphagia, Negative for heat intolerance, Negative for cold intolerance  Psychiatric: Negative for depression, Negative for anxiety, Negative for memory loss  Musculoskeletal: Right arm is weaker then the left especially in the proximal muscles.         Objective        Vitals:    12/07/20 1554   BP: 108/62   Pulse: 93   Resp: 18       LIPIDS - LAST 2   Lab Results   Component Value Date    CHOL 88 (L) 11/09/2020    CHOL 160 09/16/2020    HDL 33 (L) 11/09/2020    HDL 43 09/16/2020    LDLCALC 41.2 (L) 11/09/2020    LDLCALC 99 09/16/2020    TRIG 69 11/09/2020    TRIG 90 09/16/2020    CHOLHDL 37.5 11/09/2020    CHOLHDL 3.7 09/16/2020       CBC - LAST 2  Lab Results   Component Value Date    WBC 11.60 11/11/2020    WBC 13.55 (H) 11/10/2020    RBC 4.57 (L) 11/11/2020    RBC 4.45 (L) 11/10/2020    HGB 13.6 (L) 11/11/2020    HGB 13.4 (L) 11/10/2020    HCT 40.3 11/11/2020    HCT 39.4 (L) 11/10/2020    MCV 88 11/11/2020    MCV 89 11/10/2020    MCH 29.8 11/11/2020    MCH 30.1 11/10/2020    MCHC 33.7 11/11/2020    MCHC 34.0 11/10/2020    RDW 13.2 11/11/2020    RDW 13.1 11/10/2020     11/11/2020     11/10/2020    MPV 9.4 11/11/2020    MPV 10.4 11/10/2020    GRAN 9.5 (H) 11/11/2020    GRAN 81.5 (H) 11/11/2020    LYMPH 1.1 11/11/2020    LYMPH 9.1 (L) 11/11/2020    MONO 1.0 11/11/2020    MONO 8.8 11/11/2020    BASO 0.02 11/11/2020    BASO 0.01 11/10/2020    NRBC 0 11/11/2020    NRBC 0 11/10/2020       CHEMISTRY & LIVER FUNCTION - LAST 2  Lab Results   Component Value Date     11/11/2020     11/10/2020    K 3.8 11/11/2020    K 4.0 11/10/2020      (H) 11/11/2020     11/10/2020    CO2 22 (L) 11/11/2020    CO2 26 11/10/2020    ANIONGAP 10 11/11/2020    ANIONGAP 7 (L) 11/10/2020    BUN 8 11/11/2020    BUN 8 11/10/2020    CREATININE 0.8 11/11/2020    CREATININE 0.8 11/10/2020    GLU 92 11/11/2020    GLU 85 11/10/2020    CALCIUM 8.8 11/11/2020    CALCIUM 8.6 (L) 11/10/2020    MG 1.8 11/11/2020    MG 2.2 11/10/2020    ALBUMIN 3.1 (L) 11/11/2020    ALBUMIN 3.2 (L) 11/10/2020    PROT 6.2 11/11/2020    PROT 6.1 11/10/2020    ALKPHOS 60 11/11/2020    ALKPHOS 68 11/10/2020    ALT 72 (H) 11/11/2020    ALT 81 (H) 11/10/2020     (H) 11/11/2020     (H) 11/10/2020    BILITOT 1.5 (H) 11/11/2020    BILITOT 1.2 (H) 11/10/2020        CARDIAC PROFILE - LAST 2  Lab Results   Component Value Date    CPK 2084 (H) 11/11/2020    CPK 3319 (H) 11/10/2020        COAGULATION - LAST 2  No results found for: LABPT, INR, APTT    ENDOCRINE & PSA - LAST 2  Lab Results   Component Value Date    HGBA1C 5.0 11/09/2020        ECHOCARDIOGRAM RESULTS  Results for orders placed during the hospital encounter of 11/08/20   Echo Color Flow Doppler? Yes; Bubble Contrast? Yes    Narrative · The left ventricle is normal in size with mildly decreased systolic   function. The estimated ejection fraction is 45%.  · There is mild left ventricular global hypokinesis.  · Normal left ventricular diastolic function.  · Normal right ventricular size with mildly reduced right ventricular   systolic function.  · There is moderate pulmonary hypertension.  · The estimated PA systolic pressure is 51 mmHg.  · Normal central venous pressure (3 mmHg).          CURRENT/PREVIOUS VISIT EKG  Results for orders placed or performed in visit on 12/07/20   IN OFFICE EKG 12-LEAD (to [place])    Collection Time: 12/07/20  3:56 PM    Narrative    Test Reason : I63.9,    Vent. Rate : 082 BPM     Atrial Rate : 082 BPM     P-R Int : 140 ms          QRS Dur : 086 ms      QT Int : 368 ms       P-R-T Axes : 058 061  057 degrees     QTc Int : 429 ms    Normal sinus rhythm  Normal ECG  When compared with ECG of 11-NOV-2020 09:39,  No significant change was found    Referred By: MIRTHA RANDLE           Confirmed By:      EXAMINATION:  MRI BRAIN WITHOUT CONTRAST     CLINICAL HISTORY:  Stroke, follow up;right arm weakness;     TECHNIQUE:  Multiplanar multisequence MR imaging of the brain was performed without contrast.     COMPARISON:  CT head from the same date.     FINDINGS:  The brain is normal in contour and morphology.  Two small foci of acute infarction are seen within the posterior limb of the right internal capsule and left cerebellar hemisphere. Ventricles are normal in size and configuration without evidence for hydrocephalus.  No intracranial hemorrhage or extraaxial fluid collection.  No mass or mass effect.  Flow voids are normal in appearance.     Visualized paranasal sinuses and mastoid air cells are clear.  Orbits appear normal.     Impression:     Two small foci of acute infarction involving the posterior limb of the right internal capsule and the left cerebellar hemisphere.  Consider possible embolic disease.     This report was flagged in Epic as abnormal.              PHYSICAL EXAM    GENERAL: well built, well nourished, well-developed in no apparent distress alert and oriented.   HEENT: Normocephalic. Pupils normal and conjunctivae normal.  Mucous membranes normal, no cyanosis or icterus, trachea central,no pallor or icterus is noted..   NECK: No JVD. No bruit..   THYROID: Thyroid not enlarged. No nodules present..   CARDIAC: Regular rate and rhythm. S1 is normal.S2 is normal.No gallops, clicks or murmurs noted at this time.  CHEST ANATOMY: normal.   LUNGS: Clear to auscultation. No wheezing or rhonchi..   ABDOMEN: Soft no masses or organomegaly.  No abdomen pulsations or bruits.  Normal bowel sounds. No pulsations and no masses felt, No guarding or rebound.   EXTREMITIES: No cyanosis, clubbing or edema noted at  this time., no calf tenderness bilaterally.   PERIPHERAL VASCULAR SYSTEM: Good palpable distal pulses.   CENTRAL NERVOUS SYSTEM: No focal motor or sensory deficits noted.   SKIN: Skin without lesions, moist, well perfused.   MUSCLE STRENGTH & TONE: No noteable weakness, atrophy or abnormal movement.     I HAVE REVIEWED :    The vital signs, nurses notes, and all the pertinent radiology and labs.        Current Outpatient Medications   Medication Instructions    aspirin (ECOTRIN) 81 mg, Oral, Daily    FLUoxetine 40 mg, Oral, Daily    QUEtiapine (SEROQUEL) 50 mg, Oral, Nightly          Assessment & Plan     Substance abuse  Patient is a long time opioid abuser and is currently not in any program. I have advised him to start AA.     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  Need to rule out cardiac source of emboli including vegetation or and or shunts.  This is probably secondary to Overdose.   Recommend him to continue ASA Daily  Check Lipid Profile and consider starting statin therapy.  He has fortunately a recovered fair amount of strength back at this time.    Depression  Per PCP  Continue Prozac and Seroquel  Consider Psyche eval    LV dysfunction  Patient has significant LV dysfunction ejection fraction of 45% on the echocardiogram with mild  global hypokinesis, this warrants further investigation.    Will obtain a stress Myoview to evaluate for any source of ischemia contributing to symptoms.  In the meantime are maintain low-fat low-salt diet.    Aspiration pneumonia  Clinically improved at this time.    Elevated LFTs  Abnormalities of liver profile noted in the acute setting.  Recommend to repeat the chemistry to evaluate the same and GI evaluation as need if it remains elevated          Follow up in about 4 weeks (around 1/4/2021).

## 2020-12-07 NOTE — ASSESSMENT & PLAN NOTE
Patient is a long time opioid abuser and is currently not in any program. I have advised him to start AA.

## 2020-12-07 NOTE — H&P (VIEW-ONLY)
Subjective:    Patient ID:  Garrick White is a 35 y.o. male patient here for evaluation Cerebrovascular Accident (recent stroke)      History of Present Illness:       Mr. White is a 35 year old male patient with a PMH significant for recent CVA. He overdosed and had a CVA. He denies any chest pain or SOB prior to the episode. He denies any recent issues besides right sided weakness to the arm. He is able to walk just fine. He relapsed two times and this was the second time. He is not currently in any programs.  His drug of choice was heroine. This is what he OD on. He was given atorvastatin and this cause a rash. He did not have a DANIEL while he was in the hospital. His CVA seems embolic on CT Scan.      HIS MOST RECENT ECHOCARDIOGRAM IS AS FOLLOWS:    Conclusion    · The left ventricle is normal in size with mildly decreased systolic function. The estimated ejection fraction is 45%.  · There is mild left ventricular global hypokinesis.  · Normal left ventricular diastolic function.  · Normal right ventricular size with mildly reduced right ventricular systolic function.  · There is moderate pulmonary hypertension.  · The estimated PA systolic pressure is 51 mmHg.  · Normal central venous pressure (3 mmHg).            Review of patient's allergies indicates:  No Known Allergies    Past Medical History:   Diagnosis Date    Anxiety     Depression     Melanoma      Past Surgical History:   Procedure Laterality Date    EXCISION OF MELANOMA      back of neck     Social History     Tobacco Use    Smoking status: Current Every Day Smoker     Packs/day: 0.00    Smokeless tobacco: Never Used   Substance Use Topics    Alcohol use: No    Drug use: Yes     Comment: heroin        Review of Systems   As noted in HPI in addition     Constitutional: Negative for chills, fatigue and fever.   Eyes: No double vision, No blurred vision  Neuro: No headaches, No dizziness  Respiratory: Negative for cough, shortness of breath  and wheezing.    Cardiovascular: Negative for chest pain. Negative for palpitations and leg swelling.   Gastrointestinal: Negative for abdominal pain, No melena, diarrhea, nausea and vomiting.   Genitourinary: Negative for dysuria and frequency, Negative for hematuria  Skin: Negative for bruising, Negative for edema or discoloration noted.   Endocrine: Negative for polyphagia, Negative for heat intolerance, Negative for cold intolerance  Psychiatric: Negative for depression, Negative for anxiety, Negative for memory loss  Musculoskeletal: Right arm is weaker then the left especially in the proximal muscles.         Objective        Vitals:    12/07/20 1554   BP: 108/62   Pulse: 93   Resp: 18       LIPIDS - LAST 2   Lab Results   Component Value Date    CHOL 88 (L) 11/09/2020    CHOL 160 09/16/2020    HDL 33 (L) 11/09/2020    HDL 43 09/16/2020    LDLCALC 41.2 (L) 11/09/2020    LDLCALC 99 09/16/2020    TRIG 69 11/09/2020    TRIG 90 09/16/2020    CHOLHDL 37.5 11/09/2020    CHOLHDL 3.7 09/16/2020       CBC - LAST 2  Lab Results   Component Value Date    WBC 11.60 11/11/2020    WBC 13.55 (H) 11/10/2020    RBC 4.57 (L) 11/11/2020    RBC 4.45 (L) 11/10/2020    HGB 13.6 (L) 11/11/2020    HGB 13.4 (L) 11/10/2020    HCT 40.3 11/11/2020    HCT 39.4 (L) 11/10/2020    MCV 88 11/11/2020    MCV 89 11/10/2020    MCH 29.8 11/11/2020    MCH 30.1 11/10/2020    MCHC 33.7 11/11/2020    MCHC 34.0 11/10/2020    RDW 13.2 11/11/2020    RDW 13.1 11/10/2020     11/11/2020     11/10/2020    MPV 9.4 11/11/2020    MPV 10.4 11/10/2020    GRAN 9.5 (H) 11/11/2020    GRAN 81.5 (H) 11/11/2020    LYMPH 1.1 11/11/2020    LYMPH 9.1 (L) 11/11/2020    MONO 1.0 11/11/2020    MONO 8.8 11/11/2020    BASO 0.02 11/11/2020    BASO 0.01 11/10/2020    NRBC 0 11/11/2020    NRBC 0 11/10/2020       CHEMISTRY & LIVER FUNCTION - LAST 2  Lab Results   Component Value Date     11/11/2020     11/10/2020    K 3.8 11/11/2020    K 4.0 11/10/2020      (H) 11/11/2020     11/10/2020    CO2 22 (L) 11/11/2020    CO2 26 11/10/2020    ANIONGAP 10 11/11/2020    ANIONGAP 7 (L) 11/10/2020    BUN 8 11/11/2020    BUN 8 11/10/2020    CREATININE 0.8 11/11/2020    CREATININE 0.8 11/10/2020    GLU 92 11/11/2020    GLU 85 11/10/2020    CALCIUM 8.8 11/11/2020    CALCIUM 8.6 (L) 11/10/2020    MG 1.8 11/11/2020    MG 2.2 11/10/2020    ALBUMIN 3.1 (L) 11/11/2020    ALBUMIN 3.2 (L) 11/10/2020    PROT 6.2 11/11/2020    PROT 6.1 11/10/2020    ALKPHOS 60 11/11/2020    ALKPHOS 68 11/10/2020    ALT 72 (H) 11/11/2020    ALT 81 (H) 11/10/2020     (H) 11/11/2020     (H) 11/10/2020    BILITOT 1.5 (H) 11/11/2020    BILITOT 1.2 (H) 11/10/2020        CARDIAC PROFILE - LAST 2  Lab Results   Component Value Date    CPK 2084 (H) 11/11/2020    CPK 3319 (H) 11/10/2020        COAGULATION - LAST 2  No results found for: LABPT, INR, APTT    ENDOCRINE & PSA - LAST 2  Lab Results   Component Value Date    HGBA1C 5.0 11/09/2020        ECHOCARDIOGRAM RESULTS  Results for orders placed during the hospital encounter of 11/08/20   Echo Color Flow Doppler? Yes; Bubble Contrast? Yes    Narrative · The left ventricle is normal in size with mildly decreased systolic   function. The estimated ejection fraction is 45%.  · There is mild left ventricular global hypokinesis.  · Normal left ventricular diastolic function.  · Normal right ventricular size with mildly reduced right ventricular   systolic function.  · There is moderate pulmonary hypertension.  · The estimated PA systolic pressure is 51 mmHg.  · Normal central venous pressure (3 mmHg).          CURRENT/PREVIOUS VISIT EKG  Results for orders placed or performed in visit on 12/07/20   IN OFFICE EKG 12-LEAD (to [place])    Collection Time: 12/07/20  3:56 PM    Narrative    Test Reason : I63.9,    Vent. Rate : 082 BPM     Atrial Rate : 082 BPM     P-R Int : 140 ms          QRS Dur : 086 ms      QT Int : 368 ms       P-R-T Axes : 058 061  057 degrees     QTc Int : 429 ms    Normal sinus rhythm  Normal ECG  When compared with ECG of 11-NOV-2020 09:39,  No significant change was found    Referred By: MIRTHA RANDLE           Confirmed By:      EXAMINATION:  MRI BRAIN WITHOUT CONTRAST     CLINICAL HISTORY:  Stroke, follow up;right arm weakness;     TECHNIQUE:  Multiplanar multisequence MR imaging of the brain was performed without contrast.     COMPARISON:  CT head from the same date.     FINDINGS:  The brain is normal in contour and morphology.  Two small foci of acute infarction are seen within the posterior limb of the right internal capsule and left cerebellar hemisphere. Ventricles are normal in size and configuration without evidence for hydrocephalus.  No intracranial hemorrhage or extraaxial fluid collection.  No mass or mass effect.  Flow voids are normal in appearance.     Visualized paranasal sinuses and mastoid air cells are clear.  Orbits appear normal.     Impression:     Two small foci of acute infarction involving the posterior limb of the right internal capsule and the left cerebellar hemisphere.  Consider possible embolic disease.     This report was flagged in Epic as abnormal.              PHYSICAL EXAM    GENERAL: well built, well nourished, well-developed in no apparent distress alert and oriented.   HEENT: Normocephalic. Pupils normal and conjunctivae normal.  Mucous membranes normal, no cyanosis or icterus, trachea central,no pallor or icterus is noted..   NECK: No JVD. No bruit..   THYROID: Thyroid not enlarged. No nodules present..   CARDIAC: Regular rate and rhythm. S1 is normal.S2 is normal.No gallops, clicks or murmurs noted at this time.  CHEST ANATOMY: normal.   LUNGS: Clear to auscultation. No wheezing or rhonchi..   ABDOMEN: Soft no masses or organomegaly.  No abdomen pulsations or bruits.  Normal bowel sounds. No pulsations and no masses felt, No guarding or rebound.   EXTREMITIES: No cyanosis, clubbing or edema noted at  this time., no calf tenderness bilaterally.   PERIPHERAL VASCULAR SYSTEM: Good palpable distal pulses.   CENTRAL NERVOUS SYSTEM: No focal motor or sensory deficits noted.   SKIN: Skin without lesions, moist, well perfused.   MUSCLE STRENGTH & TONE: No noteable weakness, atrophy or abnormal movement.     I HAVE REVIEWED :    The vital signs, nurses notes, and all the pertinent radiology and labs.        Current Outpatient Medications   Medication Instructions    aspirin (ECOTRIN) 81 mg, Oral, Daily    FLUoxetine 40 mg, Oral, Daily    QUEtiapine (SEROQUEL) 50 mg, Oral, Nightly          Assessment & Plan     Substance abuse  Patient is a long time opioid abuser and is currently not in any program. I have advised him to start AA.     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  Need to rule out cardiac source of emboli including vegetation or and or shunts.  This is probably secondary to Overdose.   Recommend him to continue ASA Daily  Check Lipid Profile and consider starting statin therapy.  He has fortunately a recovered fair amount of strength back at this time.    Depression  Per PCP  Continue Prozac and Seroquel  Consider Psyche eval    LV dysfunction  Patient has significant LV dysfunction ejection fraction of 45% on the echocardiogram with mild  global hypokinesis, this warrants further investigation.    Will obtain a stress Myoview to evaluate for any source of ischemia contributing to symptoms.  In the meantime are maintain low-fat low-salt diet.    Aspiration pneumonia  Clinically improved at this time.    Elevated LFTs  Abnormalities of liver profile noted in the acute setting.  Recommend to repeat the chemistry to evaluate the same and GI evaluation as need if it remains elevated          Follow up in about 4 weeks (around 1/4/2021).

## 2020-12-07 NOTE — LETTER
December 7, 2020      Ilana Nichole NP  [address]  [address]  [place] [address]           [facility] [place]  [address]  [place] [address]  Phone: [phone]  Fax: [phone]          Patient: Garrick White   MR Number: [id number]   YOB: 1985   Date of Visit: 12/7/2020       Dear Ilana Nichole:    Thank you for referring Garrick White to me for evaluation. Attached you will find relevant portions of my assessment and plan of care.    If you have questions, please do not hesitate to call me. I look forward to following Garrick White along with you.    Sincerely,    Kyle Phelps MD    Enclosure  CC:  No Recipients    If you would like to receive this communication electronically, please contact [email or web address] or [phone] to request more information on [email or web address] Link access.    For providers and/or their staff who would like to refer a patient to [facility], please contact us through our one-stop-shop provider referral line, [facility], at [phone].    If you feel you have received this communication in error or would no longer like to receive these types of communications, please e-mail [email or web address]

## 2020-12-07 NOTE — ASSESSMENT & PLAN NOTE
Abnormalities of liver profile noted in the acute setting.  Recommend to repeat the chemistry to evaluate the same and GI evaluation as need if it remains elevated

## 2020-12-09 ENCOUNTER — CLINICAL SUPPORT (OUTPATIENT)
Dept: REHABILITATION | Facility: HOSPITAL | Age: 35
End: 2020-12-09
Payer: MEDICAID

## 2020-12-09 ENCOUNTER — TELEPHONE (OUTPATIENT)
Dept: CARDIOLOGY | Facility: CLINIC | Age: 35
End: 2020-12-09

## 2020-12-09 DIAGNOSIS — M62.82 NON-TRAUMATIC RHABDOMYOLYSIS: Primary | ICD-10-CM

## 2020-12-09 DIAGNOSIS — I63.9 CEREBROVASCULAR ACCIDENT (CVA), UNSPECIFIED MECHANISM: Primary | ICD-10-CM

## 2020-12-09 DIAGNOSIS — Z01.818 PREOP EXAMINATION: ICD-10-CM

## 2020-12-09 PROCEDURE — 97110 THERAPEUTIC EXERCISES: CPT | Mod: PN,CQ

## 2020-12-09 NOTE — TELEPHONE ENCOUNTER
He has another procedure scheduled the same day and has waited for a month. He asked if his alonso can be moved so he doesn't have to reschedule. Alonso moved to 12/17   Please follow up with your PCP if no improvement within 5-7 days. Go directly to the ER for any acute worsening of symptoms.    · Return to clinic or follow up with PCP for further evaluation if symptoms are not improved within 3-5 days  · Go to ER if you d eat 4-8 oz twice daily. Choose a product with the [employer]'s seal such as Nola Chaz, or Fage. · Probiotics: Capsule/granule forms such as Florastor, Florajen (refrigerated), or Culturelle.

## 2020-12-09 NOTE — PROGRESS NOTES
Physical Therapy Treatment Note     Name: Garrick White  Clinic Number: [id number]    Therapy Diagnosis: R arm weakness  Physician: Jaclyn Christensen MD    Visit Date: 12/9/2020    Physician Orders: eval and treat  Medical Diagnosis: non traumatic rhabdomyolysis  Evaluation Date: 11/20/20  Authorization Period Expiration: 1/8//21  Plan of Care Certification Period: 1/5/21  Visit #/Visits authorized: 3/ 12     Time In: 1322  Time Out: 1400  Total Billable Time: 38 minutes    Precautions: Standard    Subjective     Pt reports:  No pain. States he feels like he is lifting his shoulder more now, but unable to demo.   He was compliant with home exercise program.  Response to previous treatment: no soreness  Functional change: none    Pain: 0/10  Location: N/A      Objective     Garrick received therapeutic exercises to develop strength and endurance for 38 minutes including  :  Scapular mobs: retraction 20; protraction 20; elevation/depression 20; shoulder circles 10/10  AAROM R shoulder all planes of full motion. supine  Isometrics R sh:  Adduction 20; abduction 20; ext 20; flex 20; IR 20; ER 20 with manual resistance  Supine rhythmic stabilization R shoulder all planes  Wand Exercises Flex 20 supine  Supine R shoulder abd with skate 10/2  L SL R shoulder flexion with skate 10/2  Standing rows RTB 10 reps    Home Exercises Provided and Patient Education Provided     Education provided:   - Educated pt that he/she may feel soreness after session.      Written Home Exercises Provided: Patient instructed to cont prior HEP.  Exercises were reviewed and Garrick was able to demonstrate them prior to the end of the session.  Garrick demonstrated good  understanding of the education provided.     See EMR under Patient Instructions for exercises provided 11/20/20.    Assessment     Good response to addition of gravity-eliminated flexion and abd with skate on mat.   Garrick is progressing towards his goals.   Pt prognosis is  Good.     Pt will continue to benefit from skilled outpatient physical therapy to address the deficits listed in the problem list box on initial evaluation, provide pt/family education and to maximize pt's level of independence in the home and community environment.     Pt's spiritual, cultural and educational needs considered and pt agreeable to plan of care and goals.     Anticipated barriers to physical therapy: none    Goals: (ongoing)  Short Term Goals: 2 weeks   1. Patient will report compliance to HEP.  2. Patient will be able to lift B shoulder forward flexion 2#s dowel shoulder level.  3. Increased R sh IR 65 deg     Long Term Goals: 6 weeks (ongoing)  1. Improve motor strength R sh 3-/5 gross  2. Patient will demonstrate Active sh flex in supine against gravity to 90 deg  3. Patient will have 90% R sh IR  4. Patient will demonstrate ability to place R hand on head to comb hair.      Plan     Supine lying Ex's to minimize gravity with overhead function.  Cont POC.      Tamara Lu, PTA

## 2020-12-09 NOTE — TELEPHONE ENCOUNTER
----- Message from Dasha Jane sent at 12/9/2020  2:42 PM CST -----  Regarding: appt  please give patient a call they need to reschedule the DANIEL [phone]

## 2020-12-14 ENCOUNTER — HOSPITAL ENCOUNTER (OUTPATIENT)
Dept: PREADMISSION TESTING | Facility: HOSPITAL | Age: 35
Discharge: HOME | End: 2020-12-14
Attending: INTERNAL MEDICINE
Payer: MEDICAID

## 2020-12-14 VITALS — BODY MASS INDEX: 21.47 KG/M2 | WEIGHT: 150 LBS | HEIGHT: 70 IN

## 2020-12-14 DIAGNOSIS — I63.9 CEREBROVASCULAR ACCIDENT (CVA), UNSPECIFIED MECHANISM: ICD-10-CM

## 2020-12-14 DIAGNOSIS — Z01.818 PREOP EXAMINATION: ICD-10-CM

## 2020-12-14 LAB
ALBUMIN SERPL BCP-MCNC: 4.8 G/DL (ref 3.5–5.2)
ALP SERPL-CCNC: 62 U/L (ref 55–135)
ALT SERPL W/O P-5'-P-CCNC: 30 U/L (ref 10–44)
ANION GAP SERPL CALC-SCNC: 9 MMOL/L (ref 8–16)
ANION GAP SERPL CALC-SCNC: 9 MMOL/L (ref 8–16)
AST SERPL-CCNC: 24 U/L (ref 10–40)
BASOPHILS # BLD AUTO: 0.06 K/UL (ref 0–0.2)
BASOPHILS NFR BLD: 0.7 % (ref 0–1.9)
BILIRUB SERPL-MCNC: 1.5 MG/DL (ref 0.1–1)
BUN SERPL-MCNC: 16 MG/DL (ref 6–20)
BUN SERPL-MCNC: 16 MG/DL (ref 6–20)
CALCIUM SERPL-MCNC: 9.7 MG/DL (ref 8.7–10.5)
CALCIUM SERPL-MCNC: 9.7 MG/DL (ref 8.7–10.5)
CHLORIDE SERPL-SCNC: 100 MMOL/L (ref 95–110)
CHLORIDE SERPL-SCNC: 100 MMOL/L (ref 95–110)
CO2 SERPL-SCNC: 29 MMOL/L (ref 23–29)
CO2 SERPL-SCNC: 29 MMOL/L (ref 23–29)
CREAT SERPL-MCNC: 0.9 MG/DL (ref 0.5–1.4)
CREAT SERPL-MCNC: 0.9 MG/DL (ref 0.5–1.4)
DIFFERENTIAL METHOD: NORMAL
EOSINOPHIL # BLD AUTO: 0.1 K/UL (ref 0–0.5)
EOSINOPHIL NFR BLD: 0.7 % (ref 0–8)
ERYTHROCYTE [DISTWIDTH] IN BLOOD BY AUTOMATED COUNT: 12.5 % (ref 11.5–14.5)
EST. GFR  (AFRICAN AMERICAN): >60 ML/MIN/1.73 M^2
EST. GFR  (AFRICAN AMERICAN): >60 ML/MIN/1.73 M^2
EST. GFR  (NON AFRICAN AMERICAN): >60 ML/MIN/1.73 M^2
EST. GFR  (NON AFRICAN AMERICAN): >60 ML/MIN/1.73 M^2
GLUCOSE SERPL-MCNC: 103 MG/DL (ref 70–110)
GLUCOSE SERPL-MCNC: 103 MG/DL (ref 70–110)
HCT VFR BLD AUTO: 45.9 % (ref 40–54)
HGB BLD-MCNC: 15.3 G/DL (ref 14–18)
IMM GRANULOCYTES # BLD AUTO: 0.02 K/UL (ref 0–0.04)
IMM GRANULOCYTES NFR BLD AUTO: 0.2 % (ref 0–0.5)
LYMPHOCYTES # BLD AUTO: 1.9 K/UL (ref 1–4.8)
LYMPHOCYTES NFR BLD: 22.5 % (ref 18–48)
MCH RBC QN AUTO: 29.6 PG (ref 27–31)
MCHC RBC AUTO-ENTMCNC: 33.3 G/DL (ref 32–36)
MCV RBC AUTO: 89 FL (ref 82–98)
MONOCYTES # BLD AUTO: 0.5 K/UL (ref 0.3–1)
MONOCYTES NFR BLD: 5.4 % (ref 4–15)
NEUTROPHILS # BLD AUTO: 6 K/UL (ref 1.8–7.7)
NEUTROPHILS NFR BLD: 70.5 % (ref 38–73)
NRBC BLD-RTO: 0 /100 WBC
PLATELET # BLD AUTO: 205 K/UL (ref 150–350)
PMV BLD AUTO: 10.4 FL (ref 9.2–12.9)
POTASSIUM SERPL-SCNC: 3.8 MMOL/L (ref 3.5–5.1)
POTASSIUM SERPL-SCNC: 3.8 MMOL/L (ref 3.5–5.1)
PROT SERPL-MCNC: 7.6 G/DL (ref 6–8.4)
RBC # BLD AUTO: 5.17 M/UL (ref 4.6–6.2)
SODIUM SERPL-SCNC: 138 MMOL/L (ref 136–145)
SODIUM SERPL-SCNC: 138 MMOL/L (ref 136–145)
WBC # BLD AUTO: 8.55 K/UL (ref 3.9–12.7)

## 2020-12-14 PROCEDURE — 85025 COMPLETE CBC W/AUTO DIFF WBC: CPT

## 2020-12-14 PROCEDURE — U0003 INFECTIOUS AGENT DETECTION BY NUCLEIC ACID (DNA OR RNA); SEVERE ACUTE RESPIRATORY SYNDROME CORONAVIRUS 2 (SARS-COV-2) (CORONAVIRUS DISEASE [COVID-19]), AMPLIFIED PROBE TECHNIQUE, MAKING USE OF HIGH THROUGHPUT TECHNOLOGIES AS DESCRIBED BY CMS-2020-01-R: HCPCS

## 2020-12-14 PROCEDURE — 93010 ELECTROCARDIOGRAM REPORT: CPT | Mod: ,,, | Performed by: INTERNAL MEDICINE

## 2020-12-14 PROCEDURE — 36415 COLL VENOUS BLD VENIPUNCTURE: CPT

## 2020-12-14 PROCEDURE — 93010 EKG 12-LEAD: ICD-10-PCS | Mod: ,,, | Performed by: INTERNAL MEDICINE

## 2020-12-14 PROCEDURE — 93005 ELECTROCARDIOGRAM TRACING: CPT | Performed by: INTERNAL MEDICINE

## 2020-12-14 PROCEDURE — 80053 COMPREHEN METABOLIC PANEL: CPT

## 2020-12-14 RX ORDER — POTASSIUM CHLORIDE 20 MEQ/1
20 TABLET, EXTENDED RELEASE ORAL ONCE
Status: CANCELLED | OUTPATIENT
Start: 2020-12-14 | End: 2020-12-14

## 2020-12-15 ENCOUNTER — PROCEDURE VISIT (OUTPATIENT)
Dept: NEUROLOGY | Facility: CLINIC | Age: 35
End: 2020-12-15
Payer: MEDICAID

## 2020-12-15 ENCOUNTER — LAB VISIT (OUTPATIENT)
Dept: LAB | Facility: OTHER | Age: 35
End: 2020-12-15
Attending: PSYCHIATRY & NEUROLOGY
Payer: MEDICAID

## 2020-12-15 ENCOUNTER — OFFICE VISIT (OUTPATIENT)
Dept: NEUROLOGY | Facility: CLINIC | Age: 35
End: 2020-12-15
Payer: MEDICAID

## 2020-12-15 DIAGNOSIS — R74.8 HYPERCKEMIA: Primary | ICD-10-CM

## 2020-12-15 DIAGNOSIS — R29.898 RIGHT ARM WEAKNESS: ICD-10-CM

## 2020-12-15 DIAGNOSIS — G54.0 BRACHIAL PLEXUS DISORDERS: ICD-10-CM

## 2020-12-15 DIAGNOSIS — R74.8 HYPERCKEMIA: ICD-10-CM

## 2020-12-15 LAB
CK SERPL-CCNC: 57 U/L (ref 20–200)
SARS-COV-2 RNA RESP QL NAA+PROBE: NOT DETECTED

## 2020-12-15 PROCEDURE — 99214 PR OFFICE/OUTPT VISIT, EST, LEVL IV, 30-39 MIN: ICD-10-PCS | Mod: S$PBB,,, | Performed by: PSYCHIATRY & NEUROLOGY

## 2020-12-15 PROCEDURE — 95913 NRV CNDJ TEST 13/> STUDIES: CPT | Mod: 26,S$PBB,, | Performed by: PSYCHIATRY & NEUROLOGY

## 2020-12-15 PROCEDURE — 99999 PR PBB SHADOW E&M-EST. PATIENT-LVL II: ICD-10-PCS | Mod: PBBFAC,,, | Performed by: PSYCHIATRY & NEUROLOGY

## 2020-12-15 PROCEDURE — 36415 COLL VENOUS BLD VENIPUNCTURE: CPT

## 2020-12-15 PROCEDURE — 95886 PR EMG COMPLETE, W/ NERVE CONDUCTION STUDIES, 5+ MUSCLES: ICD-10-PCS | Mod: 26,S$PBB,, | Performed by: PSYCHIATRY & NEUROLOGY

## 2020-12-15 PROCEDURE — 95886 MUSC TEST DONE W/N TEST COMP: CPT | Mod: 26,S$PBB,, | Performed by: PSYCHIATRY & NEUROLOGY

## 2020-12-15 PROCEDURE — 95913 PR NERVE CONDUCTION STUDY; 13 OR MORE STUDIES: ICD-10-PCS | Mod: 26,S$PBB,, | Performed by: PSYCHIATRY & NEUROLOGY

## 2020-12-15 PROCEDURE — 99212 OFFICE O/P EST SF 10 MIN: CPT | Mod: PBBFAC | Performed by: PSYCHIATRY & NEUROLOGY

## 2020-12-15 PROCEDURE — 82550 ASSAY OF CK (CPK): CPT

## 2020-12-15 PROCEDURE — 99999 PR PBB SHADOW E&M-EST. PATIENT-LVL II: CPT | Mod: PBBFAC,,, | Performed by: PSYCHIATRY & NEUROLOGY

## 2020-12-15 PROCEDURE — 99214 OFFICE O/P EST MOD 30 MIN: CPT | Mod: S$PBB,,, | Performed by: PSYCHIATRY & NEUROLOGY

## 2020-12-15 PROCEDURE — 95860 NEEDLE EMG 1 EXTREMITY: CPT | Mod: PBBFAC | Performed by: PSYCHIATRY & NEUROLOGY

## 2020-12-15 PROCEDURE — 95913 NRV CNDJ TEST 13/> STUDIES: CPT | Mod: PBBFAC | Performed by: PSYCHIATRY & NEUROLOGY

## 2020-12-15 RX ORDER — IBUPROFEN 200 MG
1 TABLET ORAL DAILY
Qty: 30 PATCH | Refills: 3 | Status: SHIPPED | OUTPATIENT
Start: 2020-12-15 | End: 2020-12-23 | Stop reason: SDUPTHER

## 2020-12-15 NOTE — PATIENT INSTRUCTIONS
"Thank you for coming.    I recommend the following"  MRI of your brachial plexus with and without contrast  Blood work today - for muscle enzyme level  Continue Aspirin  Stop smoking   Nicotine patch   Dermatology referral   "

## 2020-12-15 NOTE — PROGRESS NOTES
NEUROLOGY  Outpatient Initial Clinic visit note    [address] [address]  [phone] (office) / [phone] ( (fax)    Patient Name:  Garrick White  :  1985  MR #:  [id number]  Acct #:  [id number]    Date of Neurology Visit: 12/15/2020  Name of Neurologist: Devika Steinberg MD    Other Physicians:  Ilana Nichole NP (Primary Care Physician); Devika Steinberg MD (Referring)      Chief Complaint: Acute right arm weakness- currently improving     History of Present Illness (HPI):  Garrick White is a 35 y.o. male with previous h/o malignant melanoma ( s/p removal) presents to the neurology clinic post hospital discharge for cerebral infarcts and right upper extremity weakness. The patient is accompanied by his mother.    He remembers waking up the day of his admission at [facility] (2020) with right arm numbness and weakness. He had been doing drugs and also had a bruise on his head. He is not sure what happened to cause the bruise on his head. Upon awakening he asked a passerby to help him since he could not move the right arm the passerby drove him to [facility]. The numbness was present in his right neck, supraclavicular region into his right upper arm.     Since his discharge- the numbness is somewhat better. Lifting his arm above his head is an issue. He is unable to extend his arms in front of him but overall right upper extremity weakness is improved.     The patient states that his symptoms are similar to a previous episode that happened after he underwent surgery for melanoma removal a few years ago over the posterior cervical region. He states he had similar numbness and weakness involving the right upper extremity. When he brought this up with his surgeon he was told it was unclear why that happened. Over time his symptoms improved significantly in the next few months ( he thinks he may not have regained 100% of his strength).     He  continues to smoke. He denies illicit drug abuse.      Of note he had two tiny infarcts one in the left cerebellar hemisphere and one in the right internal capsule which would not explain his right upper extremity weakness and numbness, given the multiple infarcts he is being evaluated with a DANIEL by Cardiology    Duration: 6 weeks   Location: right upper extremity   Intensity: moderate   Aggravating factors: none   Relieving factors: PT  Frequency: 2nd episode ( had a similar episode few years ago)  Timin2020  Associated symptoms: weakness and numbness     Hospital discharge summary (2020 admission)  HPI:   35-year-old male with history of anxiety and depression, IV heroin use, came in complaining of right arm weakness.  Patient is not able to give much history.  He states I am very uncomfortable and do not feel good.  He is specially uncomfortable  not able to move his right arm.  Patient is not able to answer all the questions.  He did say that he think he overdosed.  He does not remember falling.  He was not able to tell me whether he drinks alcohol or not but admitted to prior physician.  His workup in the ED showed rhabdomyolysis with acute kidney injury and hyperkalemia of 7.0.  He also had elevated lactate peak of 4.0.  Patient was given normal saline and was started on bicarb drip.  His repeat potassium was 5.9.  His EKG did not show acute changes.  CT head and CT cervical spine were okay.  Chest x-ray showed interstitial prominence in the left lower lung.  The patient is being admitted to ICU for further management.          Hospital Course:   Mri showed Two small foci of acute infarction involving the posterior limb of the right internal capsule and the left cerebellar hemisphere.  Consider possible embolic disease. MRA head and neck were normal.Vega and ck levels improving.Echo showed ef 45%, mild global hypokinesis.  He was in sinus rhythm.  He was started on aspirin 81 mg daily.  Neurology  was consulted.  PT OT recommended outpatient therapy.  He was discharged on low-dose statin due to recent rhabdo with follow-up of  his CK level as outpatient.  MRI C-spine was normal.  Patient will follow-up with neurology for outpatient nerve conduction studies for his right arm weakness.  Plan of care was discussed with the patient and his mother.  Patient will be staying in [place] with his parents.    Treatment to date:   PT for right upper extremity weakness  ASA for stroke prevention     Review of Systems:    General: Weight gain: No, Weight Loss: No, Fatigue: No,   Fever: No, Chills: No, Night Sweats: No, Insomnia: No, Excessive sleeping: No   Respiratory:  Cough: No, Shortness of Breath: No,   Wheezing: No, Excessive Snoring: No, Coughing up blood: No  Endocrine: Heat Intolerance: No, Cold Intolerance: No,   Excessive Thirst: No, Excessive Hunger: No,   Eyes:  Blurred Vision: No, Double Vision: No,   Light Sensitivity: No, Eye pain: No  Musculoskeletal: Muscle Aches/Pain: Yes, Joint Pain/Swelling: No, Muscle Cramps: No, Muscle Weakness: Yes, Neck Pain: No, Back Pain: No   Neurological: Difficulty Walking/Falls: No, Headache Migraine: No, Dizziness/Vertigo: No, Fainting: No, Difficulty with Speech: No, Weakness: No, Tingling/Numbness: Yes, Tremors: No, Memory Problems: No, Seizures: No, Difficulty Swallowing: No, Altered Taste: No.  Cardiovascular: Chest Pain: No, Shortness of Breath: No,   Palpitations: No,  Gastrointestinal: Nausea/Vomiting: No, Constipation: No, Diarrhea: No, Bloody Stools: No   Psych/Cog:  Depression: No, Anxiety: No, Hallucinations: No, Problems Concentrating: No  : Frequent Urination: No, Incontinence: No, Blood of Urine: No, Urinary Infections: No,   ENT:Hearing Loss: No, Earache: No, Ringing in Ears: No,   Facial Pain: No, Chronic Congestion: No   Immune: Seasonal Allergies: No, Hives and/or Rashes: No  The remainder of the review of twelve body systems was reviewed and  normal.    Past Medical, Surgical, Family & Social History:   Past Medical History:   Diagnosis Date    Anxiety     CVA (cerebral vascular accident)     Depression     Melanoma        Home Medications:     Current Outpatient Medications:     aspirin (ECOTRIN) 81 MG EC tablet, Take 1 tablet (81 mg total) by mouth once daily., Disp: , Rfl: 0    FLUoxetine 40 MG capsule, Take 1 capsule (40 mg total) by mouth once daily., Disp: 30 capsule, Rfl: 3    QUEtiapine (SEROQUEL) 50 MG tablet, Take 1 tablet (50 mg total) by mouth every evening., Disp: 30 tablet, Rfl: 11    Physical Examination:  There were no vitals taken for this visit.    GENERAL:  General appearance: Well, non-toxic appearing.  No apparent distress.  Fundi exam: normal.  Neck: supple.  Carotid auscultation: normal.  Heart auscultation: normal.  Peripheral pulses: normal.  Extremities: small mobile cystic lesion 1 cm x 1 cm palpated over the upper mid-thoracic region     MENTAL STATUS:  Alertness, attention span & concentration: normal.  Language: normal.  Orientation to self, place & time:  normal.  Memory, recent & remote: normal.  Fund of knowledge: normal.      SPEECH:  Clear and fluent.  Follows complex commands.    CRANIAL NERVES:  Cranial Nerves II-XII were examined.  II - Visual fields: normal.  III, IV, VI: PERRL, EOMI, No ptosis, No nystagmus.  V - Facial sensation: normal.  VII - Face symmetry & mobility: normal.  VIII - Hearing: normal.  IX, X - Palate: mobile & midline.  XI - Shoulder shrug: normal.  XII - Tongue protrusion: normal.    GROSS MOTOR:  Gait & station: normal.  Tone: normal.  Abnormal movements: none.  Finger-nose & Heel-knee-shin: normal.  Rapid alternating movements & drift: normal.  Romberg: absent    MUSCLE STRENGTH:     Fascics Atrophy RIGHT    LEFT Atrophy Fascics     5 Neck Ext. 5       5 Neck Flex 5       3 Deltoids 5       2 Infraspinatus 5       3 Rhomboid 5       4+ Biceps 5       5 Triceps 5       5 Forearm.Pr. 5        5 Wrist Ext. 5       5 Wrist Flex 5       5 Finger Ext. 5       5 Finger Flex 5       5 FPL 5       5 Inteross. 5                         5 Iliopsoas 5       5 Hip Abduct 5       5 Hip Adduct 5       5 Quads 5       5 Hams 5       5 Dorsiflex 5       5 Plantar Flex 5       5 Ankle Sean 5       5 Ankle Invert 5       5 Toe Ext. 5       5 Toe Flex 5                         Supraspinatus 3/5   Rhomboid, Supraspinatus and Infraspinatus atrophy noted     REFLEXES:    RIGHT Reflex   LEFT   1+ Biceps 2+   2+ Brachiorad. 2+   2+ Triceps 2+        2+ Patellar 2+   2+ Ankle 2+        Down PLANTAR Down     SENSORY:  Light touch: Normal throughout.  Sharp touch: Diminished over supracalvilau region and infracalvicular region 5 cms below the clavicular region on the right otherwise intact   Vibration: Normal throughout.  Temperature: diminished over right arm and supra/ infraclavicular region as mentioned above   Joint Position: Normal throughout.    Diagnostic Data Reviewed:    MRI brain: 11/8/2020     Impression:     Two small foci of acute infarction involving the posterior limb of the right internal capsule and the left cerebellar hemisphere.  Consider possible embolic disease.    MRA neck without contrast:     FINDINGS:  Extracranial carotid circulation: No hemodynamically significant stenosis, aneurysmal dilatation, or dissection.     Extracranial vertebral circulation: No hemodynamically significant stenosis, aneurysmal dilatation, or dissection.     Intracranial Arteries: No focal high-grade stenosis, occlusion, or aneurysm.     Impression:     No significant arterial abnormalities.        Assessment and Plan:    Garrick White is a 35 y.o. male with previous h/o malignant melanoma ( s/p removal) presents to the neurology clinic post hospital discharge for cerebral infarcts and right upper extremity weakness. The patient is accompanied by his mother.    He has proximal right upper extremity weakness restricted to  the right upper trunk muscles. EMG/NCS performed today revealed asymmetric right radial sensory and lateral antebrachial cutaneous amplitude responses. Additionally he had acute or ongoing denervation with chronic reinnervation changes from some right upper trunk innervated muscles.  Given the acute nature of symptoms and concomittant cerebral infarcts would like to evaluate for right axillary artery thrombus with CTA chest. Another consideration would be traumatic vs idiopathic brachial plexopathy    Assessment:  1. Right upper trunk plexopathy  2. Multiple cerebral infarcts- ? Etiology ( drug abuse at the time of infarction), DANIEL pending       Plan:  1. MRI right brachial plexus with and without contrast   2. CTA chest with contrast   3. Continue ASA 81 mg daily   4. DANIEL per Cardiology  5. Consider hypercoagulable work up if DANIEL is negative   6. Patient counseled to quit smoking, he is motivated wants me to prescribe nicotine patches  7. Continue PT  8. Dermatology referral for cystic lesion over back  9. Follow up CK- elevated during his admission        The patient will return to clinic in 3 months.    Important to note, also  has a past medical history of Anxiety, CVA (cerebral vascular accident), Depression, and Melanoma.         Devika Steinberg MD  Medicine-Neurology, Clinical Neurophysiology    Time Spent:  40 minutes spent face-to-face, >50% spent advising about: counseling and/or coordination of care    This note was created with voice recognition software.  Grammatical, syntax and spelling errors may be inevitable.

## 2020-12-16 ENCOUNTER — CLINICAL SUPPORT (OUTPATIENT)
Dept: REHABILITATION | Facility: HOSPITAL | Age: 35
End: 2020-12-16
Payer: MEDICAID

## 2020-12-16 DIAGNOSIS — R29.898 WEAKNESS OF RIGHT UPPER EXTREMITY: Primary | ICD-10-CM

## 2020-12-16 PROCEDURE — 97110 THERAPEUTIC EXERCISES: CPT | Mod: PN,CQ

## 2020-12-16 NOTE — PROGRESS NOTES
Physical Therapy Treatment Note     Name: Garrick White  Clinic Number: [id number]    Therapy Diagnosis:   Encounter Diagnosis   Name Primary?    Weakness of right upper extremity Yes     Physician: Jaclyn Christensen MD    Visit Date: 12/16/2020    Physician Orders: eval and treat  Medical Diagnosis: non traumatic rhabdomyolysis  Evaluation Date: 11/20/20  Authorization Period Expiration: 1/8//21  Plan of Care Certification Period: 1/5/21  Visit #/Visits authorized: 3/12     Time In: 1320  Time Out: 1400  Total Billable Time: 40 minutes    Precautions: Standard    Subjective     Pt reports: Doing better today.  He was compliant with home exercise program.  Response to previous treatment: no problem  Functional change: improved mobility    Pain: 0/10  Location:       Objective     Garrick received therapeutic exercises to R UE to develop strength, endurance, ROM, flexibility and posture for 40 minutes including:    OHP 4' flexion  Supine cane x20: Serratus punch B     Flexion B     Horizontal abd/add B     ER on R  Stand cane x20: Extension B     IR behind back B     Abduction on R  Rows 2x10 Green Tband B  Wall wipes x10 flexion R/L    Home Exercises Provided and Patient Education Provided     Education provided:   - Rest breaks with all activities.    Written Home Exercises Provided: Patient instructed to cont prior HEP.  Exercises were reviewed and Garrick was able to demonstrate them prior to the end of the session.  Garrick demonstrated good  understanding of the education provided.     See EMR under Patient Instructions for exercises provided prior visit.    Assessment     Patient tolerated activities with rest breaks.    Garrick is progressing well towards his goals.   Pt prognosis is Good.     Pt will continue to benefit from skilled outpatient physical therapy to address the deficits listed in the problem list box on initial evaluation, provide pt/family education and to maximize pt's level of  independence in the home and community environment.     Pt's spiritual, cultural and educational needs considered and pt agreeable to plan of care and goals.     Anticipated barriers to physical therapy: none    Goals:     Short Term Goals: 2 weeks   1. Patient will report compliance to HEP.  2. Patient will be able to lift B shoulder forward flexion 2#s dowel shoulder level.  3. Increased R sh IR 65 deg     Long Term Goals: 6 weeks (ongoing)  1. Improve motor strength R sh 3-/5 gross  2. Patient will demonstrate Active sh flex in supine against gravity to 90 deg  3. Patient will have 90% R sh IR  4. Patient will demonstrate ability to place R hand on head to comb hair.         Plan     Continue with POC to increase activities as patient tolerates.    Noa Castillo, PTA

## 2020-12-17 ENCOUNTER — ANESTHESIA EVENT (OUTPATIENT)
Dept: CARDIOLOGY | Facility: HOSPITAL | Age: 35
End: 2020-12-17
Payer: MEDICAID

## 2020-12-17 ENCOUNTER — HOSPITAL ENCOUNTER (OUTPATIENT)
Facility: HOSPITAL | Age: 35
Discharge: HOME | End: 2020-12-17
Attending: INTERNAL MEDICINE | Admitting: INTERNAL MEDICINE
Payer: MEDICAID

## 2020-12-17 ENCOUNTER — ANESTHESIA (OUTPATIENT)
Dept: CARDIOLOGY | Facility: HOSPITAL | Age: 35
End: 2020-12-17
Payer: MEDICAID

## 2020-12-17 ENCOUNTER — CLINICAL SUPPORT (OUTPATIENT)
Dept: CARDIOLOGY | Facility: HOSPITAL | Age: 35
End: 2020-12-17
Attending: INTERNAL MEDICINE
Payer: MEDICAID

## 2020-12-17 VITALS
OXYGEN SATURATION: 99 % | RESPIRATION RATE: 15 BRPM | BODY MASS INDEX: 21.47 KG/M2 | DIASTOLIC BLOOD PRESSURE: 62 MMHG | TEMPERATURE: 98 F | HEART RATE: 65 BPM | SYSTOLIC BLOOD PRESSURE: 99 MMHG | HEIGHT: 70 IN | WEIGHT: 150 LBS

## 2020-12-17 VITALS — HEIGHT: 70 IN | BODY MASS INDEX: 21.47 KG/M2 | WEIGHT: 149.94 LBS

## 2020-12-17 DIAGNOSIS — I63.9 CVA (CEREBRAL VASCULAR ACCIDENT): ICD-10-CM

## 2020-12-17 DIAGNOSIS — I63.9 CEREBROVASCULAR ACCIDENT (CVA), UNSPECIFIED MECHANISM: ICD-10-CM

## 2020-12-17 DIAGNOSIS — I51.9 LV DYSFUNCTION: ICD-10-CM

## 2020-12-17 LAB
BSA FOR ECHO PROCEDURE: 1.83 M2
OHS CV EVENT MONITOR DAY: 0
OHS CV HOLTER LENGTH DECIMAL HOURS: 24
OHS CV HOLTER LENGTH HOURS: 24
OHS CV HOLTER LENGTH MINUTES: 0

## 2020-12-17 PROCEDURE — 93312 ECHO TRANSESOPHAGEAL: CPT | Mod: 26,,, | Performed by: INTERNAL MEDICINE

## 2020-12-17 PROCEDURE — 37000009 HC ANESTHESIA EA ADD 15 MINS: Performed by: INTERNAL MEDICINE

## 2020-12-17 PROCEDURE — 93320 DOPPLER ECHO COMPLETE: CPT | Mod: 26,,, | Performed by: INTERNAL MEDICINE

## 2020-12-17 PROCEDURE — 27000284 HC CANNULA NASAL: Performed by: ANESTHESIOLOGY

## 2020-12-17 PROCEDURE — 37000008 HC ANESTHESIA 1ST 15 MINUTES: Performed by: INTERNAL MEDICINE

## 2020-12-17 PROCEDURE — 93321 DOPPLER ECHO F-UP/LMTD STD: CPT

## 2020-12-17 PROCEDURE — 93312 TRANSESOPHAGEAL ECHO (TEE) W/ POSSIBLE CARDIOVERSION: ICD-10-PCS | Mod: 26,,, | Performed by: INTERNAL MEDICINE

## 2020-12-17 PROCEDURE — 01922 ANES N-INVAS IMG/RADJ THER: CPT | Performed by: INTERNAL MEDICINE

## 2020-12-17 PROCEDURE — 93325 DOPPLER ECHO COLOR FLOW MAPG: CPT | Mod: 26,,, | Performed by: INTERNAL MEDICINE

## 2020-12-17 PROCEDURE — 93325 TRANSESOPHAGEAL ECHO (TEE) W/ POSSIBLE CARDIOVERSION: ICD-10-PCS | Mod: 26,,, | Performed by: INTERNAL MEDICINE

## 2020-12-17 PROCEDURE — 25000003 PHARM REV CODE 250: Performed by: INTERNAL MEDICINE

## 2020-12-17 PROCEDURE — 93320 TRANSESOPHAGEAL ECHO (TEE) W/ POSSIBLE CARDIOVERSION: ICD-10-PCS | Mod: 26,,, | Performed by: INTERNAL MEDICINE

## 2020-12-17 PROCEDURE — 63600175 PHARM REV CODE 636 W HCPCS: Performed by: NURSE ANESTHETIST, CERTIFIED REGISTERED

## 2020-12-17 PROCEDURE — 27000671 HC TUBING MICROBORE EXT: Performed by: ANESTHESIOLOGY

## 2020-12-17 PROCEDURE — 25000003 PHARM REV CODE 250: Performed by: NURSE ANESTHETIST, CERTIFIED REGISTERED

## 2020-12-17 RX ORDER — SODIUM CHLORIDE 9 MG/ML
INJECTION, SOLUTION INTRAVENOUS CONTINUOUS PRN
Status: DISCONTINUED | OUTPATIENT
Start: 2020-12-17 | End: 2020-12-17

## 2020-12-17 RX ORDER — SODIUM CHLORIDE 9 MG/ML
INJECTION, SOLUTION INTRAVENOUS CONTINUOUS
Status: DISCONTINUED | OUTPATIENT
Start: 2020-12-17 | End: 2020-12-17 | Stop reason: HOSPADM

## 2020-12-17 RX ORDER — POTASSIUM CHLORIDE 20 MEQ/1
20 TABLET, EXTENDED RELEASE ORAL ONCE
Status: COMPLETED | OUTPATIENT
Start: 2020-12-17 | End: 2020-12-17

## 2020-12-17 RX ORDER — PROPOFOL 10 MG/ML
VIAL (ML) INTRAVENOUS
Status: DISCONTINUED | OUTPATIENT
Start: 2020-12-17 | End: 2020-12-17

## 2020-12-17 RX ADMIN — PROPOFOL 80 MG: 10 INJECTION, EMULSION INTRAVENOUS at 10:12

## 2020-12-17 RX ADMIN — PROPOFOL 20 MG: 10 INJECTION, EMULSION INTRAVENOUS at 10:12

## 2020-12-17 RX ADMIN — SODIUM CHLORIDE 10 ML/HR: 0.9 INJECTION, SOLUTION INTRAVENOUS at 08:12

## 2020-12-17 RX ADMIN — PROPOFOL 40 MG: 10 INJECTION, EMULSION INTRAVENOUS at 10:12

## 2020-12-17 RX ADMIN — SODIUM CHLORIDE: 0.9 INJECTION, SOLUTION INTRAVENOUS at 10:12

## 2020-12-17 RX ADMIN — POTASSIUM CHLORIDE 20 MEQ: 20 TABLET, EXTENDED RELEASE ORAL at 08:12

## 2020-12-17 NOTE — ANESTHESIA PREPROCEDURE EVALUATION
12/17/2020  Garrick White is a 35 y.o., male.    Patient Active Problem List   Diagnosis    Non-traumatic rhabdomyolysis    Lactic acidosis    Aspiration pneumonia    Polysubstance (excluding opioids) dependence, daily use    Weakness of right upper extremity    Elevated LFTs    Stroke    Tobacco use    Hypophosphatemia    Depression    Substance abuse    LV dysfunction       Past Surgical History:   Procedure Laterality Date    EXCISION OF MELANOMA      back of neck        Tobacco Use:  The patient  reports that he has been smoking. He has been smoking about 1.00 pack per day. He has never used smokeless tobacco.     Results for orders placed or performed during the hospital encounter of 12/14/20   EKG 12-lead    Collection Time: 12/14/20 11:39 AM    Narrative    Test Reason : I63.9,Z01.818,    Vent. Rate : 076 BPM     Atrial Rate : 076 BPM     P-R Int : 136 ms          QRS Dur : 080 ms      QT Int : 374 ms       P-R-T Axes : 069 086 054 degrees     QTc Int : 420 ms    Normal sinus rhythm  Minimal voltage criteria for LVH, may be normal variant  Borderline Abnormal ECG  When compared with ECG of 07-DEC-2020 15:56,  No significant change was found    Referred By:             Confirmed By:              Lab Results   Component Value Date    WBC 8.55 12/14/2020    HGB 15.3 12/14/2020    HCT 45.9 12/14/2020    MCV 89 12/14/2020     12/14/2020     BMP  Lab Results   Component Value Date     12/14/2020     12/14/2020    K 3.8 12/14/2020    K 3.8 12/14/2020     12/14/2020     12/14/2020    CO2 29 12/14/2020    CO2 29 12/14/2020    BUN 16 12/14/2020    BUN 16 12/14/2020    CREATININE 0.9 12/14/2020    CREATININE 0.9 12/14/2020    CALCIUM 9.7 12/14/2020    CALCIUM 9.7 12/14/2020    ANIONGAP 9 12/14/2020    ANIONGAP 9 12/14/2020     12/14/2020     12/14/2020     GLU 92 11/11/2020       Results for orders placed during the hospital encounter of 11/08/20   Echo Color Flow Doppler? Yes; Bubble Contrast? Yes    Narrative · The left ventricle is normal in size with mildly decreased systolic   function. The estimated ejection fraction is 45%.  · There is mild left ventricular global hypokinesis.  · Normal left ventricular diastolic function.  · Normal right ventricular size with mildly reduced right ventricular   systolic function.  · There is moderate pulmonary hypertension.  · The estimated PA systolic pressure is 51 mmHg.  · Normal central venous pressure (3 mmHg).           FINDINGS:  Increasing consolidation in the left lower lobe.  New consolidation in the right lower lobe.  Cardiac silhouette is unchanged.  Lung volumes are slightly low.  No acute osseous findings.     Impression:     Worsening consolidation in the left lower lobe with development of a new consolidation in the right lower lobe concerning for worsening pneumonia.        Electronically signed by: Jay Duncan MD  Date:                                            11/10/2020  Time:                                           17:04        FINDINGS:  Alignment: Normal.     Vertebrae: Normal marrow signal. No fracture.     Discs: Normal height and signal.     Cord: Normal.     Skull base and craniocervical junction: Normal.     There are no significant degenerative findings at any cervical level.  No significant nerve root compression.     Paraspinal muscles & soft tissues: Unremarkable.     Impression:     Essentially normal cervical MRI.    Anesthesia Evaluation    I have reviewed the Patient Summary Reports.    I have reviewed the Nursing Notes. I have reviewed the NPO Status.   I have reviewed the Medications.     Review of Systems  Anesthesia Hx:  No problems with previous Anesthesia  Denies Family Hx of Anesthesia complications.    Social:  Alcohol Use, Smoker History of Substance abuse Illicit Drug  Use: Types of drugs include Heroin,   Hematology/Oncology:         -- Cancer in past history: surgery  Oncology Comments: Melanoma has excision, has MRI of shoulder scheduled to r/o re-occurrence of the melanoma.     Cardiovascular:   ECG has been reviewed. LV dysfunction   Pulmonary:   Pneumonia Aspiration pneumonia   Hepatic/GI:   Elevated LFTs   Neurological:   CVA (right upper extremity and shoulder), residual symptoms Neuromuscular Disease, Weakness of right upper extremity   Psych:   Psychiatric History anxiety depression          Physical Exam  General:  Well nourished    Airway/Jaw/Neck:  Airway Findings: Mouth Opening: Normal Tongue: Normal  General Airway Assessment: Adult  Mallampati: III  Improves to II with phonation.  TM Distance: Normal, at least 6 cm  Jaw/Neck Findings:  Neck ROM: Normal ROM      Dental:  Dental Findings: Molar caps   Chest/Lungs:  Chest/Lungs Findings: Clear to auscultation, Normal Respiratory Rate     Heart/Vascular:  Heart Findings: Rate: Normal  Rhythm: Regular Rhythm  Sounds: Normal        Mental Status:  Mental Status Findings:  Cooperative, Alert and Oriented         Anesthesia Plan  Type of Anesthesia, risks & benefits discussed:  Anesthesia Type:  MAC  Patient's Preference:   Intra-op Monitoring Plan: standard ASA monitors  Intra-op Monitoring Plan Comments:   Post Op Pain Control Plan: per primary service following discharge from PACU  Post Op Pain Control Plan Comments:   Induction:    Beta Blocker:  Patient is not currently on a Beta-Blocker (No further documentation required).       Informed Consent: Patient understands risks and agrees with Anesthesia plan.  Questions answered. Anesthesia consent signed with patient.  ASA Score: 3     Day of Surgery Review of History & Physical:        Anesthesia Plan Notes: MAC wtth Propofol        Ready For Surgery From Anesthesia Perspective.

## 2020-12-17 NOTE — ANESTHESIA POSTPROCEDURE EVALUATION
Anesthesia Post Evaluation    Patient: Garrick White    Procedure(s) Performed: Procedure(s) (LRB):  Transesophageal echo (DANIEL) intra-procedure log documentation (N/A)    Final Anesthesia Type: MAC      Patient location: [facility].  Patient participation: Yes- Able to Participate  Level of consciousness: awake and alert  Post-procedure vital signs: reviewed and stable  Pain management: adequate  Airway patency: patent    PONV status at discharge: No PONV  Anesthetic complications: no      Cardiovascular status: blood pressure returned to baseline and stable  Respiratory status: unassisted and room air  Hydration status: euvolemic  Follow-up not needed.          Vitals Value Taken Time   /75 12/17/20 1130   Temp 36.5 °C (97.7 °F) 12/17/20 1027   Pulse 58 12/17/20 1136   Resp 17 12/17/20 1136   SpO2 100 % 12/17/20 1136   Vitals shown include unvalidated device data.      No case tracking events are documented in the log.      Pain/Mustapha Score: Mustapha Score: 10 (12/17/2020 10:31 AM)

## 2020-12-17 NOTE — TRANSFER OF CARE
"Anesthesia Transfer of Care Note    Patient: Garrick White    Procedure(s) Performed: Procedure(s) (LRB):  Transesophageal echo (DANIEL) intra-procedure log documentation (N/A)    Patient location: Other: [facility]    Anesthesia Type: MAC    Transport from OR: Transported from OR on 2-3 L/min O2 by NC with adequate spontaneous ventilation    Post pain: adequate analgesia    Post assessment: no apparent anesthetic complications and tolerated procedure well    Post vital signs: stable    Level of consciousness: awake, alert and oriented    Nausea/Vomiting: no nausea/vomiting    Complications: none    Transfer of care protocol was followedComments: Pt to remain in heart center to be recovered. Pt in NAD. VSS. Report to RN per protocol.      Last vitals:   Visit Vitals  BP 97/60 (BP Location: Left arm, Patient Position: Lying)   Pulse 66   Temp 36.5 °C (97.7 °F) (Oral)   Resp 18   Ht 5' 10" (1.778 m)   Wt 68 kg (150 lb)   SpO2 100%   BMI 21.52 kg/m²     "

## 2020-12-17 NOTE — NURSING
Patient meets criteria for discharge. Reviewed discharge information with patient and mother at bedside. Both verbalize understanding. All questions answered. Transported to mother's vehicle via wheelchair.

## 2020-12-18 ENCOUNTER — PATIENT MESSAGE (OUTPATIENT)
Dept: FAMILY MEDICINE | Facility: CLINIC | Age: 35
End: 2020-12-18

## 2020-12-18 NOTE — INTERVAL H&P NOTE
The patient has been examined and the H&P has been reviewed:    I concur with the findings and no changes have occurred since H&P was written.    Anesthesia/Surgery risks, benefits and alternative options discussed and understood by patient/family.          Active Hospital Problems    Diagnosis  POA    Stroke [I63.9]  Yes     Priority: Low      Resolved Hospital Problems   No resolved problems to display.

## 2020-12-21 RX ORDER — IBUPROFEN 200 MG
1 TABLET ORAL DAILY
Qty: 28 PATCH | Refills: 0 | Status: SHIPPED | OUTPATIENT
Start: 2020-12-21 | End: 2022-08-09

## 2020-12-22 ENCOUNTER — PATIENT MESSAGE (OUTPATIENT)
Dept: FAMILY MEDICINE | Facility: CLINIC | Age: 35
End: 2020-12-22

## 2020-12-22 ENCOUNTER — TELEPHONE (OUTPATIENT)
Dept: CARDIOLOGY | Facility: CLINIC | Age: 35
End: 2020-12-22

## 2020-12-22 NOTE — TELEPHONE ENCOUNTER
roddy spoke with patient to see if he was agreeable to getting a loop implant, he is not wanting to do it at this time. He declined

## 2020-12-22 NOTE — TELEPHONE ENCOUNTER
----- Message from Elsa Olvera NP sent at 12/21/2020  5:45 PM CST -----    ----- Message -----  From: Devika Steinberg MD  Sent: 12/16/2020  12:51 PM CST  To: Kyle Phelps MD    Hi Dr. Phelps,    My name is Devika Steinberg, I am a neurologist at [facility] [employer]. I was wondering if you could help with Linq monitor placement on this patient given the cortical nature of his cerebral infarcts    Thanks!    Devika

## 2020-12-23 ENCOUNTER — PATIENT MESSAGE (OUTPATIENT)
Dept: FAMILY MEDICINE | Facility: CLINIC | Age: 35
End: 2020-12-23

## 2020-12-23 RX ORDER — IBUPROFEN 200 MG
1 TABLET ORAL DAILY
Qty: 30 PATCH | Refills: 3 | Status: SHIPPED | OUTPATIENT
Start: 2020-12-23 | End: 2021-01-22

## 2020-12-24 NOTE — PROCEDURES
Procedures     EMG/NCS  was performed in the lab. Report scanned into chart.          Devika Steinberg MD  Medicine-Neurology, Clinical Neurophysiology

## 2020-12-29 ENCOUNTER — HOSPITAL ENCOUNTER (OUTPATIENT)
Dept: RADIOLOGY | Facility: HOSPITAL | Age: 35
Discharge: HOME | End: 2020-12-29
Attending: PSYCHIATRY & NEUROLOGY
Payer: MEDICAID

## 2020-12-29 ENCOUNTER — PATIENT MESSAGE (OUTPATIENT)
Dept: FAMILY MEDICINE | Facility: CLINIC | Age: 35
End: 2020-12-29

## 2020-12-29 DIAGNOSIS — G54.0 BRACHIAL PLEXUS DISORDERS: ICD-10-CM

## 2020-12-29 PROCEDURE — 71275 CT ANGIOGRAPHY CHEST: CPT | Mod: 26,,, | Performed by: RADIOLOGY

## 2020-12-29 PROCEDURE — 25500020 PHARM REV CODE 255: Performed by: PSYCHIATRY & NEUROLOGY

## 2020-12-29 PROCEDURE — 71275 CTA CHEST NON CORONARY: ICD-10-PCS | Mod: 26,,, | Performed by: RADIOLOGY

## 2020-12-29 PROCEDURE — 71275 CT ANGIOGRAPHY CHEST: CPT | Mod: TC

## 2020-12-29 RX ADMIN — IOHEXOL 100 ML: 350 INJECTION, SOLUTION INTRAVENOUS at 10:12

## 2020-12-30 ENCOUNTER — TELEPHONE (OUTPATIENT)
Dept: REHABILITATION | Facility: HOSPITAL | Age: 35
End: 2020-12-30

## 2020-12-30 ENCOUNTER — HOSPITAL ENCOUNTER (OUTPATIENT)
Dept: RADIOLOGY | Facility: CLINIC | Age: 35
Discharge: HOME | End: 2020-12-30
Attending: INTERNAL MEDICINE
Payer: MEDICAID

## 2020-12-30 ENCOUNTER — HOSPITAL ENCOUNTER (OUTPATIENT)
Dept: CARDIOLOGY | Facility: CLINIC | Age: 35
Discharge: HOME | End: 2020-12-30
Attending: INTERNAL MEDICINE
Payer: MEDICAID

## 2020-12-30 DIAGNOSIS — I51.9 LV DYSFUNCTION: ICD-10-CM

## 2020-12-30 DIAGNOSIS — I63.9 CEREBROVASCULAR ACCIDENT (CVA), UNSPECIFIED MECHANISM: ICD-10-CM

## 2020-12-30 PROCEDURE — A9502 STRESS TEST WITH MYOCARDIAL PERFUSION (CUPID ONLY): ICD-10-PCS | Mod: S$GLB,,, | Performed by: INTERNAL MEDICINE

## 2020-12-30 PROCEDURE — 78452 STRESS TEST WITH MYOCARDIAL PERFUSION (CUPID ONLY): ICD-10-PCS | Mod: S$GLB,,, | Performed by: INTERNAL MEDICINE

## 2020-12-30 PROCEDURE — 93015 CV STRESS TEST SUPVJ I&R: CPT | Mod: S$GLB,,, | Performed by: INTERNAL MEDICINE

## 2020-12-30 PROCEDURE — 78452 HT MUSCLE IMAGE SPECT MULT: CPT | Mod: S$GLB,,, | Performed by: INTERNAL MEDICINE

## 2020-12-30 PROCEDURE — 93015 STRESS TEST WITH MYOCARDIAL PERFUSION (CUPID ONLY): ICD-10-PCS | Mod: S$GLB,,, | Performed by: INTERNAL MEDICINE

## 2020-12-30 PROCEDURE — A9502 TC99M TETROFOSMIN: HCPCS | Mod: S$GLB,,, | Performed by: INTERNAL MEDICINE

## 2020-12-31 ENCOUNTER — PATIENT MESSAGE (OUTPATIENT)
Dept: FAMILY MEDICINE | Facility: CLINIC | Age: 35
End: 2020-12-31

## 2020-12-31 LAB
CV STRESS BASE HR: 71 BPM
DIASTOLIC BLOOD PRESSURE: 72 MMHG
EJECTION FRACTION- HIGH: 65 %
END DIASTOLIC INDEX-HIGH: 158 ML/M2
END DIASTOLIC INDEX-LOW: 94 ML/M2
END SYSTOLIC INDEX-HIGH: 71 ML/M2
END SYSTOLIC INDEX-LOW: 33 ML/M2
NUC STRESS DIASTOLIC VOLUME INDEX: 103
NUC STRESS EJECTION FRACTION: 59 %
NUC STRESS SYSTOLIC VOLUME INDEX: 42
OHS CV CPX 1 MINUTE RECOVERY HEART RATE: 142 BPM
OHS CV CPX 85 PERCENT MAX PREDICTED HEART RATE MALE: 158
OHS CV CPX ESTIMATED METS: 13.7
OHS CV CPX MAX PREDICTED HEART RATE: 186
OHS CV CPX PATIENT IS FEMALE: 0
OHS CV CPX PATIENT IS MALE: 1
OHS CV CPX PEAK DIASTOLIC BLOOD PRESSURE: 72 MMHG
OHS CV CPX PEAK HEAR RATE: 157 BPM
OHS CV CPX PEAK RATE PRESSURE PRODUCT: NORMAL
OHS CV CPX PEAK SYSTOLIC BLOOD PRESSURE: 134 MMHG
OHS CV CPX PERCENT MAX PREDICTED HEART RATE ACHIEVED: 84
OHS CV CPX RATE PRESSURE PRODUCT PRESENTING: 6390
RETIRED EF AND QEF - SEE NOTES: 53 %
STRESS ECHO POST EXERCISE DUR MIN: 12 MINUTES
STRESS ECHO POST EXERCISE DUR SEC: 56 SECONDS
SYSTOLIC BLOOD PRESSURE: 90 MMHG

## 2021-06-15 ENCOUNTER — PATIENT MESSAGE (OUTPATIENT)
Dept: FAMILY MEDICINE | Facility: CLINIC | Age: 36
End: 2021-06-15

## 2021-09-07 ENCOUNTER — PATIENT MESSAGE (OUTPATIENT)
Dept: FAMILY MEDICINE | Facility: CLINIC | Age: 36
End: 2021-09-07

## 2021-09-08 ENCOUNTER — PATIENT MESSAGE (OUTPATIENT)
Dept: FAMILY MEDICINE | Facility: CLINIC | Age: 36
End: 2021-09-08

## 2021-09-09 ENCOUNTER — PATIENT MESSAGE (OUTPATIENT)
Dept: FAMILY MEDICINE | Facility: CLINIC | Age: 36
End: 2021-09-09

## 2021-09-13 ENCOUNTER — PATIENT MESSAGE (OUTPATIENT)
Dept: FAMILY MEDICINE | Facility: CLINIC | Age: 36
End: 2021-09-13

## 2021-09-13 RX ORDER — SILDENAFIL 50 MG/1
50 TABLET, FILM COATED ORAL
Qty: 9 TABLET | Refills: 0 | Status: CANCELLED | OUTPATIENT
Start: 2021-09-13 | End: 2022-09-13

## 2021-09-13 RX ORDER — SILDENAFIL 50 MG/1
50 TABLET, FILM COATED ORAL DAILY PRN
Qty: 9 TABLET | Refills: 0 | Status: SHIPPED | OUTPATIENT
Start: 2021-09-13 | End: 2022-08-09

## 2021-09-17 ENCOUNTER — PATIENT MESSAGE (OUTPATIENT)
Dept: FAMILY MEDICINE | Facility: CLINIC | Age: 36
End: 2021-09-17

## 2021-10-22 ENCOUNTER — PATIENT MESSAGE (OUTPATIENT)
Dept: FAMILY MEDICINE | Facility: CLINIC | Age: 36
End: 2021-10-22

## 2022-06-17 ENCOUNTER — HOSPITAL ENCOUNTER (EMERGENCY)
Facility: HOSPITAL | Age: 37
Discharge: HOME | End: 2022-06-18
Attending: EMERGENCY MEDICINE
Payer: MEDICAID

## 2022-06-17 DIAGNOSIS — S92.251A CLOSED DISPLACED FRACTURE OF NAVICULAR BONE OF RIGHT FOOT, INITIAL ENCOUNTER: Primary | ICD-10-CM

## 2022-06-17 DIAGNOSIS — S92.001A CLOSED DISPLACED FRACTURE OF RIGHT CALCANEUS, UNSPECIFIED PORTION OF CALCANEUS, INITIAL ENCOUNTER: ICD-10-CM

## 2022-06-17 DIAGNOSIS — S99.911A ANKLE INJURY, RIGHT, INITIAL ENCOUNTER: ICD-10-CM

## 2022-06-17 PROCEDURE — 29505 PR APPLY LONG LEG SPLINT: ICD-10-PCS | Mod: RT,,, | Performed by: EMERGENCY MEDICINE

## 2022-06-17 PROCEDURE — 96375 TX/PRO/DX INJ NEW DRUG ADDON: CPT

## 2022-06-17 PROCEDURE — 29505 APPLICATION LONG LEG SPLINT: CPT | Mod: RT,,, | Performed by: EMERGENCY MEDICINE

## 2022-06-17 PROCEDURE — 96372 THER/PROPH/DIAG INJ SC/IM: CPT | Performed by: STUDENT IN AN ORGANIZED HEALTH CARE EDUCATION/TRAINING PROGRAM

## 2022-06-17 PROCEDURE — 99285 EMERGENCY DEPT VISIT HI MDM: CPT | Mod: 25

## 2022-06-17 PROCEDURE — 96374 THER/PROPH/DIAG INJ IV PUSH: CPT

## 2022-06-17 PROCEDURE — 99285 EMERGENCY DEPT VISIT HI MDM: CPT | Mod: 25,,, | Performed by: EMERGENCY MEDICINE

## 2022-06-17 PROCEDURE — 99285 PR EMERGENCY DEPT VISIT,LEVEL V: ICD-10-PCS | Mod: 25,,, | Performed by: EMERGENCY MEDICINE

## 2022-06-17 PROCEDURE — 63600175 PHARM REV CODE 636 W HCPCS: Performed by: STUDENT IN AN ORGANIZED HEALTH CARE EDUCATION/TRAINING PROGRAM

## 2022-06-17 RX ORDER — KETOROLAC TROMETHAMINE 30 MG/ML
10 INJECTION, SOLUTION INTRAMUSCULAR; INTRAVENOUS
Status: DISCONTINUED | OUTPATIENT
Start: 2022-06-17 | End: 2022-06-17

## 2022-06-17 RX ORDER — KETAMINE HCL IN 0.9 % NACL 50 MG/5 ML
2 SYRINGE (ML) INTRAVENOUS ONCE
Status: DISCONTINUED | OUTPATIENT
Start: 2022-06-18 | End: 2022-06-17

## 2022-06-17 RX ORDER — PROPOFOL 10 MG/ML
1.5 VIAL (ML) INTRAVENOUS ONCE
Status: DISCONTINUED | OUTPATIENT
Start: 2022-06-18 | End: 2022-06-17

## 2022-06-17 RX ORDER — KETOROLAC TROMETHAMINE 30 MG/ML
15 INJECTION, SOLUTION INTRAMUSCULAR; INTRAVENOUS
Status: COMPLETED | OUTPATIENT
Start: 2022-06-17 | End: 2022-06-17

## 2022-06-17 RX ADMIN — KETOROLAC TROMETHAMINE 15 MG: 30 INJECTION, SOLUTION INTRAMUSCULAR; INTRAVENOUS at 11:06

## 2022-06-17 NOTE — Clinical Note
"Garrick White (Jonathan) was seen and treated in our emergency department on 6/17/2022.  He may return to work on 06/22/2022.       If you have any questions or concerns, please don't hesitate to call.      TAMIKO Francois RN    "

## 2022-06-18 VITALS
BODY MASS INDEX: 21.52 KG/M2 | TEMPERATURE: 98 F | RESPIRATION RATE: 15 BRPM | DIASTOLIC BLOOD PRESSURE: 60 MMHG | SYSTOLIC BLOOD PRESSURE: 115 MMHG | OXYGEN SATURATION: 98 % | HEART RATE: 91 BPM | WEIGHT: 150 LBS

## 2022-06-18 PROCEDURE — 25000003 PHARM REV CODE 250: Performed by: STUDENT IN AN ORGANIZED HEALTH CARE EDUCATION/TRAINING PROGRAM

## 2022-06-18 PROCEDURE — 63600175 PHARM REV CODE 636 W HCPCS: Performed by: EMERGENCY MEDICINE

## 2022-06-18 PROCEDURE — S4991 NICOTINE PATCH NONLEGEND: HCPCS | Performed by: STUDENT IN AN ORGANIZED HEALTH CARE EDUCATION/TRAINING PROGRAM

## 2022-06-18 RX ORDER — IBUPROFEN 800 MG/1
800 TABLET ORAL EVERY 6 HOURS PRN
Qty: 20 TABLET | Refills: 0 | Status: SHIPPED | OUTPATIENT
Start: 2022-06-18 | End: 2022-08-09

## 2022-06-18 RX ORDER — IBUPROFEN 200 MG
1 TABLET ORAL
Status: DISCONTINUED | OUTPATIENT
Start: 2022-06-18 | End: 2022-06-18 | Stop reason: HOSPADM

## 2022-06-18 RX ORDER — ACETAMINOPHEN 500 MG
500 TABLET ORAL EVERY 4 HOURS PRN
Qty: 42 TABLET | Refills: 0 | Status: SHIPPED | OUTPATIENT
Start: 2022-06-18 | End: 2022-06-25

## 2022-06-18 RX ORDER — HYDROMORPHONE HYDROCHLORIDE 1 MG/ML
1 INJECTION, SOLUTION INTRAMUSCULAR; INTRAVENOUS; SUBCUTANEOUS
Status: COMPLETED | OUTPATIENT
Start: 2022-06-18 | End: 2022-06-18

## 2022-06-18 RX ORDER — KETAMINE HCL IN 0.9 % NACL 50 MG/5 ML
20 SYRINGE (ML) INTRAVENOUS ONCE
Status: COMPLETED | OUTPATIENT
Start: 2022-06-18 | End: 2022-06-18

## 2022-06-18 RX ORDER — ALPRAZOLAM 0.25 MG/1
1 TABLET ORAL
Status: DISCONTINUED | OUTPATIENT
Start: 2022-06-18 | End: 2022-06-18

## 2022-06-18 RX ORDER — TRAZODONE HYDROCHLORIDE 50 MG/1
50 TABLET ORAL
Status: COMPLETED | OUTPATIENT
Start: 2022-06-18 | End: 2022-06-18

## 2022-06-18 RX ORDER — HYDROXYZINE PAMOATE 25 MG/1
25 CAPSULE ORAL
Status: COMPLETED | OUTPATIENT
Start: 2022-06-18 | End: 2022-06-18

## 2022-06-18 RX ADMIN — TRAZODONE HYDROCHLORIDE 50 MG: 50 TABLET ORAL at 04:06

## 2022-06-18 RX ADMIN — HYDROXYZINE PAMOATE 25 MG: 25 CAPSULE ORAL at 12:06

## 2022-06-18 RX ADMIN — HYDROMORPHONE HYDROCHLORIDE 1 MG: 1 INJECTION, SOLUTION INTRAMUSCULAR; INTRAVENOUS; SUBCUTANEOUS at 06:06

## 2022-06-18 RX ADMIN — Medication 20 MG: at 05:06

## 2022-06-18 RX ADMIN — Medication 1 PATCH: at 12:06
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
## 2022-06-18 NOTE — DISCHARGE INSTRUCTIONS
Do not bear weight on the affected leg until you follow-up for further instructions with podiatry.

## 2022-06-18 NOTE — ED NOTES
Adult Physical Assessment  LOC: Garrick White, 37 y.o. male verified via two identifiers.  The patient is awake, alert, oriented and speaking appropriately at this time.  APPEARANCE: Patient resting comfortably and appears to be in no acute distress at this time. Patient is clean and well groomed, patient's clothing is properly fastened.  SKIN:The skin is warm and dry, color consistent with ethnicity, patient has normal skin turgor and moist mucus membranes, skin intact, no breakdown or brusing noted.    RESPIRATORY: Airway is open and patent, respirations are spontaneous, patient has a normal effort and rate, no accessory muscle use noted.  CARDIAC: Patient has a normal rate and rhythm, no periphreal edema noted in any extremity, capillary refill < 3 seconds in all extremities  ABDOMEN: Soft and non tender to palpation, no abdominal distention noted. Bowel sounds present in all four quadrants.  NEUROLOGIC: Eyes open spontaneously, behavior appropriate to situation, follows commands, facial expression symmetrical, bilateral hand grasp equal and even, purposeful motor response noted, normal sensation in all extremities when touched with a finger.
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
## 2022-06-22 ENCOUNTER — OFFICE VISIT (OUTPATIENT)
Dept: PODIATRY | Facility: CLINIC | Age: 37
End: 2022-06-22
Payer: MEDICAID

## 2022-06-22 VITALS
HEART RATE: 92 BPM | HEIGHT: 70 IN | SYSTOLIC BLOOD PRESSURE: 122 MMHG | WEIGHT: 155 LBS | BODY MASS INDEX: 22.19 KG/M2 | DIASTOLIC BLOOD PRESSURE: 77 MMHG

## 2022-06-22 DIAGNOSIS — S92.211A CLOSED DISPLACED FRACTURE OF CUBOID OF RIGHT FOOT, INITIAL ENCOUNTER: ICD-10-CM

## 2022-06-22 DIAGNOSIS — S92.251A CLOSED DISPLACED FRACTURE OF NAVICULAR BONE OF RIGHT FOOT, INITIAL ENCOUNTER: ICD-10-CM

## 2022-06-22 DIAGNOSIS — S92.001A CLOSED DISPLACED FRACTURE OF RIGHT CALCANEUS, UNSPECIFIED PORTION OF CALCANEUS, INITIAL ENCOUNTER: ICD-10-CM

## 2022-06-22 DIAGNOSIS — F19.10 SUBSTANCE ABUSE: ICD-10-CM

## 2022-06-22 DIAGNOSIS — Z72.0 TOBACCO USE: ICD-10-CM

## 2022-06-22 DIAGNOSIS — S93.301A: Primary | ICD-10-CM

## 2022-06-22 PROCEDURE — 3074F SYST BP LT 130 MM HG: CPT | Mod: CPTII,,, | Performed by: PODIATRIST

## 2022-06-22 PROCEDURE — 3074F PR MOST RECENT SYSTOLIC BLOOD PRESSURE < 130 MM HG: ICD-10-PCS | Mod: CPTII,,, | Performed by: PODIATRIST

## 2022-06-22 PROCEDURE — 99999 PR PBB SHADOW E&M-EST. PATIENT-LVL IV: CPT | Mod: PBBFAC,,, | Performed by: PODIATRIST

## 2022-06-22 PROCEDURE — 1159F PR MEDICATION LIST DOCUMENTED IN MEDICAL RECORD: ICD-10-PCS | Mod: CPTII,,, | Performed by: PODIATRIST

## 2022-06-22 PROCEDURE — 99204 PR OFFICE/OUTPT VISIT, NEW, LEVL IV, 45-59 MIN: ICD-10-PCS | Mod: S$PBB,,, | Performed by: PODIATRIST

## 2022-06-22 PROCEDURE — 1160F RVW MEDS BY RX/DR IN RCRD: CPT | Mod: CPTII,,, | Performed by: PODIATRIST

## 2022-06-22 PROCEDURE — 3078F PR MOST RECENT DIASTOLIC BLOOD PRESSURE < 80 MM HG: ICD-10-PCS | Mod: CPTII,,, | Performed by: PODIATRIST

## 2022-06-22 PROCEDURE — 99204 OFFICE O/P NEW MOD 45 MIN: CPT | Mod: S$PBB,,, | Performed by: PODIATRIST

## 2022-06-22 PROCEDURE — 1160F PR REVIEW ALL MEDS BY PRESCRIBER/CLIN PHARMACIST DOCUMENTED: ICD-10-PCS | Mod: CPTII,,, | Performed by: PODIATRIST

## 2022-06-22 PROCEDURE — 3008F PR BODY MASS INDEX (BMI) DOCUMENTED: ICD-10-PCS | Mod: CPTII,,, | Performed by: PODIATRIST

## 2022-06-22 PROCEDURE — 3078F DIAST BP <80 MM HG: CPT | Mod: CPTII,,, | Performed by: PODIATRIST

## 2022-06-22 PROCEDURE — 3008F BODY MASS INDEX DOCD: CPT | Mod: CPTII,,, | Performed by: PODIATRIST

## 2022-06-22 PROCEDURE — 1159F MED LIST DOCD IN RCRD: CPT | Mod: CPTII,,, | Performed by: PODIATRIST

## 2022-06-22 PROCEDURE — 99214 OFFICE O/P EST MOD 30 MIN: CPT | Mod: PBBFAC,PN | Performed by: PODIATRIST

## 2022-06-22 PROCEDURE — 99999 PR PBB SHADOW E&M-EST. PATIENT-LVL IV: ICD-10-PCS | Mod: PBBFAC,,, | Performed by: PODIATRIST
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
## 2022-07-18 ENCOUNTER — TELEPHONE (OUTPATIENT)
Dept: PODIATRY | Facility: CLINIC | Age: 37
End: 2022-07-18
Payer: MEDICAID

## 2022-07-18 NOTE — TELEPHONE ENCOUNTER
----- Message from Davis Burt sent at 7/18/2022  1:36 PM CDT -----  Type:  Needs Medical Advice    Who Called: self  Reason:regarding scheduling surgery  Would the patient rather a call back or a response via [facility]? call  Best Call Back Number:  [phone]  Additional Information: none

## 2022-07-22 ENCOUNTER — OFFICE VISIT (OUTPATIENT)
Dept: PODIATRY | Facility: CLINIC | Age: 37
End: 2022-07-22
Payer: MEDICAID

## 2022-07-22 VITALS — WEIGHT: 149.31 LBS | HEIGHT: 70 IN | BODY MASS INDEX: 21.38 KG/M2

## 2022-07-22 DIAGNOSIS — S92.251A CLOSED DISPLACED FRACTURE OF NAVICULAR BONE OF RIGHT FOOT, INITIAL ENCOUNTER: ICD-10-CM

## 2022-07-22 DIAGNOSIS — M25.571 RIGHT ANKLE PAIN, UNSPECIFIED CHRONICITY: ICD-10-CM

## 2022-07-22 DIAGNOSIS — S93.301A: Primary | ICD-10-CM

## 2022-07-22 DIAGNOSIS — S92.001A CLOSED DISPLACED FRACTURE OF RIGHT CALCANEUS, UNSPECIFIED PORTION OF CALCANEUS, INITIAL ENCOUNTER: ICD-10-CM

## 2022-07-22 DIAGNOSIS — S92.211A CLOSED DISPLACED FRACTURE OF CUBOID OF RIGHT FOOT, INITIAL ENCOUNTER: ICD-10-CM

## 2022-07-22 PROCEDURE — 3008F BODY MASS INDEX DOCD: CPT | Mod: CPTII,,, | Performed by: PODIATRIST

## 2022-07-22 PROCEDURE — 99214 OFFICE O/P EST MOD 30 MIN: CPT | Mod: PBBFAC,PN | Performed by: PODIATRIST

## 2022-07-22 PROCEDURE — 99214 PR OFFICE/OUTPT VISIT, EST, LEVL IV, 30-39 MIN: ICD-10-PCS | Mod: 57,S$PBB,, | Performed by: PODIATRIST

## 2022-07-22 PROCEDURE — 1160F PR REVIEW ALL MEDS BY PRESCRIBER/CLIN PHARMACIST DOCUMENTED: ICD-10-PCS | Mod: CPTII,,, | Performed by: PODIATRIST

## 2022-07-22 PROCEDURE — 1159F PR MEDICATION LIST DOCUMENTED IN MEDICAL RECORD: ICD-10-PCS | Mod: CPTII,,, | Performed by: PODIATRIST

## 2022-07-22 PROCEDURE — 1160F RVW MEDS BY RX/DR IN RCRD: CPT | Mod: CPTII,,, | Performed by: PODIATRIST

## 2022-07-22 PROCEDURE — 3008F PR BODY MASS INDEX (BMI) DOCUMENTED: ICD-10-PCS | Mod: CPTII,,, | Performed by: PODIATRIST

## 2022-07-22 PROCEDURE — 1159F MED LIST DOCD IN RCRD: CPT | Mod: CPTII,,, | Performed by: PODIATRIST

## 2022-07-22 PROCEDURE — 99214 OFFICE O/P EST MOD 30 MIN: CPT | Mod: 57,S$PBB,, | Performed by: PODIATRIST

## 2022-07-22 PROCEDURE — 99999 PR PBB SHADOW E&M-EST. PATIENT-LVL IV: ICD-10-PCS | Mod: PBBFAC,,, | Performed by: PODIATRIST

## 2022-07-22 PROCEDURE — 99999 PR PBB SHADOW E&M-EST. PATIENT-LVL IV: CPT | Mod: PBBFAC,,, | Performed by: PODIATRIST

## 2022-07-22 RX ORDER — CEFAZOLIN SODIUM 2 G/50ML
2 SOLUTION INTRAVENOUS
Status: CANCELLED | OUTPATIENT
Start: 2022-07-22

## 2022-07-22 RX ORDER — TRAZODONE HYDROCHLORIDE 50 MG/1
100 TABLET ORAL NIGHTLY
COMMUNITY
Start: 2022-07-21 | End: 2023-05-10

## 2022-07-22 RX ORDER — BUPRENORPHINE HYDROCHLORIDE, NALOXONE HYDROCHLORIDE 8; 2 MG/1; MG/1
FILM, SOLUBLE BUCCAL; SUBLINGUAL DAILY
COMMUNITY
Start: 2022-06-22 | End: 2023-05-10

## 2022-07-22 RX ORDER — ALPRAZOLAM 1 MG/1
1 TABLET ORAL 2 TIMES DAILY PRN
COMMUNITY
Start: 2022-07-21 | End: 2023-05-10

## 2022-07-22 RX ORDER — SODIUM CHLORIDE 9 MG/ML
INJECTION, SOLUTION INTRAVENOUS CONTINUOUS
Status: CANCELLED | OUTPATIENT
Start: 2022-07-22

## 2022-07-22 RX ORDER — SODIUM CHLORIDE 0.9 % (FLUSH) 0.9 %
10 SYRINGE (ML) INJECTION
Status: CANCELLED | OUTPATIENT
Start: 2022-07-22
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
## 2022-08-03 ENCOUNTER — TELEPHONE (OUTPATIENT)
Dept: FAMILY MEDICINE | Facility: CLINIC | Age: 37
End: 2022-08-03

## 2022-08-03 NOTE — TELEPHONE ENCOUNTER
----- Message from Noreen Babcock MA sent at 8/3/2022 11:19 AM CDT -----  Pt calling for a pre-op appt. [phone]

## 2022-08-05 ENCOUNTER — TELEPHONE (OUTPATIENT)
Dept: FAMILY MEDICINE | Facility: CLINIC | Age: 37
End: 2022-08-05

## 2022-08-05 NOTE — TELEPHONE ENCOUNTER
----- Message from Elsa Zavaleat sent at 8/5/2022 11:30 AM CDT -----  Pt is having foot surgery on 8/16 and needs a surgery clearance   [phone]

## 2022-08-09 ENCOUNTER — OFFICE VISIT (OUTPATIENT)
Dept: FAMILY MEDICINE | Facility: CLINIC | Age: 37
End: 2022-08-09
Payer: MEDICAID

## 2022-08-09 VITALS
BODY MASS INDEX: 21.19 KG/M2 | HEIGHT: 70 IN | HEART RATE: 78 BPM | SYSTOLIC BLOOD PRESSURE: 98 MMHG | DIASTOLIC BLOOD PRESSURE: 68 MMHG | OXYGEN SATURATION: 98 % | WEIGHT: 148 LBS

## 2022-08-09 DIAGNOSIS — Z79.899 HIGH RISK MEDICATION USE: ICD-10-CM

## 2022-08-09 DIAGNOSIS — Z86.73 HISTORY OF CVA (CEREBROVASCULAR ACCIDENT): ICD-10-CM

## 2022-08-09 DIAGNOSIS — Z01.818 PRE-OPERATIVE CLEARANCE: Primary | ICD-10-CM

## 2022-08-09 DIAGNOSIS — Z72.0 TOBACCO USE: ICD-10-CM

## 2022-08-09 LAB
EKG 12-LEAD: NORMAL
PR INTERVAL: NORMAL
PRT AXES: NORMAL
QRS DURATION: NORMAL
QT/QTC: NORMAL
VENTRICULAR RATE: NORMAL

## 2022-08-09 PROCEDURE — 3074F PR MOST RECENT SYSTOLIC BLOOD PRESSURE < 130 MM HG: ICD-10-PCS | Mod: CPTII,S$GLB,, | Performed by: NURSE PRACTITIONER

## 2022-08-09 PROCEDURE — 99214 OFFICE O/P EST MOD 30 MIN: CPT | Mod: 25,S$GLB,, | Performed by: NURSE PRACTITIONER

## 2022-08-09 PROCEDURE — 3078F PR MOST RECENT DIASTOLIC BLOOD PRESSURE < 80 MM HG: ICD-10-PCS | Mod: CPTII,S$GLB,, | Performed by: NURSE PRACTITIONER

## 2022-08-09 PROCEDURE — 93000 ELECTROCARDIOGRAM COMPLETE: CPT | Mod: S$GLB,,, | Performed by: NURSE PRACTITIONER

## 2022-08-09 PROCEDURE — 99214 PR OFFICE/OUTPT VISIT, EST, LEVL IV, 30-39 MIN: ICD-10-PCS | Mod: 25,S$GLB,, | Performed by: NURSE PRACTITIONER

## 2022-08-09 PROCEDURE — 3008F BODY MASS INDEX DOCD: CPT | Mod: CPTII,S$GLB,, | Performed by: NURSE PRACTITIONER

## 2022-08-09 PROCEDURE — 3074F SYST BP LT 130 MM HG: CPT | Mod: CPTII,S$GLB,, | Performed by: NURSE PRACTITIONER

## 2022-08-09 PROCEDURE — 3078F DIAST BP <80 MM HG: CPT | Mod: CPTII,S$GLB,, | Performed by: NURSE PRACTITIONER

## 2022-08-09 PROCEDURE — 93000 POCT EKG 12-LEAD: ICD-10-PCS | Mod: S$GLB,,, | Performed by: NURSE PRACTITIONER

## 2022-08-09 PROCEDURE — 3008F PR BODY MASS INDEX (BMI) DOCUMENTED: ICD-10-PCS | Mod: CPTII,S$GLB,, | Performed by: NURSE PRACTITIONER

## 2022-08-09 RX ORDER — AMOXICILLIN AND CLAVULANATE POTASSIUM 875; 125 MG/1; MG/1
1 TABLET, FILM COATED ORAL 2 TIMES DAILY
COMMUNITY
Start: 2022-08-04 | End: 2022-08-30 | Stop reason: ALTCHOICE
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
## 2022-08-10 DIAGNOSIS — Z72.0 TOBACCO USE: Primary | ICD-10-CM

## 2022-08-10 LAB
ALBUMIN SERPL-MCNC: 4.3 G/DL (ref 3.6–5.1)
ALBUMIN/GLOB SERPL: 1.8 (CALC) (ref 1–2.5)
ALP SERPL-CCNC: 81 U/L (ref 36–130)
ALT SERPL-CCNC: 14 U/L (ref 9–46)
APPEARANCE UR: CLEAR
AST SERPL-CCNC: 19 U/L (ref 10–40)
BASOPHILS # BLD AUTO: 47 CELLS/UL (ref 0–200)
BASOPHILS NFR BLD AUTO: 0.4 %
BILIRUB SERPL-MCNC: 0.5 MG/DL (ref 0.2–1.2)
BILIRUB UR QL STRIP: NEGATIVE
BUN SERPL-MCNC: 16 MG/DL (ref 7–25)
BUN/CREAT SERPL: NORMAL (CALC) (ref 6–22)
CALCIUM SERPL-MCNC: 9.8 MG/DL (ref 8.6–10.3)
CHLORIDE SERPL-SCNC: 102 MMOL/L (ref 98–110)
CHOLEST SERPL-MCNC: 125 MG/DL
CHOLEST/HDLC SERPL: 3.9 (CALC)
CO2 SERPL-SCNC: 28 MMOL/L (ref 20–32)
COLOR UR: ABNORMAL
CREAT SERPL-MCNC: 0.94 MG/DL (ref 0.6–1.26)
EGFR: 108 ML/MIN/1.73M2
EOSINOPHIL # BLD AUTO: 189 CELLS/UL (ref 15–500)
EOSINOPHIL NFR BLD AUTO: 1.6 %
ERYTHROCYTE [DISTWIDTH] IN BLOOD BY AUTOMATED COUNT: 12.5 % (ref 11–15)
GLOBULIN SER CALC-MCNC: 2.4 G/DL (CALC) (ref 1.9–3.7)
GLUCOSE SERPL-MCNC: 75 MG/DL (ref 65–139)
GLUCOSE UR QL STRIP: NEGATIVE
HCT VFR BLD AUTO: 46.3 % (ref 38.5–50)
HDLC SERPL-MCNC: 32 MG/DL
HGB BLD-MCNC: 15.8 G/DL (ref 13.2–17.1)
HGB UR QL STRIP: NEGATIVE
KETONES UR QL STRIP: ABNORMAL
LDLC SERPL CALC-MCNC: 71 MG/DL (CALC)
LEUKOCYTE ESTERASE UR QL STRIP: NEGATIVE
LYMPHOCYTES # BLD AUTO: 2065 CELLS/UL (ref 850–3900)
LYMPHOCYTES NFR BLD AUTO: 17.5 %
MCH RBC QN AUTO: 30 PG (ref 27–33)
MCHC RBC AUTO-ENTMCNC: 34.1 G/DL (ref 32–36)
MCV RBC AUTO: 88 FL (ref 80–100)
MONOCYTES # BLD AUTO: 625 CELLS/UL (ref 200–950)
MONOCYTES NFR BLD AUTO: 5.3 %
NEUTROPHILS # BLD AUTO: 8874 CELLS/UL (ref 1500–7800)
NEUTROPHILS NFR BLD AUTO: 75.2 %
NITRITE UR QL STRIP: NEGATIVE
NONHDLC SERPL-MCNC: 93 MG/DL (CALC)
PH UR STRIP: 6 [PH] (ref 5–8)
PLATELET # BLD AUTO: 269 THOUSAND/UL (ref 140–400)
PMV BLD REES-ECKER: 10.7 FL (ref 7.5–12.5)
POTASSIUM SERPL-SCNC: 4.7 MMOL/L (ref 3.5–5.3)
PROT SERPL-MCNC: 6.7 G/DL (ref 6.1–8.1)
PROT UR QL STRIP: NEGATIVE
RBC # BLD AUTO: 5.26 MILLION/UL (ref 4.2–5.8)
SODIUM SERPL-SCNC: 139 MMOL/L (ref 135–146)
SP GR UR STRIP: 1.03 (ref 1–1.03)
TRIGL SERPL-MCNC: 141 MG/DL
TSH SERPL-ACNC: 2.03 MIU/L (ref 0.4–4.5)
WBC # BLD AUTO: 11.8 THOUSAND/UL (ref 3.8–10.8)

## 2022-08-11 ENCOUNTER — TELEPHONE (OUTPATIENT)
Dept: PODIATRY | Facility: CLINIC | Age: 37
End: 2022-08-11
Payer: MEDICAID

## 2022-08-11 ENCOUNTER — TELEPHONE (OUTPATIENT)
Dept: FAMILY MEDICINE | Facility: CLINIC | Age: 37
End: 2022-08-11

## 2022-08-11 NOTE — TELEPHONE ENCOUNTER
Patient informed that he needs to have nicotine lab done today. Patient gave verbal understanding

## 2022-08-11 NOTE — TELEPHONE ENCOUNTER
Patient voiced understanding. He did state that he was recently taking an antibiotic because he was sick but he is doing better.

## 2022-08-11 NOTE — TELEPHONE ENCOUNTER
----- Message from Ilana Nichole NP sent at 8/11/2022  1:55 PM CDT -----  Labs ok. Wbc is slightly elevated. Usually from recent infection. Have you been sick?

## 2022-08-12 ENCOUNTER — TELEPHONE (OUTPATIENT)
Dept: PODIATRY | Facility: CLINIC | Age: 37
End: 2022-08-12
Payer: MEDICAID

## 2022-08-12 NOTE — TELEPHONE ENCOUNTER
----- Message from Racheal Amin sent at 8/12/2022 12:54 PM CDT -----  Contact: [phone]  Type:  Needs Medical Advice    Who Called: Pt   Reason for call: regarding  speaking with the nurse regarding questions regarding his procedure on 8/16 and if the procedure is still a go  Would the patient rather a call back or a response via [facility]?  Call back  Best Call Back Number: [phone]

## 2022-08-21 ENCOUNTER — PATIENT MESSAGE (OUTPATIENT)
Dept: PODIATRY | Facility: CLINIC | Age: 37
End: 2022-08-21
Payer: MEDICAID

## 2022-08-22 ENCOUNTER — TELEPHONE (OUTPATIENT)
Dept: PODIATRY | Facility: CLINIC | Age: 37
End: 2022-08-22
Payer: MEDICAID

## 2022-08-22 DIAGNOSIS — Z72.0 TOBACCO USE: Primary | ICD-10-CM

## 2022-08-22 NOTE — TELEPHONE ENCOUNTER
----- Message from Jet Goodman sent at 8/22/2022 12:08 PM CDT -----  Contact: pt  Type: Requesting to speak with nurse        Who Called: PT  Regarding: questions about his post op appointment..  Would the patient rather a call back or a response via [facility]? Call back  Best Call Back Number: [phone]  Additional Information:

## 2022-08-26 ENCOUNTER — TELEPHONE (OUTPATIENT)
Dept: FAMILY MEDICINE | Facility: CLINIC | Age: 37
End: 2022-08-26

## 2022-08-26 NOTE — TELEPHONE ENCOUNTER
----- Message from Sharlene Puentes MA sent at 8/26/2022 11:24 AM CDT -----  Regarding: clearance  Good morning, the above patient was seen for surgical clearance on 08/09/2022. I do not see where it says he's cleared for surgery. Would it be possible to place a letter in his chart stating he's cleared? Same day surgery is asking for it.     Thank you,   Sharlene

## 2022-08-26 NOTE — LETTER
[address] [facility] [address]  [place] [address]  Phone: [phone]   [phone]                        MD José Luis Pereira MD Chequita Williams, MD Matthew Bassett, PA-C Linda Melerine, NP Jodi Powell, NP Hunter Reed, PA-C      Date: 08/26/2022        Patient: Garrick White  YOB: 1985      Patient is medically cleared for surgery.        Sincerely,     Electronically Signed By: Ilana Nichole NP

## 2022-08-31 ENCOUNTER — TELEPHONE (OUTPATIENT)
Dept: PODIATRY | Facility: CLINIC | Age: 37
End: 2022-08-31
Payer: MEDICAID

## 2022-08-31 NOTE — TELEPHONE ENCOUNTER
I called pt and had a long conversation about his smoking and surgery. His nicotine level is down but still elevated. Cotinine level is still high. He tells me he has been really trying hard to quit smoking (cigarettes) and now he has been vaping but very minimal. He tells me he will stop vaping starting today and will only use patch from now.     We discussed possible non delayed union of fusion as complications. Pt wishes to proceed with surgery on this Friday.

## 2022-09-06 ENCOUNTER — PATIENT MESSAGE (OUTPATIENT)
Dept: PODIATRY | Facility: CLINIC | Age: 37
End: 2022-09-06
Payer: MEDICAID

## 2022-09-06 RX ORDER — OXYCODONE AND ACETAMINOPHEN 10; 325 MG/1; MG/1
1 TABLET ORAL EVERY 6 HOURS PRN
Qty: 20 TABLET | Refills: 0 | Status: SHIPPED | OUTPATIENT
Start: 2022-09-06 | End: 2022-09-08 | Stop reason: SDUPTHER

## 2022-09-07 NOTE — TELEPHONE ENCOUNTER
Informed patient that the authorization would be done as soon as we receive it from his pharmacy. He gave verbal understanding.

## 2022-09-08 RX ORDER — OXYCODONE AND ACETAMINOPHEN 10; 325 MG/1; MG/1
1 TABLET ORAL EVERY 6 HOURS PRN
Qty: 20 TABLET | Refills: 0 | Status: SHIPPED | OUTPATIENT
Start: 2022-09-08 | End: 2023-05-10

## 2022-09-08 NOTE — TELEPHONE ENCOUNTER
Informed patient of the process for prior authorizations & that the pharmacy would contact us with the authorization information. He gave verbal understanding.

## 2022-09-19 ENCOUNTER — OFFICE VISIT (OUTPATIENT)
Dept: PODIATRY | Facility: CLINIC | Age: 37
End: 2022-09-19
Payer: MEDICAID

## 2022-09-19 VITALS — WEIGHT: 153.69 LBS | BODY MASS INDEX: 22 KG/M2 | HEIGHT: 70 IN

## 2022-09-19 DIAGNOSIS — Z09 FOLLOW-UP EXAMINATION FOLLOWING SURGERY: Primary | ICD-10-CM

## 2022-09-19 DIAGNOSIS — Z72.0 TOBACCO USE: ICD-10-CM

## 2022-09-19 PROCEDURE — 99024 POSTOP FOLLOW-UP VISIT: CPT | Mod: ,,, | Performed by: PODIATRIST

## 2022-09-19 PROCEDURE — 99999 PR PBB SHADOW E&M-EST. PATIENT-LVL III: CPT | Mod: PBBFAC,,, | Performed by: PODIATRIST

## 2022-09-19 PROCEDURE — 3008F BODY MASS INDEX DOCD: CPT | Mod: CPTII,,, | Performed by: PODIATRIST

## 2022-09-19 PROCEDURE — 3008F PR BODY MASS INDEX (BMI) DOCUMENTED: ICD-10-PCS | Mod: CPTII,,, | Performed by: PODIATRIST

## 2022-09-19 PROCEDURE — 99999 PR PBB SHADOW E&M-EST. PATIENT-LVL III: ICD-10-PCS | Mod: PBBFAC,,, | Performed by: PODIATRIST

## 2022-09-19 PROCEDURE — 99213 OFFICE O/P EST LOW 20 MIN: CPT | Mod: PBBFAC,PN | Performed by: PODIATRIST

## 2022-09-19 PROCEDURE — 99024 PR POST-OP FOLLOW-UP VISIT: ICD-10-PCS | Mod: ,,, | Performed by: PODIATRIST

## 2022-09-19 PROCEDURE — 1160F RVW MEDS BY RX/DR IN RCRD: CPT | Mod: CPTII,,, | Performed by: PODIATRIST

## 2022-09-19 PROCEDURE — 1160F PR REVIEW ALL MEDS BY PRESCRIBER/CLIN PHARMACIST DOCUMENTED: ICD-10-PCS | Mod: CPTII,,, | Performed by: PODIATRIST

## 2022-09-19 PROCEDURE — 1159F PR MEDICATION LIST DOCUMENTED IN MEDICAL RECORD: ICD-10-PCS | Mod: CPTII,,, | Performed by: PODIATRIST

## 2022-09-19 PROCEDURE — 1159F MED LIST DOCD IN RCRD: CPT | Mod: CPTII,,, | Performed by: PODIATRIST
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
## 2022-10-17 ENCOUNTER — OFFICE VISIT (OUTPATIENT)
Dept: PODIATRY | Facility: CLINIC | Age: 37
End: 2022-10-17
Payer: MEDICAID

## 2022-10-17 VITALS — BODY MASS INDEX: 22.05 KG/M2 | WEIGHT: 154 LBS | HEIGHT: 70 IN

## 2022-10-17 DIAGNOSIS — R29.898 DECREASED STRENGTH OF LOWER EXTREMITY: ICD-10-CM

## 2022-10-17 DIAGNOSIS — Z72.0 TOBACCO USE: ICD-10-CM

## 2022-10-17 DIAGNOSIS — Z09 FOLLOW-UP EXAMINATION FOLLOWING SURGERY: Primary | ICD-10-CM

## 2022-10-17 PROCEDURE — 99213 OFFICE O/P EST LOW 20 MIN: CPT | Mod: PBBFAC,PN | Performed by: PODIATRIST

## 2022-10-17 PROCEDURE — 1160F PR REVIEW ALL MEDS BY PRESCRIBER/CLIN PHARMACIST DOCUMENTED: ICD-10-PCS | Mod: CPTII,,, | Performed by: PODIATRIST

## 2022-10-17 PROCEDURE — 99999 PR PBB SHADOW E&M-EST. PATIENT-LVL III: ICD-10-PCS | Mod: PBBFAC,,, | Performed by: PODIATRIST

## 2022-10-17 PROCEDURE — 99024 PR POST-OP FOLLOW-UP VISIT: ICD-10-PCS | Mod: ,,, | Performed by: PODIATRIST

## 2022-10-17 PROCEDURE — 1160F RVW MEDS BY RX/DR IN RCRD: CPT | Mod: CPTII,,, | Performed by: PODIATRIST

## 2022-10-17 PROCEDURE — 1159F PR MEDICATION LIST DOCUMENTED IN MEDICAL RECORD: ICD-10-PCS | Mod: CPTII,,, | Performed by: PODIATRIST

## 2022-10-17 PROCEDURE — 1159F MED LIST DOCD IN RCRD: CPT | Mod: CPTII,,, | Performed by: PODIATRIST

## 2022-10-17 PROCEDURE — 99999 PR PBB SHADOW E&M-EST. PATIENT-LVL III: CPT | Mod: PBBFAC,,, | Performed by: PODIATRIST

## 2022-10-17 PROCEDURE — 99024 POSTOP FOLLOW-UP VISIT: CPT | Mod: ,,, | Performed by: PODIATRIST

## 2022-10-17 NOTE — LETTER
"  October 17, 2022      [facility] Podiat[facility]  [address], [address]  [place] [address]  Phone: [phone]  Fax: [phone]       Patient: Garrick White   YOB: 1985  Date of Visit: 10/17/2022    To Whom It May Concern:    Javi White  was at [facility] on 10/17/2022. The patient may return to work/school on 10/19/2022 with restrictions. Garrick is to wear an orthopedic boot while walking. He is to be allowed to work "light-duty". If you have any questions or concerns, or if I can be of further assistance, please do not hesitate to contact me.    Sincerely,      Sharlene Puentes MA     "
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
## 2023-05-10 ENCOUNTER — OFFICE VISIT (OUTPATIENT)
Dept: FAMILY MEDICINE | Facility: CLINIC | Age: 38
End: 2023-05-10
Payer: MEDICAID

## 2023-05-10 VITALS
WEIGHT: 172 LBS | SYSTOLIC BLOOD PRESSURE: 110 MMHG | HEART RATE: 86 BPM | OXYGEN SATURATION: 99 % | DIASTOLIC BLOOD PRESSURE: 70 MMHG | BODY MASS INDEX: 24.68 KG/M2

## 2023-05-10 DIAGNOSIS — F19.11 HISTORY OF SUBSTANCE ABUSE: ICD-10-CM

## 2023-05-10 DIAGNOSIS — Z72.0 TOBACCO USE: ICD-10-CM

## 2023-05-10 DIAGNOSIS — Z79.899 HIGH RISK MEDICATION USE: Primary | ICD-10-CM

## 2023-05-10 DIAGNOSIS — F41.9 ANXIETY: ICD-10-CM

## 2023-05-10 DIAGNOSIS — Z86.73 HISTORY OF CVA (CEREBROVASCULAR ACCIDENT): ICD-10-CM

## 2023-05-10 PROCEDURE — 3074F PR MOST RECENT SYSTOLIC BLOOD PRESSURE < 130 MM HG: ICD-10-PCS | Mod: CPTII,S$GLB,, | Performed by: NURSE PRACTITIONER

## 2023-05-10 PROCEDURE — 3074F SYST BP LT 130 MM HG: CPT | Mod: CPTII,S$GLB,, | Performed by: NURSE PRACTITIONER

## 2023-05-10 PROCEDURE — 99214 PR OFFICE/OUTPT VISIT, EST, LEVL IV, 30-39 MIN: ICD-10-PCS | Mod: S$GLB,,, | Performed by: NURSE PRACTITIONER

## 2023-05-10 PROCEDURE — 1159F PR MEDICATION LIST DOCUMENTED IN MEDICAL RECORD: ICD-10-PCS | Mod: CPTII,S$GLB,, | Performed by: NURSE PRACTITIONER

## 2023-05-10 PROCEDURE — 3078F DIAST BP <80 MM HG: CPT | Mod: CPTII,S$GLB,, | Performed by: NURSE PRACTITIONER

## 2023-05-10 PROCEDURE — 1160F PR REVIEW ALL MEDS BY PRESCRIBER/CLIN PHARMACIST DOCUMENTED: ICD-10-PCS | Mod: CPTII,S$GLB,, | Performed by: NURSE PRACTITIONER

## 2023-05-10 PROCEDURE — 1159F MED LIST DOCD IN RCRD: CPT | Mod: CPTII,S$GLB,, | Performed by: NURSE PRACTITIONER

## 2023-05-10 PROCEDURE — 1160F RVW MEDS BY RX/DR IN RCRD: CPT | Mod: CPTII,S$GLB,, | Performed by: NURSE PRACTITIONER

## 2023-05-10 PROCEDURE — 3078F PR MOST RECENT DIASTOLIC BLOOD PRESSURE < 80 MM HG: ICD-10-PCS | Mod: CPTII,S$GLB,, | Performed by: NURSE PRACTITIONER

## 2023-05-10 PROCEDURE — 3008F BODY MASS INDEX DOCD: CPT | Mod: CPTII,S$GLB,, | Performed by: NURSE PRACTITIONER

## 2023-05-10 PROCEDURE — 99214 OFFICE O/P EST MOD 30 MIN: CPT | Mod: S$GLB,,, | Performed by: NURSE PRACTITIONER

## 2023-05-10 PROCEDURE — 3008F PR BODY MASS INDEX (BMI) DOCUMENTED: ICD-10-PCS | Mod: CPTII,S$GLB,, | Performed by: NURSE PRACTITIONER

## 2023-05-10 RX ORDER — CITALOPRAM 10 MG/1
10 TABLET ORAL DAILY
Qty: 30 TABLET | Refills: 11 | Status: SHIPPED | OUTPATIENT
Start: 2023-05-10 | End: 2024-05-09

## 2023-05-10 RX ORDER — BUSPIRONE HYDROCHLORIDE 10 MG/1
10 TABLET ORAL 3 TIMES DAILY
Qty: 90 TABLET | Refills: 0 | Status: SHIPPED | OUTPATIENT
Start: 2023-05-10 | End: 2024-05-09

## 2023-05-16 ENCOUNTER — PATIENT OUTREACH (OUTPATIENT)
Dept: ADMINISTRATIVE | Facility: HOSPITAL | Age: 38
End: 2023-05-16
Payer: MEDICAID

## 2023-05-16 ENCOUNTER — PATIENT MESSAGE (OUTPATIENT)
Dept: ADMINISTRATIVE | Facility: HOSPITAL | Age: 38
End: 2023-05-16
Payer: MEDICAID

## 2023-05-17 NOTE — PROGRESS NOTES
SUBJECTIVE:    Patient ID: Garrick White is a 38 y.o. male.    Chief Complaint: Depression (No bottles/ pt is here to discussion his depression not taking any anxiety med or anything for depression/bg)    38 year old male presents for check up. Mother is present during the exam. Patient has a history of drug addiction. He recently quit taking suboxone and xanax. Feels on edge. Feel like mind is racing. Worries a lot. Not in therapy. No homicidal or suicidal ideations. Has been to in patient rehab on several occasions. Lives alone. H      No visits with results within 6 Month(s) from this visit.   Latest known visit with results is:   Admission on 09/02/2022, Discharged on 09/02/2022   Component Date Value Ref Range Status    SARS Coronavirus 2 Antigen 09/02/2022 Negative  Negative Final     Acceptable 09/02/2022 Yes   Final       Past Medical History:   Diagnosis Date    Anxiety     CVA (cerebral vascular accident)     Depression     Melanoma      Social History     Socioeconomic History    Marital status:    Tobacco Use    Smoking status: Every Day     Types: Vaping with nicotine    Smokeless tobacco: Never   Substance and Sexual Activity    Alcohol use: Yes     Alcohol/week: 3.0 standard drinks     Types: 3 Cans of beer per week    Drug use: Not Currently     Comment: heroin     Past Surgical History:   Procedure Laterality Date    APPLICATION OF SPLINT Right 9/2/2022    Procedure: APPLICATION, SPLINT;  Surgeon: Iva Garner DPM;  Location: [facility] OR;  Service: Podiatry;  Laterality: Right;    EXCISION OF MELANOMA      back of neck    HARVESTING OF BONE GRAFT Right 9/2/2022    Procedure: SURGICAL PROCUREMENT, BONE GRAFT;  Surgeon: Iva Garner DPM;  Location: [facility] OR;  Service: Podiatry;  Laterality: Right;  tibia  AUTOGRAFT HARVEST     MIDFOOT ARTHRODESIS Right 9/2/2022    Procedure: FUSION, JOINT, MIDFOOT;  Surgeon: Iva Garner DPM;  Location: [facility] OR;  Service: Podiatry;   Laterality: Right;  FUSION, JOINT, MIDFOOT (MULTIPLE)  POSTERIOR TIBIAL TENDON RESECTION  BONE EXCISION       Family History   Problem Relation Age of Onset    Hyperlipidemia Mother     ADD / ADHD Brother        Review of patient's allergies indicates:  No Known Allergies    Current Outpatient Medications:     busPIRone (BUSPAR) 10 MG tablet, Take 1 tablet (10 mg total) by mouth 3 (three) times daily., Disp: 90 tablet, Rfl: 0    citalopram (CELEXA) 10 MG tablet, Take 1 tablet (10 mg total) by mouth once daily., Disp: 30 tablet, Rfl: 11    Review of Systems   Constitutional:  Negative for fatigue, fever and unexpected weight change.   HENT:  Negative for ear pain, sinus pressure and sore throat.    Eyes:  Negative for pain.   Respiratory:  Negative for cough and shortness of breath.    Cardiovascular:  Negative for chest pain and leg swelling.   Gastrointestinal:  Negative for abdominal pain, constipation, nausea and vomiting.   Genitourinary:  Negative for dysuria, frequency and urgency.   Musculoskeletal:  Negative for arthralgias.   Skin:  Negative for rash.   Neurological:  Negative for dizziness, weakness and headaches.   Psychiatric/Behavioral:  Negative for sleep disturbance.         Objective:      Vitals:    05/10/23 0856   BP: 110/70   Pulse: 86   SpO2: 99%   Weight: 78 kg (172 lb)     Physical Exam  Vitals and nursing note reviewed.   Constitutional:       General: He is not in acute distress.     Appearance: Normal appearance. He is well-developed.   Eyes:      Pupils: Pupils are equal, round, and reactive to light.   Neck:      Trachea: No tracheal deviation.   Cardiovascular:      Rate and Rhythm: Normal rate and regular rhythm.      Heart sounds: No murmur heard.    No friction rub. No gallop.   Pulmonary:      Breath sounds: Normal breath sounds. No stridor. No wheezing or rales.   Abdominal:      Palpations: Abdomen is soft. There is no mass.      Tenderness: There is no abdominal tenderness.    Musculoskeletal:         General: No tenderness or deformity.      Cervical back: Neck supple.   Lymphadenopathy:      Cervical: No cervical adenopathy.   Skin:     General: Skin is warm and dry.   Neurological:      Mental Status: He is alert and oriented to person, place, and time.      Coordination: Coordination normal.   Psychiatric:         Thought Content: Thought content normal.         Assessment:       1. High risk medication use    2. Anxiety    3. History of CVA (cerebrovascular accident)    4. History of substance abuse    5. Tobacco use         Plan:       High risk medication use  -     citalopram (CELEXA) 10 MG tablet; Take 1 tablet (10 mg total) by mouth once daily.  Dispense: 30 tablet; Refill: 11  -     busPIRone (BUSPAR) 10 MG tablet; Take 1 tablet (10 mg total) by mouth 3 (three) times daily.  Dispense: 90 tablet; Refill: 0    Anxiety  Comments:  start meds. contracts for safety.   Orders:  -     citalopram (CELEXA) 10 MG tablet; Take 1 tablet (10 mg total) by mouth once daily.  Dispense: 30 tablet; Refill: 11  -     busPIRone (BUSPAR) 10 MG tablet; Take 1 tablet (10 mg total) by mouth 3 (three) times daily.  Dispense: 90 tablet; Refill: 0    History of CVA (cerebrovascular accident)    History of substance abuse    Tobacco use      Follow up in about 4 weeks (around 6/7/2023).        5/17/2023 Ilana Nichole

## 2023-05-27 ENCOUNTER — HOSPITAL ENCOUNTER (EMERGENCY)
Facility: HOSPITAL | Age: 38
Discharge: HOME | End: 2023-05-27
Attending: EMERGENCY MEDICINE
Payer: MEDICAID

## 2023-05-27 VITALS
DIASTOLIC BLOOD PRESSURE: 85 MMHG | SYSTOLIC BLOOD PRESSURE: 126 MMHG | BODY MASS INDEX: 24.62 KG/M2 | RESPIRATION RATE: 10 BRPM | HEART RATE: 72 BPM | HEIGHT: 70 IN | TEMPERATURE: 98 F | WEIGHT: 172 LBS | OXYGEN SATURATION: 97 %

## 2023-05-27 DIAGNOSIS — R07.9 CHEST PAIN: Primary | ICD-10-CM

## 2023-05-27 DIAGNOSIS — F41.9 ANXIETY: ICD-10-CM

## 2023-05-27 LAB
ALBUMIN SERPL BCP-MCNC: 4 G/DL (ref 3.5–5.2)
ALP SERPL-CCNC: 71 U/L (ref 55–135)
ALT SERPL W/O P-5'-P-CCNC: 17 U/L (ref 10–44)
ANION GAP SERPL CALC-SCNC: 7 MMOL/L (ref 8–16)
AST SERPL-CCNC: 18 U/L (ref 10–40)
BASOPHILS # BLD AUTO: 0.04 K/UL (ref 0–0.2)
BASOPHILS NFR BLD: 0.5 % (ref 0–1.9)
BILIRUB SERPL-MCNC: 1 MG/DL (ref 0.1–1)
BNP SERPL-MCNC: 13 PG/ML (ref 0–99)
BUN SERPL-MCNC: 9 MG/DL (ref 6–20)
CALCIUM SERPL-MCNC: 9 MG/DL (ref 8.7–10.5)
CHLORIDE SERPL-SCNC: 106 MMOL/L (ref 95–110)
CO2 SERPL-SCNC: 27 MMOL/L (ref 23–29)
CREAT SERPL-MCNC: 0.9 MG/DL (ref 0.5–1.4)
DIFFERENTIAL METHOD: NORMAL
EOSINOPHIL # BLD AUTO: 0 K/UL (ref 0–0.5)
EOSINOPHIL NFR BLD: 0.4 % (ref 0–8)
ERYTHROCYTE [DISTWIDTH] IN BLOOD BY AUTOMATED COUNT: 12 % (ref 11.5–14.5)
EST. GFR  (NO RACE VARIABLE): >60 ML/MIN/1.73 M^2
GLUCOSE SERPL-MCNC: 105 MG/DL (ref 70–110)
HCT VFR BLD AUTO: 47.6 % (ref 40–54)
HGB BLD-MCNC: 16.4 G/DL (ref 14–18)
IMM GRANULOCYTES # BLD AUTO: 0.02 K/UL (ref 0–0.04)
IMM GRANULOCYTES NFR BLD AUTO: 0.3 % (ref 0–0.5)
INR PPP: 1 (ref 0.8–1.2)
LYMPHOCYTES # BLD AUTO: 1.7 K/UL (ref 1–4.8)
LYMPHOCYTES NFR BLD: 22.1 % (ref 18–48)
MAGNESIUM SERPL-MCNC: 1.9 MG/DL (ref 1.6–2.6)
MCH RBC QN AUTO: 30.8 PG (ref 27–31)
MCHC RBC AUTO-ENTMCNC: 34.5 G/DL (ref 32–36)
MCV RBC AUTO: 89 FL (ref 82–98)
MONOCYTES # BLD AUTO: 0.4 K/UL (ref 0.3–1)
MONOCYTES NFR BLD: 5.6 % (ref 4–15)
NEUTROPHILS # BLD AUTO: 5.3 K/UL (ref 1.8–7.7)
NEUTROPHILS NFR BLD: 71.1 % (ref 38–73)
NRBC BLD-RTO: 0 /100 WBC
PLATELET # BLD AUTO: 222 K/UL (ref 150–450)
PMV BLD AUTO: 10.1 FL (ref 9.2–12.9)
POTASSIUM SERPL-SCNC: 3.6 MMOL/L (ref 3.5–5.1)
PROT SERPL-MCNC: 6.7 G/DL (ref 6–8.4)
PROTHROMBIN TIME: 11.2 SEC (ref 9–12.5)
RBC # BLD AUTO: 5.33 M/UL (ref 4.6–6.2)
SODIUM SERPL-SCNC: 140 MMOL/L (ref 136–145)
TROPONIN I SERPL HS-MCNC: <2.3 PG/ML (ref 0–14.9)
TROPONIN I SERPL HS-MCNC: <2.3 PG/ML (ref 0–14.9)
WBC # BLD AUTO: 7.45 K/UL (ref 3.9–12.7)

## 2023-05-27 PROCEDURE — 83735 ASSAY OF MAGNESIUM: CPT | Performed by: EMERGENCY MEDICINE

## 2023-05-27 PROCEDURE — 93010 ELECTROCARDIOGRAM REPORT: CPT | Mod: ,,, | Performed by: GENERAL PRACTICE

## 2023-05-27 PROCEDURE — 99284 EMERGENCY DEPT VISIT MOD MDM: CPT | Mod: 25

## 2023-05-27 PROCEDURE — 85025 COMPLETE CBC W/AUTO DIFF WBC: CPT | Performed by: EMERGENCY MEDICINE

## 2023-05-27 PROCEDURE — 25000003 PHARM REV CODE 250: Performed by: EMERGENCY MEDICINE

## 2023-05-27 PROCEDURE — 83880 ASSAY OF NATRIURETIC PEPTIDE: CPT | Performed by: EMERGENCY MEDICINE

## 2023-05-27 PROCEDURE — 93010 EKG 12-LEAD: ICD-10-PCS | Mod: ,,, | Performed by: GENERAL PRACTICE

## 2023-05-27 PROCEDURE — 85610 PROTHROMBIN TIME: CPT | Performed by: EMERGENCY MEDICINE

## 2023-05-27 PROCEDURE — 93005 ELECTROCARDIOGRAM TRACING: CPT | Performed by: GENERAL PRACTICE

## 2023-05-27 PROCEDURE — 84484 ASSAY OF TROPONIN QUANT: CPT | Mod: 91 | Performed by: EMERGENCY MEDICINE

## 2023-05-27 PROCEDURE — 80053 COMPREHEN METABOLIC PANEL: CPT | Performed by: EMERGENCY MEDICINE

## 2023-05-27 RX ORDER — NAPROXEN SODIUM 220 MG/1
243 TABLET, FILM COATED ORAL
Status: COMPLETED | OUTPATIENT
Start: 2023-05-27 | End: 2023-05-27

## 2023-05-27 RX ADMIN — ASPIRIN 81 MG 243 MG: 81 TABLET ORAL at 07:05

## 2023-05-27 NOTE — DISCHARGE INSTRUCTIONS
RETURN TO EMERGENCY DEPARTMENT WITHOUT FAIL, IF YOUR SYMPTOMS WORSEN, IF YOU GET NEW OR DIFFERENT SYMPTOMS, IF YOU ARE UNABLE TO FOLLOW UP AS DIRECTED, OR IF YOU HAVE ANY CONCERNS OR WORRIES.    Follow-up with your primary care provider for further evaluation of your symptoms.  Return if symptoms change or worsen.

## 2023-05-27 NOTE — ED PROVIDER NOTES
"Encounter Date: 5/27/2023       History     Chief Complaint   Patient presents with    Chest Pain     X week "off and on" couldn't take it anymore so came in today.      38-year-old male with past medical history of anxiety, depression, history of CVA, presents emergency department with chest pain.  He says that he is had it off and on for about a week maybe longer.  Describes it as aching in his center of his chest sometimes he can feel it into his neck.  He says that nothing seems to bring it on or make it better or worse.  He denies any associated nausea or vomiting he denies any diaphoresis or any shortness of breath.  He says that he has been under significant amount of stress recently.  He denies that he suicidal or homicidal ideation.  Does admit to being depressed.  He says that he has a history of drug use in the past but has not done any in years.  No alcohol abuse either.  He says that this is keeping him up at night any can not sleep and this is his main concern.    Review of patient's allergies indicates:  No Known Allergies  Past Medical History:   Diagnosis Date    Anxiety     CVA (cerebral vascular accident)     Depression     Melanoma      Past Surgical History:   Procedure Laterality Date    APPLICATION OF SPLINT Right 9/2/2022    Procedure: APPLICATION, SPLINT;  Surgeon: Iva Garner DPM;  Location: [facility] OR;  Service: Podiatry;  Laterality: Right;    EXCISION OF MELANOMA      back of neck    HARVESTING OF BONE GRAFT Right 9/2/2022    Procedure: SURGICAL PROCUREMENT, BONE GRAFT;  Surgeon: Iva Garner DPM;  Location: [facility] OR;  Service: Podiatry;  Laterality: Right;  tibia  AUTOGRAFT HARVEST     MIDFOOT ARTHRODESIS Right 9/2/2022    Procedure: FUSION, JOINT, MIDFOOT;  Surgeon: Iva Garner DPM;  Location: [facility] OR;  Service: Podiatry;  Laterality: Right;  FUSION, JOINT, MIDFOOT (MULTIPLE)  POSTERIOR TIBIAL TENDON RESECTION  BONE EXCISION       Family History   Problem Relation Age of Onset    " Hyperlipidemia Mother     ADD / ADHD Brother      Social History     Tobacco Use    Smoking status: Every Day     Types: Vaping with nicotine    Smokeless tobacco: Never   Substance Use Topics    Alcohol use: Yes     Alcohol/week: 3.0 standard drinks     Types: 3 Cans of beer per week    Drug use: Not Currently     Comment: heroin     Review of Systems   Constitutional:  Negative for chills and fever.   HENT:  Negative for sore throat.    Respiratory:  Negative for shortness of breath.    Cardiovascular:  Positive for chest pain. Negative for palpitations.   Gastrointestinal:  Negative for abdominal pain, nausea and vomiting.   Genitourinary:  Negative for dysuria.   Musculoskeletal:  Negative for back pain.   Skin:  Negative for rash.   Neurological:  Negative for seizures, weakness and headaches.   Hematological:  Does not bruise/bleed easily.   Psychiatric/Behavioral:  The patient is nervous/anxious.    All other systems reviewed and are negative.    Physical Exam     Initial Vitals [05/27/23 0653]   BP Pulse Resp Temp SpO2   (!) 136/90 78 18 97.7 °F (36.5 °C) 98 %      MAP       --         Physical Exam    Nursing note and vitals reviewed.  Constitutional: He appears well-developed and well-nourished. He is not diaphoretic. No distress.   HENT:   Head: Normocephalic and atraumatic.   Mouth/Throat: Oropharynx is clear and moist. No oropharyngeal exudate.   Eyes: Conjunctivae and EOM are normal. Pupils are equal, round, and reactive to light.   Neck: Neck supple. No tracheal deviation present.   Cardiovascular:  Normal rate, regular rhythm, normal heart sounds and intact distal pulses.           No murmur heard.  Pulmonary/Chest: Breath sounds normal. No stridor. No respiratory distress. He has no wheezes. He has no rhonchi. He has no rales.   Abdominal: Abdomen is soft. Bowel sounds are normal. He exhibits no distension. There is no abdominal tenderness. There is no rebound and no guarding.   Musculoskeletal:          General: No tenderness or edema. Normal range of motion.      Cervical back: Neck supple.     Neurological: He is alert and oriented to person, place, and time. He has normal strength. No cranial nerve deficit or sensory deficit. GCS score is 15. GCS eye subscore is 4. GCS verbal subscore is 5. GCS motor subscore is 6.   Skin: Skin is warm and dry. Capillary refill takes less than 2 seconds. No rash noted. No erythema. No pallor.   Psychiatric: Thought content normal. His affect is blunt. He is withdrawn. He exhibits a depressed mood. He expresses no homicidal and no suicidal ideation.       ED Course   Procedures  Labs Reviewed   COMPREHENSIVE METABOLIC PANEL - Abnormal; Notable for the following components:       Result Value    Anion Gap 7 (*)     All other components within normal limits   CBC W/ AUTO DIFFERENTIAL   B-TYPE NATRIURETIC PEPTIDE   TROPONIN I HIGH SENSITIVITY   PROTIME-INR   MAGNESIUM   TROPONIN I HIGH SENSITIVITY        ECG Results              EKG 12-lead (In process)  Result time 05/27/23 09:30:46      In process by Interface, Lab In [facility] (05/27/23 09:30:46)                   Narrative:    Test Reason : R07.9,    Vent. Rate : 069 BPM     Atrial Rate : 069 BPM     P-R Int : 134 ms          QRS Dur : 088 ms      QT Int : 382 ms       P-R-T Axes : 074 072 067 degrees     QTc Int : 409 ms    Normal sinus rhythm  Minimal voltage criteria for LVH, may be normal variant  Borderline Abnormal ECG  When compared with ECG of 14-DEC-2020 11:39,  No significant change was found    Referred By: [email or web address]   SELF           Confirmed By:                                   Results for orders placed or performed during the hospital encounter of 05/27/23   CBC auto differential   Result Value Ref Range    WBC 7.45 3.90 - 12.70 K/uL    RBC 5.33 4.60 - 6.20 M/uL    Hemoglobin 16.4 14.0 - 18.0 g/dL    Hematocrit 47.6 40.0 - 54.0 %    MCV 89 82 - 98 fL    MCH 30.8 27.0 - 31.0 pg    MCHC 34.5 32.0 - 36.0 g/dL     RDW 12.0 11.5 - 14.5 %    Platelets 222 150 - 450 K/uL    MPV 10.1 9.2 - 12.9 fL    Immature Granulocytes 0.3 0.0 - 0.5 %    Gran # (ANC) 5.3 1.8 - 7.7 K/uL    Immature Grans (Abs) 0.02 0.00 - 0.04 K/uL    Lymph # 1.7 1.0 - 4.8 K/uL    Mono # 0.4 0.3 - 1.0 K/uL    Eos # 0.0 0.0 - 0.5 K/uL    Baso # 0.04 0.00 - 0.20 K/uL    nRBC 0 0 /100 WBC    Gran % 71.1 38.0 - 73.0 %    Lymph % 22.1 18.0 - 48.0 %    Mono % 5.6 4.0 - 15.0 %    Eosinophil % 0.4 0.0 - 8.0 %    Basophil % 0.5 0.0 - 1.9 %    Differential Method Automated    Comprehensive metabolic panel   Result Value Ref Range    Sodium 140 136 - 145 mmol/L    Potassium 3.6 3.5 - 5.1 mmol/L    Chloride 106 95 - 110 mmol/L    CO2 27 23 - 29 mmol/L    Glucose 105 70 - 110 mg/dL    BUN 9 6 - 20 mg/dL    Creatinine 0.9 0.5 - 1.4 mg/dL    Calcium 9.0 8.7 - 10.5 mg/dL    Total Protein 6.7 6.0 - 8.4 g/dL    Albumin 4.0 3.5 - 5.2 g/dL    Total Bilirubin 1.0 0.1 - 1.0 mg/dL    Alkaline Phosphatase 71 55 - 135 U/L    AST 18 10 - 40 U/L    ALT 17 10 - 44 U/L    Anion Gap 7 (L) 8 - 16 mmol/L    eGFR >60.0 >60 mL/min/1.73 m^2   Brain natriuretic peptide   Result Value Ref Range    BNP 13 0 - 99 pg/mL   Troponin I High Sensitivity #1   Result Value Ref Range    Troponin I High Sensitivity <2.3 0.0 - 14.9 pg/mL   Protime-INR   Result Value Ref Range    Prothrombin Time 11.2 9.0 - 12.5 sec    INR 1.0 0.8 - 1.2   Magnesium   Result Value Ref Range    Magnesium 1.9 1.6 - 2.6 mg/dL   Troponin I High Sensitivity #2   Result Value Ref Range    Troponin I High Sensitivity <2.3 0.0 - 14.9 pg/mL     X-Ray Chest AP Portable   Final Result          Imaging Results              X-Ray Chest AP Portable (Final result)  Result time 05/27/23 07:51:09      Final result by Jama George MD (05/27/23 07:51:09)                   Narrative:    XR CHEST 1 VIEW    CLINICAL HISTORY:  38 years Male chest pain    COMPARISON: None    FINDINGS: Cardiac silhouette size is within normal limits. No confluent  airspace disease. No large pleural effusion or pneumothorax. No acute osseous abnormality.    IMPRESSION: No acute pulmonary process.    Electronically signed by:  Jama George MD  5/27/2023 7:51 AM CDT Workstation: [id number]                                     Medications   aspirin chewable tablet 243 mg (243 mg Oral Given 5/27/23 7104)     Medical Decision Making:   Clinical Tests:   Lab Tests: Ordered and Reviewed  Radiological Study: Ordered and Reviewed  Medical Tests: Ordered and Reviewed  ED Management:  38-year-old male presents emergency department with chest pain as described above.  He is well-appearing, nontoxic, no distress.  He has a flat and depressed affect.  Emergency department he is had 2- troponins.  He is a nonischemic EKG.  Heart score puts him at low risk.  Doubt ACS.  Doubt PE, perc score is 0.  Low suspicion for aortic dissection, no evidence of pneumonia, pneumothorax.  No evidence of pulmonary edema, there is no cardiomegaly, doubt pericardial effusion/pericardial tamponade.  Plan to discharge patient home with follow-up with primary care provider for further evaluation of symptoms.  Likely has anxiety/depression component.  I discussed this in detail with the patient he has been given a prescription to treat anxiety and depression but is not taking it.  I recommend he take this medication to see if it helps.  He will follow-up with PCP and return if symptoms change or worsen.    I had a detailed discussion with the patient and/or guardian regarding: The historical points, exam findings, and diagnostic results supporting the discharge diagnosis, lab results, pertinent radiology results, and the need for outpatient follow-up, for definitive care with a family practitioner and to return to the emergency department if symptoms worsen or persist or if there are any questions or concerns that arise at home. All questions have been answered in detail. Strict return to Emergency Department  precautions have been provided    A dictation software program was used for this note.  Please expect some simple typographical  errors in this note.   Additional MDM:   Heart Score:    History:          Slightly suspicious.  ECG:             Normal  Age:               Less than 45 years  Risk factors: 1-2 risk factors  Troponin:       Less than or equal to normal limit  Final Score: 1          ED Course as of 05/27/23 1040   Sat May 27, 2023   0708 EKG 6:59 a.m. normal sinus rhythm rate of 69.  Left ventricular hypertrophy.  No STEMI.  EKG interpreted independently by me.  EKG unchanged when compared to prior EKG done on December 14, 2020 [JR]      ED Course User Index  [JR] Jamal Flores DO                 Clinical Impression:   Final diagnoses:  [R07.9] Chest pain (Primary)  [F41.9] Anxiety        ED Disposition Condition    Discharge Stable          ED Prescriptions    None       Follow-up Information       Follow up With Specialties Details Why Contact Info Additional Information    Ilana Nichole NP Family Medicine In 3 days  [address]  [address]  [place] [address]  [phone]       [facility] - Emergency Dept Emergency Medicine  If symptoms worsen [address] [id number]  [phone] 1st floor             Jamal Flores DO  05/27/23 1040

## 2023-06-03 ENCOUNTER — PATIENT MESSAGE (OUTPATIENT)
Dept: FAMILY MEDICINE | Facility: CLINIC | Age: 38
End: 2023-06-03

## 2023-06-05 ENCOUNTER — PATIENT MESSAGE (OUTPATIENT)
Dept: FAMILY MEDICINE | Facility: CLINIC | Age: 38
End: 2023-06-05

## 2023-06-05 RX ORDER — TRAZODONE HYDROCHLORIDE 150 MG/1
150 TABLET ORAL NIGHTLY
COMMUNITY

## 2023-06-05 NOTE — TELEPHONE ENCOUNTER
We could increase to 150 mg.  If still no improvement can discuss further with Ilana and she may want to start something different

## 2023-06-19 ENCOUNTER — PATIENT MESSAGE (OUTPATIENT)
Dept: FAMILY MEDICINE | Facility: CLINIC | Age: 38
End: 2023-06-19

## 2023-06-26 ENCOUNTER — PATIENT MESSAGE (OUTPATIENT)
Dept: FAMILY MEDICINE | Facility: CLINIC | Age: 38
End: 2023-06-26

## 2023-06-26 DIAGNOSIS — Z85.828 HISTORY OF SKIN CANCER: Primary | ICD-10-CM

## 2023-06-26 RX ORDER — MIRTAZAPINE 30 MG/1
30 TABLET, FILM COATED ORAL NIGHTLY
Qty: 30 TABLET | Refills: 3 | Status: SHIPPED | OUTPATIENT
Start: 2023-06-26 | End: 2024-06-25

## 2023-07-05 ENCOUNTER — TELEPHONE (OUTPATIENT)
Dept: DERMATOLOGY | Facility: CLINIC | Age: 38
End: 2023-07-05
Payer: MEDICAID

## 2023-07-05 NOTE — TELEPHONE ENCOUNTER
At this time we don't have any available appointments to accommodate pt insurance.      Unfortunately at this time, our medicaid slots are full. Please use the below resources to clinics in the area with sooner medicaid availability.     MEDICAID RESOURCES     [facility]  3rd floor of ambulatory building (usually 3030)  [address] [place] [address]  appt tel: [phone]  fax #: [phone]  Staff: Dr. Nichol Brenner  Children 17 and under will be accepted-soonest available 2 months out     [facility] ([facility] or [facility])  Clinic 4B   [address] [place] [address]  [facility] scheduling tel: [phone]   Adults 18 and over-soonest available 1 month out  Dr. [facility]- Only children under 15  [phone] Or Book online at [facility]  [address]  [place] [address]  In the [facility] office building  Only sees children under 15  MARY Rivas  Soonest appointment in 4 weeks  M-F 8-4:30p and 8-5:30p on Thursday      [facility]- ALL AGES   [place] [phone]             [address]             [place] [address]             Soonest available 3 weeks  [place] [phone]             [address] [address]             Soonest available 4 weeks  [place] [phone]             [address] [place] [address]             Soonest available 2 weeks  ----- Message from Donna Lisa LPN sent at 6/30/2023  5:46 PM CDT -----  Contact: Jennie [phone]    ----- Message -----  From: Ct Tadeo  Sent: 6/30/2023   2:27 PM CDT  To: Marion Delacruz Staff    Type: Needs Medical Advice  Who Called:  Pts Mother Jennie Dhillon Call Back Number:[phone]    Additional Information: Jennie calling to schedule her son appt. Pt has medicaid and has a referral on file. Pls call back and advise

## 2024-07-08 ENCOUNTER — OCCUPATIONAL HEALTH (OUTPATIENT)
Dept: URGENT CARE | Facility: CLINIC | Age: 39
End: 2024-07-08

## 2024-07-08 DIAGNOSIS — Z13.9 ENCOUNTER FOR SCREENING: Primary | ICD-10-CM

## 2024-07-08 LAB
BREATH ALCOHOL: 0
CTP QC/QA: YES
POC 5 PANEL DRUG SCREEN: NEGATIVE

## 2024-07-09 ENCOUNTER — PATIENT MESSAGE (OUTPATIENT)
Dept: ADMINISTRATIVE | Facility: HOSPITAL | Age: 39
End: 2024-07-09
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
## 2024-11-26 ENCOUNTER — PATIENT MESSAGE (OUTPATIENT)
Dept: PODIATRY | Facility: CLINIC | Age: 39
End: 2024-11-26

## 2024-11-26 ENCOUNTER — OFFICE VISIT (OUTPATIENT)
Dept: PODIATRY | Facility: CLINIC | Age: 39
End: 2024-11-26
Payer: COMMERCIAL

## 2024-11-26 VITALS
SYSTOLIC BLOOD PRESSURE: 98 MMHG | WEIGHT: 142.88 LBS | BODY MASS INDEX: 20.46 KG/M2 | HEIGHT: 70 IN | HEART RATE: 59 BPM | DIASTOLIC BLOOD PRESSURE: 65 MMHG

## 2024-11-26 DIAGNOSIS — M79.671 PAIN IN RIGHT FOOT: ICD-10-CM

## 2024-11-26 DIAGNOSIS — T84.498A MECHANICAL COMPLICATION OF INTERNAL ORTHOPEDIC DEVICE, IMPLANT OR GRAFT, INITIAL ENCOUNTER: Primary | ICD-10-CM

## 2024-11-26 DIAGNOSIS — Z87.81 HISTORY OF FOOT FRACTURE: ICD-10-CM

## 2024-11-26 DIAGNOSIS — Z98.890 S/P ORIF (OPEN REDUCTION INTERNAL FIXATION) FRACTURE: ICD-10-CM

## 2024-11-26 DIAGNOSIS — Z87.81 S/P ORIF (OPEN REDUCTION INTERNAL FIXATION) FRACTURE: ICD-10-CM

## 2024-11-26 PROCEDURE — 99999 PR PBB SHADOW E&M-EST. PATIENT-LVL IV: CPT | Mod: PBBFAC,,, | Performed by: PODIATRIST

## 2024-11-26 RX ORDER — SODIUM CHLORIDE 0.9 % (FLUSH) 0.9 %
10 SYRINGE (ML) INJECTION
OUTPATIENT
Start: 2024-12-06

## 2024-11-26 RX ORDER — CLONAZEPAM 1 MG/1
1 TABLET ORAL 2 TIMES DAILY
COMMUNITY

## 2024-12-04 ENCOUNTER — TELEPHONE (OUTPATIENT)
Dept: PODIATRY | Facility: CLINIC | Age: 39
End: 2024-12-04
Payer: COMMERCIAL

## 2024-12-04 NOTE — TELEPHONE ENCOUNTER
Spoke with patient and he stated that his primary doctors office sent he a message to e-sign a for for surgery clearance. Advised patient to call his doctors office to get a clearance letter and fax to our office. Verbalized understanding and no further issues discussed.----- Message from VANESSA John sent at 12/4/2024 12:46 PM CST -----  Regarding: clearance  I am unable to reach patient for PAT, I do not see a clearance on patient.  Will he require one?

## 2024-12-05 ENCOUNTER — TELEPHONE (OUTPATIENT)
Dept: PODIATRY | Facility: CLINIC | Age: 39
End: 2024-12-05
Payer: COMMERCIAL

## 2024-12-05 NOTE — TELEPHONE ENCOUNTER
Spoke with patient and stated he has an appointment on 12/10/24 with his primary doctor. Informed patient that insurance has not approved surgery yet and will need to be rescheduled. Told patient once surgery is approved that I will let him know. Patient verbalized understanding and no further issues discussed.----- Message from Gi sent at 12/5/2024  8:23 AM CST -----  Contact: pt @ [phone]  ALOK LEOS calling regarding Appointment Access  (message) for #pt is calling to reschedule procedure 12/6, asking for call back

## 2024-12-05 NOTE — TELEPHONE ENCOUNTER
Left message that his surgery is canceled for tomorrow 12/6/24 due to insurance did not approve the surgery. Also he was told that he needed clearance from his PCP.

## 2024-12-05 NOTE — TELEPHONE ENCOUNTER
Patient called and just wanted to confirm that surgery is canceled tomorrow. Informed him that it is canceled due to his insurance did not approve his surgery. Informed him that hopefully that it will be approved and he can be added on 12/13/24 for surgery. Verbalized understanding and no further issues discussed.

## 2024-12-09 ENCOUNTER — TELEPHONE (OUTPATIENT)
Dept: PODIATRY | Facility: CLINIC | Age: 39
End: 2024-12-09
Payer: COMMERCIAL

## 2024-12-09 NOTE — TELEPHONE ENCOUNTER
Spoke with patient and per Dr. Rodriguez keep the open wound clean can use neosporin and a bandage on it. Patient verbalized understanding and no further issues discussed.

## 2024-12-09 NOTE — TELEPHONE ENCOUNTER
Spoke with patient and he stated the spot on his foot open up and can see the screw. Told patient I will call him back to see what Dr. Rodriguez would like to do. Verbalized understanding and no further issues discussed.----- Message from Liza sent at 12/9/2024 10:39 AM CST -----  Regarding: Surgery Inquiry  Type: Surgery Inquiry     Who Called:ALOK LEOS    Would the patient rather a call back or a response via [facility]? Call back    Best Call Back Number:  [phone]    Additional Information:Patient is asking to speak to office about surgery question he has.

## 2024-12-12 ENCOUNTER — TELEPHONE (OUTPATIENT)
Dept: PODIATRY | Facility: CLINIC | Age: 39
End: 2024-12-12
Payer: COMMERCIAL

## 2024-12-12 NOTE — TELEPHONE ENCOUNTER
I spoke with patient, our office has not received a call from SIXTO Rojas in reference to clearance.  Patient states he will show up tomorrow morning for surgery scheduled with Dr. Rodriguez.

## 2024-12-12 NOTE — TELEPHONE ENCOUNTER
Spoke with patient in regards to still haven't received surgery clearance. Dr. Rodriguez can do local anesthesia or if he did not want local Dr. Rodriguez would have to ask the Anesthesiologist to clear him but not guaranteed.  I called his primary doctor and they stated he was not seen. Patient states he did see the Noemi Rojas NP on Tuesday. I informed patient I will call that office again. Verbalized understanding and no further issues discussed.

## 2024-12-12 NOTE — TELEPHONE ENCOUNTER
Spoke with patient and he stated he will have his doctor's office fax over surgery clearance. Also, I will have Dora reach out to him in regards to what time to be at the hospital tomorrow. Vervalized understanding and no further issues discussed.----- Message from Soha sent at 12/12/2024 12:46 PM CST -----  Regarding: Arrival Time  Contact: Garrick  Arrival Maggie     Name Of Caller:Garrick White          Contact Preference:,   [phone] (Mobile)      requesting a call back.        Nature of call:pt is calling to get arrival time for surgery on 12/13/2024, and make sure he is prepared.